# Patient Record
Sex: FEMALE | Race: WHITE | Employment: FULL TIME | URBAN - METROPOLITAN AREA
[De-identification: names, ages, dates, MRNs, and addresses within clinical notes are randomized per-mention and may not be internally consistent; named-entity substitution may affect disease eponyms.]

---

## 2017-01-10 RX ORDER — BENZONATATE 200 MG/1
CAPSULE ORAL
Qty: 30 CAPSULE | Refills: 0 | Status: SHIPPED | OUTPATIENT
Start: 2017-01-10 | End: 2017-01-22 | Stop reason: SDUPTHER

## 2017-01-23 RX ORDER — BENZONATATE 200 MG/1
CAPSULE ORAL
Qty: 30 CAPSULE | Refills: 0 | Status: SHIPPED | OUTPATIENT
Start: 2017-01-23 | End: 2017-05-15 | Stop reason: SDUPTHER

## 2017-01-27 ENCOUNTER — OFFICE VISIT (OUTPATIENT)
Dept: PRIMARY CARE CLINIC | Age: 52
End: 2017-01-27

## 2017-01-27 VITALS
SYSTOLIC BLOOD PRESSURE: 116 MMHG | DIASTOLIC BLOOD PRESSURE: 80 MMHG | RESPIRATION RATE: 12 BRPM | HEART RATE: 87 BPM | WEIGHT: 190 LBS | BODY MASS INDEX: 32.44 KG/M2 | TEMPERATURE: 96.2 F | OXYGEN SATURATION: 95 % | HEIGHT: 64 IN

## 2017-01-27 DIAGNOSIS — R53.83 OTHER MALAISE AND FATIGUE: ICD-10-CM

## 2017-01-27 DIAGNOSIS — M54.41 LOW BACK PAIN WITH RADIATION, RIGHT: ICD-10-CM

## 2017-01-27 DIAGNOSIS — R53.81 OTHER MALAISE AND FATIGUE: ICD-10-CM

## 2017-01-27 DIAGNOSIS — E78.2 MIXED HYPERLIPIDEMIA: ICD-10-CM

## 2017-01-27 DIAGNOSIS — Z23 NEEDS FLU SHOT: ICD-10-CM

## 2017-01-27 DIAGNOSIS — E78.5 DM TYPE 2 WITH DIABETIC DYSLIPIDEMIA (HCC): ICD-10-CM

## 2017-01-27 DIAGNOSIS — E11.69 DM TYPE 2 WITH DIABETIC DYSLIPIDEMIA (HCC): ICD-10-CM

## 2017-01-27 DIAGNOSIS — E78.2 MIXED HYPERLIPIDEMIA: Primary | ICD-10-CM

## 2017-01-27 LAB
ALBUMIN SERPL-MCNC: 4.8 G/DL (ref 3.9–4.9)
ALP BLD-CCNC: 85 U/L (ref 40–130)
ALT SERPL-CCNC: 21 U/L (ref 0–33)
ANION GAP SERPL CALCULATED.3IONS-SCNC: 17 MEQ/L (ref 7–13)
ANISOCYTOSIS: ABNORMAL
AST SERPL-CCNC: 16 U/L (ref 0–35)
ATYPICAL LYMPHOCYTE RELATIVE PERCENT: 7 %
BANDED NEUTROPHILS RELATIVE PERCENT: 4 % (ref 5–11)
BASOPHILS ABSOLUTE: 0.1 K/UL (ref 0–0.2)
BASOPHILS RELATIVE PERCENT: 1 %
BILIRUB SERPL-MCNC: 0.3 MG/DL (ref 0–1.2)
BUN BLDV-MCNC: 22 MG/DL (ref 6–20)
CALCIUM SERPL-MCNC: 9.9 MG/DL (ref 8.6–10.2)
CHLORIDE BLD-SCNC: 99 MEQ/L (ref 98–107)
CHOLESTEROL, TOTAL: 278 MG/DL (ref 0–199)
CO2: 21 MEQ/L (ref 22–29)
CREAT SERPL-MCNC: 0.51 MG/DL (ref 0.5–0.9)
EOSINOPHILS ABSOLUTE: 0.5 K/UL (ref 0–0.7)
EOSINOPHILS RELATIVE PERCENT: 5 %
GFR AFRICAN AMERICAN: >60
GFR NON-AFRICAN AMERICAN: >60
GLOBULIN: 2.9 G/DL (ref 2.3–3.5)
GLUCOSE BLD-MCNC: 137 MG/DL (ref 74–109)
HBA1C MFR BLD: 7.7 % (ref 4.8–5.9)
HCT VFR BLD CALC: 50.5 % (ref 37–47)
HDLC SERPL-MCNC: 35 MG/DL (ref 40–59)
HEMOGLOBIN: 16.1 G/DL (ref 12–16)
LDL CHOLESTEROL CALCULATED: 184 MG/DL (ref 0–129)
LYMPHOCYTES ABSOLUTE: 2.6 K/UL (ref 1–4.8)
LYMPHOCYTES RELATIVE PERCENT: 21 %
MCH RBC QN AUTO: 27.8 PG (ref 27–31.3)
MCHC RBC AUTO-ENTMCNC: 31.8 % (ref 33–37)
MCV RBC AUTO: 87.6 FL (ref 82–100)
MONOCYTES ABSOLUTE: 0.5 K/UL (ref 0.2–0.8)
MONOCYTES RELATIVE PERCENT: 5 %
NEUTROPHILS ABSOLUTE: 5.6 K/UL (ref 1.4–6.5)
NEUTROPHILS RELATIVE PERCENT: 57 %
PDW BLD-RTO: 14.5 % (ref 11.5–14.5)
PLATELET # BLD: 257 K/UL (ref 130–400)
PLATELET SLIDE REVIEW: ADEQUATE
POTASSIUM SERPL-SCNC: 4.6 MEQ/L (ref 3.5–5.1)
RBC # BLD: 5.77 M/UL (ref 4.2–5.4)
SMUDGE CELLS: 6.9
SODIUM BLD-SCNC: 137 MEQ/L (ref 132–144)
TOTAL PROTEIN: 7.7 G/DL (ref 6.4–8.1)
TRIGL SERPL-MCNC: 293 MG/DL (ref 0–200)
WBC # BLD: 9.2 K/UL (ref 4.8–10.8)

## 2017-01-27 PROCEDURE — 4004F PT TOBACCO SCREEN RCVD TLK: CPT | Performed by: FAMILY MEDICINE

## 2017-01-27 PROCEDURE — G8419 CALC BMI OUT NRM PARAM NOF/U: HCPCS | Performed by: FAMILY MEDICINE

## 2017-01-27 PROCEDURE — G8427 DOCREV CUR MEDS BY ELIG CLIN: HCPCS | Performed by: FAMILY MEDICINE

## 2017-01-27 PROCEDURE — 90658 IIV3 VACCINE SPLT 0.5 ML IM: CPT | Performed by: FAMILY MEDICINE

## 2017-01-27 PROCEDURE — 3017F COLORECTAL CA SCREEN DOC REV: CPT | Performed by: FAMILY MEDICINE

## 2017-01-27 PROCEDURE — 3014F SCREEN MAMMO DOC REV: CPT | Performed by: FAMILY MEDICINE

## 2017-01-27 PROCEDURE — 90471 IMMUNIZATION ADMIN: CPT | Performed by: FAMILY MEDICINE

## 2017-01-27 PROCEDURE — G8484 FLU IMMUNIZE NO ADMIN: HCPCS | Performed by: FAMILY MEDICINE

## 2017-01-27 PROCEDURE — 3045F PR MOST RECENT HEMOGLOBIN A1C LEVEL 7.0-9.0%: CPT | Performed by: FAMILY MEDICINE

## 2017-01-27 PROCEDURE — 99214 OFFICE O/P EST MOD 30 MIN: CPT | Performed by: FAMILY MEDICINE

## 2017-01-27 RX ORDER — OXYCODONE HYDROCHLORIDE AND ACETAMINOPHEN 5; 325 MG/1; MG/1
1 TABLET ORAL DAILY PRN
Qty: 30 TABLET | Refills: 0 | Status: SHIPPED | OUTPATIENT
Start: 2017-01-27 | End: 2017-02-14 | Stop reason: SDUPTHER

## 2017-01-27 ASSESSMENT — ENCOUNTER SYMPTOMS
BACK PAIN: 1
BLURRED VISION: 0

## 2017-02-12 ASSESSMENT — ENCOUNTER SYMPTOMS
APNEA: 0
SHORTNESS OF BREATH: 0
ABDOMINAL DISTENTION: 0
ABDOMINAL PAIN: 0

## 2017-02-14 ENCOUNTER — OFFICE VISIT (OUTPATIENT)
Dept: PRIMARY CARE CLINIC | Age: 52
End: 2017-02-14

## 2017-02-14 VITALS
SYSTOLIC BLOOD PRESSURE: 126 MMHG | WEIGHT: 190.6 LBS | TEMPERATURE: 97.7 F | DIASTOLIC BLOOD PRESSURE: 80 MMHG | BODY MASS INDEX: 32.54 KG/M2 | RESPIRATION RATE: 14 BRPM | HEIGHT: 64 IN | HEART RATE: 76 BPM

## 2017-02-14 DIAGNOSIS — Z23 NEED FOR 23-POLYVALENT PNEUMOCOCCAL POLYSACCHARIDE VACCINE: ICD-10-CM

## 2017-02-14 DIAGNOSIS — D72.9 ABNORMAL WHITE BLOOD CELL (WBC): ICD-10-CM

## 2017-02-14 DIAGNOSIS — Z11.4 SCREENING FOR HIV (HUMAN IMMUNODEFICIENCY VIRUS): ICD-10-CM

## 2017-02-14 DIAGNOSIS — M54.41 LOW BACK PAIN WITH RADIATION, RIGHT: Primary | ICD-10-CM

## 2017-02-14 DIAGNOSIS — Z11.59 NEED FOR HEPATITIS C SCREENING TEST: ICD-10-CM

## 2017-02-14 DIAGNOSIS — E78.2 MIXED HYPERLIPIDEMIA: ICD-10-CM

## 2017-02-14 PROCEDURE — G8484 FLU IMMUNIZE NO ADMIN: HCPCS | Performed by: INTERNAL MEDICINE

## 2017-02-14 PROCEDURE — 3017F COLORECTAL CA SCREEN DOC REV: CPT | Performed by: INTERNAL MEDICINE

## 2017-02-14 PROCEDURE — 99213 OFFICE O/P EST LOW 20 MIN: CPT | Performed by: INTERNAL MEDICINE

## 2017-02-14 PROCEDURE — G8428 CUR MEDS NOT DOCUMENT: HCPCS | Performed by: INTERNAL MEDICINE

## 2017-02-14 PROCEDURE — G8419 CALC BMI OUT NRM PARAM NOF/U: HCPCS | Performed by: INTERNAL MEDICINE

## 2017-02-14 PROCEDURE — 3014F SCREEN MAMMO DOC REV: CPT | Performed by: INTERNAL MEDICINE

## 2017-02-14 PROCEDURE — 4004F PT TOBACCO SCREEN RCVD TLK: CPT | Performed by: INTERNAL MEDICINE

## 2017-02-14 PROCEDURE — 90732 PPSV23 VACC 2 YRS+ SUBQ/IM: CPT | Performed by: INTERNAL MEDICINE

## 2017-02-14 PROCEDURE — 90471 IMMUNIZATION ADMIN: CPT | Performed by: INTERNAL MEDICINE

## 2017-02-14 RX ORDER — OXYCODONE HYDROCHLORIDE AND ACETAMINOPHEN 5; 325 MG/1; MG/1
1 TABLET ORAL 2 TIMES DAILY PRN
Qty: 60 TABLET | Refills: 0 | Status: SHIPPED | OUTPATIENT
Start: 2017-02-14 | End: 2017-05-13 | Stop reason: ALTCHOICE

## 2017-02-14 RX ORDER — ATORVASTATIN CALCIUM 10 MG/1
10 TABLET, FILM COATED ORAL DAILY
Qty: 30 TABLET | Refills: 5 | Status: SHIPPED | OUTPATIENT
Start: 2017-02-14 | End: 2017-06-26 | Stop reason: SDUPTHER

## 2017-02-14 ASSESSMENT — ENCOUNTER SYMPTOMS
CHOKING: 0
ABDOMINAL DISTENTION: 0
BACK PAIN: 1
APNEA: 0
BLOOD IN STOOL: 0
FACIAL SWELLING: 0
ABDOMINAL PAIN: 0
PHOTOPHOBIA: 0

## 2017-02-21 RX ORDER — SITAGLIPTIN 100 MG/1
TABLET, FILM COATED ORAL
Qty: 30 TABLET | Refills: 0 | Status: SHIPPED | OUTPATIENT
Start: 2017-02-21 | End: 2017-03-23 | Stop reason: SDUPTHER

## 2017-02-27 RX ORDER — GLIMEPIRIDE 2 MG/1
TABLET ORAL
Qty: 60 TABLET | Refills: 3 | Status: SHIPPED | OUTPATIENT
Start: 2017-02-27 | End: 2017-05-13 | Stop reason: SDUPTHER

## 2017-03-07 PROBLEM — M51.26 HNP (HERNIATED NUCLEUS PULPOSUS), LUMBAR: Status: ACTIVE | Noted: 2017-03-07

## 2017-03-21 ENCOUNTER — HOSPITAL ENCOUNTER (OUTPATIENT)
Dept: PREADMISSION TESTING | Age: 52
Discharge: HOME | End: 2017-03-21
Payer: COMMERCIAL

## 2017-03-21 VITALS
WEIGHT: 190.48 LBS | HEIGHT: 64 IN | TEMPERATURE: 97.7 F | BODY MASS INDEX: 32.52 KG/M2 | HEART RATE: 79 BPM | DIASTOLIC BLOOD PRESSURE: 83 MMHG | SYSTOLIC BLOOD PRESSURE: 153 MMHG | RESPIRATION RATE: 16 BRPM | OXYGEN SATURATION: 97 %

## 2017-03-21 DIAGNOSIS — E66.09 NON MORBID OBESITY DUE TO EXCESS CALORIES: ICD-10-CM

## 2017-03-21 DIAGNOSIS — M51.26 HNP (HERNIATED NUCLEUS PULPOSUS), LUMBAR: ICD-10-CM

## 2017-03-21 PROBLEM — E11.9 DIABETES (HCC): Chronic | Status: ACTIVE | Noted: 2017-03-21

## 2017-03-21 LAB
ABO/RH: NORMAL
ANION GAP SERPL CALCULATED.3IONS-SCNC: 14 MEQ/L (ref 7–13)
ANTIBODY SCREEN: NORMAL
BILIRUBIN URINE: NEGATIVE
BLOOD, URINE: NEGATIVE
BUN BLDV-MCNC: 19 MG/DL (ref 6–20)
CALCIUM SERPL-MCNC: 9.3 MG/DL (ref 8.6–10.2)
CHLORIDE BLD-SCNC: 101 MEQ/L (ref 98–107)
CLARITY: CLEAR
CO2: 22 MEQ/L (ref 22–29)
COLOR: YELLOW
CREAT SERPL-MCNC: 0.62 MG/DL (ref 0.5–0.9)
GFR AFRICAN AMERICAN: >60
GFR NON-AFRICAN AMERICAN: >60
GLUCOSE BLD-MCNC: 132 MG/DL (ref 74–109)
GLUCOSE URINE: 250 MG/DL
HCT VFR BLD CALC: 44.4 % (ref 37–47)
HEMOGLOBIN: 15 G/DL (ref 12–16)
INR BLD: 0.9
KETONES, URINE: NEGATIVE MG/DL
LEUKOCYTE ESTERASE, URINE: NEGATIVE
MCH RBC QN AUTO: 29.8 PG (ref 27–31.3)
MCHC RBC AUTO-ENTMCNC: 33.8 % (ref 33–37)
MCV RBC AUTO: 87.9 FL (ref 82–100)
NITRITE, URINE: NEGATIVE
PDW BLD-RTO: 14.4 % (ref 11.5–14.5)
PH UA: 6 (ref 5–9)
PLATELET # BLD: 230 K/UL (ref 130–400)
POTASSIUM SERPL-SCNC: 4.4 MEQ/L (ref 3.5–5.1)
PROTEIN UA: NEGATIVE MG/DL
PROTHROMBIN TIME: 9.9 SEC (ref 8.1–13.7)
RBC # BLD: 5.05 M/UL (ref 4.2–5.4)
SODIUM BLD-SCNC: 137 MEQ/L (ref 132–144)
SPECIFIC GRAVITY UA: 1.02 (ref 1–1.03)
UROBILINOGEN, URINE: 1 E.U./DL
WBC # BLD: 9.1 K/UL (ref 4.8–10.8)

## 2017-03-21 PROCEDURE — 80048 BASIC METABOLIC PNL TOTAL CA: CPT

## 2017-03-21 PROCEDURE — 85610 PROTHROMBIN TIME: CPT

## 2017-03-21 PROCEDURE — 93005 ELECTROCARDIOGRAM TRACING: CPT

## 2017-03-21 PROCEDURE — 85027 COMPLETE CBC AUTOMATED: CPT

## 2017-03-21 PROCEDURE — 81003 URINALYSIS AUTO W/O SCOPE: CPT

## 2017-03-21 PROCEDURE — 86850 RBC ANTIBODY SCREEN: CPT

## 2017-03-21 PROCEDURE — 86901 BLOOD TYPING SEROLOGIC RH(D): CPT

## 2017-03-21 PROCEDURE — 86900 BLOOD TYPING SEROLOGIC ABO: CPT

## 2017-03-21 RX ORDER — LIDOCAINE HYDROCHLORIDE 10 MG/ML
1 INJECTION, SOLUTION EPIDURAL; INFILTRATION; INTRACAUDAL; PERINEURAL
Status: CANCELLED | OUTPATIENT
Start: 2017-03-21 | End: 2017-03-21

## 2017-03-21 RX ORDER — SODIUM CHLORIDE, SODIUM LACTATE, POTASSIUM CHLORIDE, CALCIUM CHLORIDE 600; 310; 30; 20 MG/100ML; MG/100ML; MG/100ML; MG/100ML
INJECTION, SOLUTION INTRAVENOUS CONTINUOUS
Status: CANCELLED | OUTPATIENT
Start: 2017-03-21

## 2017-03-21 RX ORDER — SODIUM CHLORIDE 0.9 % (FLUSH) 0.9 %
10 SYRINGE (ML) INJECTION EVERY 12 HOURS SCHEDULED
Status: CANCELLED | OUTPATIENT
Start: 2017-03-21

## 2017-03-21 RX ORDER — SODIUM CHLORIDE, SODIUM LACTATE, POTASSIUM CHLORIDE, CALCIUM CHLORIDE 600; 310; 30; 20 MG/100ML; MG/100ML; MG/100ML; MG/100ML
INJECTION, SOLUTION INTRAVENOUS CONTINUOUS
Status: CANCELLED | OUTPATIENT
Start: 2017-03-27

## 2017-03-21 RX ORDER — SODIUM CHLORIDE 0.9 % (FLUSH) 0.9 %
10 SYRINGE (ML) INJECTION PRN
Status: CANCELLED | OUTPATIENT
Start: 2017-03-21

## 2017-03-21 ASSESSMENT — ENCOUNTER SYMPTOMS
COUGH: 0
VOMITING: 0
WHEEZING: 0
STRIDOR: 0
NAUSEA: 0
SHORTNESS OF BREATH: 0
CONSTIPATION: 0
ABDOMINAL PAIN: 0
BACK PAIN: 1
SORE THROAT: 0
EYES NEGATIVE: 1
DIARRHEA: 0
HEARTBURN: 0

## 2017-03-23 LAB
EKG ATRIAL RATE: 74 BPM
EKG P AXIS: 32 DEGREES
EKG P-R INTERVAL: 136 MS
EKG Q-T INTERVAL: 412 MS
EKG QRS DURATION: 82 MS
EKG QTC CALCULATION (BAZETT): 457 MS
EKG R AXIS: 107 DEGREES
EKG T AXIS: 67 DEGREES
EKG VENTRICULAR RATE: 74 BPM

## 2017-03-23 RX ORDER — SITAGLIPTIN 100 MG/1
TABLET, FILM COATED ORAL
Qty: 30 TABLET | Refills: 3 | Status: SHIPPED | OUTPATIENT
Start: 2017-03-23 | End: 2017-05-13 | Stop reason: SDUPTHER

## 2017-03-24 ENCOUNTER — ANESTHESIA EVENT (OUTPATIENT)
Dept: OPERATING ROOM | Age: 52
End: 2017-03-24
Payer: COMMERCIAL

## 2017-03-27 ENCOUNTER — HOSPITAL ENCOUNTER (OUTPATIENT)
Age: 52
Discharge: HOME | End: 2017-03-28
Attending: NEUROLOGICAL SURGERY | Admitting: NEUROLOGICAL SURGERY
Payer: COMMERCIAL

## 2017-03-27 ENCOUNTER — HOSPITAL ENCOUNTER (OUTPATIENT)
Dept: GENERAL RADIOLOGY | Age: 52
Setting detail: OUTPATIENT SURGERY
Discharge: HOME | End: 2017-03-27
Attending: NEUROLOGICAL SURGERY
Payer: COMMERCIAL

## 2017-03-27 ENCOUNTER — ANESTHESIA (OUTPATIENT)
Dept: OPERATING ROOM | Age: 52
End: 2017-03-27
Payer: COMMERCIAL

## 2017-03-27 VITALS
RESPIRATION RATE: 11 BRPM | SYSTOLIC BLOOD PRESSURE: 124 MMHG | OXYGEN SATURATION: 99 % | DIASTOLIC BLOOD PRESSURE: 61 MMHG | TEMPERATURE: 96.8 F

## 2017-03-27 DIAGNOSIS — Z98.890 S/P DISKECTOMY: ICD-10-CM

## 2017-03-27 LAB
GLUCOSE BLD-MCNC: 114 MG/DL (ref 60–115)
GLUCOSE BLD-MCNC: 172 MG/DL (ref 60–115)
PERFORMED ON: ABNORMAL
PERFORMED ON: NORMAL

## 2017-03-27 PROCEDURE — 7100000001 HC PACU RECOVERY - ADDTL 15 MIN: Performed by: NEUROLOGICAL SURGERY

## 2017-03-27 PROCEDURE — 6370000000 HC RX 637 (ALT 250 FOR IP): Performed by: NEUROLOGICAL SURGERY

## 2017-03-27 PROCEDURE — 3700000001 HC ADD 15 MINUTES (ANESTHESIA): Performed by: NEUROLOGICAL SURGERY

## 2017-03-27 PROCEDURE — 6360000002 HC RX W HCPCS: Performed by: NEUROLOGICAL SURGERY

## 2017-03-27 PROCEDURE — 2720000010 HC SURG SUPPLY STERILE: Performed by: NEUROLOGICAL SURGERY

## 2017-03-27 PROCEDURE — 3209999900 FLUORO FOR SURGICAL PROCEDURES

## 2017-03-27 PROCEDURE — 6370000000 HC RX 637 (ALT 250 FOR IP): Performed by: FAMILY MEDICINE

## 2017-03-27 PROCEDURE — 7100000000 HC PACU RECOVERY - FIRST 15 MIN: Performed by: NEUROLOGICAL SURGERY

## 2017-03-27 PROCEDURE — 6360000002 HC RX W HCPCS: Performed by: NURSE ANESTHETIST, CERTIFIED REGISTERED

## 2017-03-27 PROCEDURE — 2580000003 HC RX 258: Performed by: STUDENT IN AN ORGANIZED HEALTH CARE EDUCATION/TRAINING PROGRAM

## 2017-03-27 PROCEDURE — G0378 HOSPITAL OBSERVATION PER HR: HCPCS

## 2017-03-27 PROCEDURE — 2500000003 HC RX 250 WO HCPCS: Performed by: NEUROLOGICAL SURGERY

## 2017-03-27 PROCEDURE — 2580000003 HC RX 258: Performed by: NURSE ANESTHETIST, CERTIFIED REGISTERED

## 2017-03-27 PROCEDURE — 2500000003 HC RX 250 WO HCPCS: Performed by: NURSE ANESTHETIST, CERTIFIED REGISTERED

## 2017-03-27 PROCEDURE — 3600000004 HC SURGERY LEVEL 4 BASE: Performed by: NEUROLOGICAL SURGERY

## 2017-03-27 PROCEDURE — 2500000003 HC RX 250 WO HCPCS: Performed by: STUDENT IN AN ORGANIZED HEALTH CARE EDUCATION/TRAINING PROGRAM

## 2017-03-27 PROCEDURE — 2580000003 HC RX 258: Performed by: NEUROLOGICAL SURGERY

## 2017-03-27 PROCEDURE — 3600000014 HC SURGERY LEVEL 4 ADDTL 15MIN: Performed by: NEUROLOGICAL SURGERY

## 2017-03-27 PROCEDURE — 3700000000 HC ANESTHESIA ATTENDED CARE: Performed by: NEUROLOGICAL SURGERY

## 2017-03-27 PROCEDURE — 6360000002 HC RX W HCPCS

## 2017-03-27 RX ORDER — SUCCINYLCHOLINE CHLORIDE 20 MG/ML
INJECTION INTRAMUSCULAR; INTRAVENOUS PRN
Status: DISCONTINUED | OUTPATIENT
Start: 2017-03-27 | End: 2017-03-27 | Stop reason: SDUPTHER

## 2017-03-27 RX ORDER — METOCLOPRAMIDE HYDROCHLORIDE 5 MG/ML
10 INJECTION INTRAMUSCULAR; INTRAVENOUS
Status: DISCONTINUED | OUTPATIENT
Start: 2017-03-27 | End: 2017-03-27 | Stop reason: HOSPADM

## 2017-03-27 RX ORDER — SODIUM CHLORIDE, SODIUM LACTATE, POTASSIUM CHLORIDE, CALCIUM CHLORIDE 600; 310; 30; 20 MG/100ML; MG/100ML; MG/100ML; MG/100ML
INJECTION, SOLUTION INTRAVENOUS CONTINUOUS
Status: DISCONTINUED | OUTPATIENT
Start: 2017-03-27 | End: 2017-03-27

## 2017-03-27 RX ORDER — LIDOCAINE HYDROCHLORIDE 20 MG/ML
INJECTION, SOLUTION INFILTRATION; PERINEURAL PRN
Status: DISCONTINUED | OUTPATIENT
Start: 2017-03-27 | End: 2017-03-27 | Stop reason: SDUPTHER

## 2017-03-27 RX ORDER — ACETAMINOPHEN 325 MG/1
650 TABLET ORAL EVERY 4 HOURS PRN
Status: DISCONTINUED | OUTPATIENT
Start: 2017-03-27 | End: 2017-03-28 | Stop reason: HOSPADM

## 2017-03-27 RX ORDER — SODIUM CHLORIDE 0.9 % (FLUSH) 0.9 %
10 SYRINGE (ML) INJECTION EVERY 12 HOURS SCHEDULED
Status: DISCONTINUED | OUTPATIENT
Start: 2017-03-27 | End: 2017-03-28 | Stop reason: HOSPADM

## 2017-03-27 RX ORDER — TRAZODONE HYDROCHLORIDE 100 MG/1
100 TABLET ORAL DAILY
Status: DISCONTINUED | OUTPATIENT
Start: 2017-03-27 | End: 2017-03-28 | Stop reason: HOSPADM

## 2017-03-27 RX ORDER — MAGNESIUM HYDROXIDE 1200 MG/15ML
LIQUID ORAL CONTINUOUS PRN
Status: DISCONTINUED | OUTPATIENT
Start: 2017-03-27 | End: 2017-03-27 | Stop reason: HOSPADM

## 2017-03-27 RX ORDER — IPRATROPIUM BROMIDE 42 UG/1
2 SPRAY, METERED NASAL 3 TIMES DAILY PRN
Status: DISCONTINUED | OUTPATIENT
Start: 2017-03-27 | End: 2017-03-28 | Stop reason: HOSPADM

## 2017-03-27 RX ORDER — OXYCODONE HYDROCHLORIDE AND ACETAMINOPHEN 5; 325 MG/1; MG/1
1 TABLET ORAL EVERY 4 HOURS PRN
Status: DISCONTINUED | OUTPATIENT
Start: 2017-03-27 | End: 2017-03-28 | Stop reason: HOSPADM

## 2017-03-27 RX ORDER — HYDROCODONE BITARTRATE AND ACETAMINOPHEN 5; 325 MG/1; MG/1
2 TABLET ORAL PRN
Status: DISCONTINUED | OUTPATIENT
Start: 2017-03-27 | End: 2017-03-27 | Stop reason: HOSPADM

## 2017-03-27 RX ORDER — SODIUM CHLORIDE 9 MG/ML
INJECTION, SOLUTION INTRAVENOUS CONTINUOUS
Status: DISCONTINUED | OUTPATIENT
Start: 2017-03-27 | End: 2017-03-28 | Stop reason: HOSPADM

## 2017-03-27 RX ORDER — ATORVASTATIN CALCIUM 10 MG/1
10 TABLET, FILM COATED ORAL DAILY
Status: DISCONTINUED | OUTPATIENT
Start: 2017-03-27 | End: 2017-03-28 | Stop reason: HOSPADM

## 2017-03-27 RX ORDER — FENTANYL CITRATE 50 UG/ML
50 INJECTION, SOLUTION INTRAMUSCULAR; INTRAVENOUS EVERY 10 MIN PRN
Status: DISCONTINUED | OUTPATIENT
Start: 2017-03-27 | End: 2017-03-27 | Stop reason: HOSPADM

## 2017-03-27 RX ORDER — DIPHENHYDRAMINE HYDROCHLORIDE 50 MG/ML
12.5 INJECTION INTRAMUSCULAR; INTRAVENOUS
Status: DISCONTINUED | OUTPATIENT
Start: 2017-03-27 | End: 2017-03-27 | Stop reason: HOSPADM

## 2017-03-27 RX ORDER — CYCLOBENZAPRINE HCL 10 MG
10 TABLET ORAL 2 TIMES DAILY PRN
Status: DISCONTINUED | OUTPATIENT
Start: 2017-03-27 | End: 2017-03-28 | Stop reason: HOSPADM

## 2017-03-27 RX ORDER — MEPERIDINE HYDROCHLORIDE 25 MG/ML
12.5 INJECTION INTRAMUSCULAR; INTRAVENOUS; SUBCUTANEOUS EVERY 5 MIN PRN
Status: DISCONTINUED | OUTPATIENT
Start: 2017-03-27 | End: 2017-03-27 | Stop reason: HOSPADM

## 2017-03-27 RX ORDER — HYDROCODONE BITARTRATE AND ACETAMINOPHEN 5; 325 MG/1; MG/1
1 TABLET ORAL PRN
Status: DISCONTINUED | OUTPATIENT
Start: 2017-03-27 | End: 2017-03-27 | Stop reason: HOSPADM

## 2017-03-27 RX ORDER — ONDANSETRON 2 MG/ML
4 INJECTION INTRAMUSCULAR; INTRAVENOUS EVERY 6 HOURS PRN
Status: DISCONTINUED | OUTPATIENT
Start: 2017-03-27 | End: 2017-03-28 | Stop reason: HOSPADM

## 2017-03-27 RX ORDER — SODIUM CHLORIDE 0.9 % (FLUSH) 0.9 %
10 SYRINGE (ML) INJECTION PRN
Status: DISCONTINUED | OUTPATIENT
Start: 2017-03-27 | End: 2017-03-28 | Stop reason: HOSPADM

## 2017-03-27 RX ORDER — PROPOFOL 10 MG/ML
INJECTION, EMULSION INTRAVENOUS PRN
Status: DISCONTINUED | OUTPATIENT
Start: 2017-03-27 | End: 2017-03-27 | Stop reason: SDUPTHER

## 2017-03-27 RX ORDER — OXYCODONE HYDROCHLORIDE AND ACETAMINOPHEN 5; 325 MG/1; MG/1
2 TABLET ORAL EVERY 4 HOURS PRN
Status: DISCONTINUED | OUTPATIENT
Start: 2017-03-27 | End: 2017-03-28 | Stop reason: HOSPADM

## 2017-03-27 RX ORDER — ONDANSETRON 2 MG/ML
4 INJECTION INTRAMUSCULAR; INTRAVENOUS
Status: DISCONTINUED | OUTPATIENT
Start: 2017-03-27 | End: 2017-03-27 | Stop reason: HOSPADM

## 2017-03-27 RX ORDER — MIDAZOLAM HYDROCHLORIDE 1 MG/ML
INJECTION INTRAMUSCULAR; INTRAVENOUS PRN
Status: DISCONTINUED | OUTPATIENT
Start: 2017-03-27 | End: 2017-03-27 | Stop reason: SDUPTHER

## 2017-03-27 RX ORDER — FENTANYL CITRATE 50 UG/ML
INJECTION, SOLUTION INTRAMUSCULAR; INTRAVENOUS PRN
Status: DISCONTINUED | OUTPATIENT
Start: 2017-03-27 | End: 2017-03-27 | Stop reason: SDUPTHER

## 2017-03-27 RX ORDER — LIDOCAINE HYDROCHLORIDE 10 MG/ML
1 INJECTION, SOLUTION EPIDURAL; INFILTRATION; INTRACAUDAL; PERINEURAL
Status: COMPLETED | OUTPATIENT
Start: 2017-03-27 | End: 2017-03-27

## 2017-03-27 RX ORDER — ONDANSETRON 2 MG/ML
INJECTION INTRAMUSCULAR; INTRAVENOUS PRN
Status: DISCONTINUED | OUTPATIENT
Start: 2017-03-27 | End: 2017-03-27 | Stop reason: SDUPTHER

## 2017-03-27 RX ORDER — GLIMEPIRIDE 2 MG/1
2 TABLET ORAL 2 TIMES DAILY
Status: DISCONTINUED | OUTPATIENT
Start: 2017-03-27 | End: 2017-03-27

## 2017-03-27 RX ORDER — VANCOMYCIN HYDROCHLORIDE 1 G/200ML
INJECTION, SOLUTION INTRAVENOUS CONTINUOUS PRN
Status: DISCONTINUED | OUTPATIENT
Start: 2017-03-27 | End: 2017-03-27 | Stop reason: HOSPADM

## 2017-03-27 RX ORDER — BENZONATATE 100 MG/1
100 CAPSULE ORAL EVERY 4 HOURS PRN
Status: DISCONTINUED | OUTPATIENT
Start: 2017-03-27 | End: 2017-03-28 | Stop reason: HOSPADM

## 2017-03-27 RX ORDER — ROCURONIUM BROMIDE 10 MG/ML
INJECTION, SOLUTION INTRAVENOUS PRN
Status: DISCONTINUED | OUTPATIENT
Start: 2017-03-27 | End: 2017-03-27 | Stop reason: SDUPTHER

## 2017-03-27 RX ORDER — SODIUM CHLORIDE, SODIUM LACTATE, POTASSIUM CHLORIDE, CALCIUM CHLORIDE 600; 310; 30; 20 MG/100ML; MG/100ML; MG/100ML; MG/100ML
INJECTION, SOLUTION INTRAVENOUS CONTINUOUS
Status: DISCONTINUED | OUTPATIENT
Start: 2017-03-27 | End: 2017-03-28 | Stop reason: HOSPADM

## 2017-03-27 RX ORDER — DOCUSATE SODIUM 100 MG/1
100 CAPSULE, LIQUID FILLED ORAL 2 TIMES DAILY
Status: DISCONTINUED | OUTPATIENT
Start: 2017-03-27 | End: 2017-03-28 | Stop reason: HOSPADM

## 2017-03-27 RX ORDER — GLIMEPIRIDE 2 MG/1
2 TABLET ORAL 2 TIMES DAILY PRN
Status: DISCONTINUED | OUTPATIENT
Start: 2017-03-27 | End: 2017-03-28 | Stop reason: HOSPADM

## 2017-03-27 RX ADMIN — PHENYLEPHRINE HYDROCHLORIDE 100 MCG: 10 INJECTION INTRAVENOUS at 16:10

## 2017-03-27 RX ADMIN — PHENYLEPHRINE HYDROCHLORIDE 100 MCG: 10 INJECTION INTRAVENOUS at 16:13

## 2017-03-27 RX ADMIN — Medication 2 G: at 15:47

## 2017-03-27 RX ADMIN — SUCCINYLCHOLINE CHLORIDE 120 MG: 20 INJECTION, SOLUTION INTRAMUSCULAR; INTRAVENOUS at 15:54

## 2017-03-27 RX ADMIN — PHENYLEPHRINE HYDROCHLORIDE 100 MCG: 10 INJECTION INTRAVENOUS at 16:11

## 2017-03-27 RX ADMIN — DOCUSATE SODIUM 100 MG: 100 CAPSULE, LIQUID FILLED ORAL at 21:40

## 2017-03-27 RX ADMIN — SUGAMMADEX 200 MG: 100 INJECTION, SOLUTION INTRAVENOUS at 17:18

## 2017-03-27 RX ADMIN — LIDOCAINE HYDROCHLORIDE 80 MG: 20 INJECTION, SOLUTION INFILTRATION; PERINEURAL at 15:53

## 2017-03-27 RX ADMIN — SODIUM CHLORIDE, POTASSIUM CHLORIDE, SODIUM LACTATE AND CALCIUM CHLORIDE: 600; 310; 30; 20 INJECTION, SOLUTION INTRAVENOUS at 12:54

## 2017-03-27 RX ADMIN — OXYCODONE HYDROCHLORIDE AND ACETAMINOPHEN 1 TABLET: 5; 325 TABLET ORAL at 21:40

## 2017-03-27 RX ADMIN — FENTANYL CITRATE 50 MCG: 50 INJECTION, SOLUTION INTRAMUSCULAR; INTRAVENOUS at 17:18

## 2017-03-27 RX ADMIN — ROCURONIUM BROMIDE 40 MG: 10 SOLUTION INTRAVENOUS at 16:01

## 2017-03-27 RX ADMIN — OXYCODONE HYDROCHLORIDE AND ACETAMINOPHEN 1 TABLET: 5; 325 TABLET ORAL at 19:43

## 2017-03-27 RX ADMIN — TRAZODONE HYDROCHLORIDE 100 MG: 100 TABLET ORAL at 21:43

## 2017-03-27 RX ADMIN — LINAGLIPTIN 5 MG: 5 TABLET, FILM COATED ORAL at 21:40

## 2017-03-27 RX ADMIN — SODIUM CHLORIDE, POTASSIUM CHLORIDE, SODIUM LACTATE AND CALCIUM CHLORIDE: 600; 310; 30; 20 INJECTION, SOLUTION INTRAVENOUS at 16:35

## 2017-03-27 RX ADMIN — ROCURONIUM BROMIDE 10 MG: 10 SOLUTION INTRAVENOUS at 15:53

## 2017-03-27 RX ADMIN — PROPOFOL 150 MG: 10 INJECTION, EMULSION INTRAVENOUS at 15:53

## 2017-03-27 RX ADMIN — ONDANSETRON 4 MG: 2 INJECTION, SOLUTION INTRAMUSCULAR; INTRAVENOUS at 17:15

## 2017-03-27 RX ADMIN — LIDOCAINE HYDROCHLORIDE 0.1 ML: 10 INJECTION, SOLUTION EPIDURAL; INFILTRATION; INTRACAUDAL; PERINEURAL at 12:54

## 2017-03-27 RX ADMIN — PHENYLEPHRINE HYDROCHLORIDE 30 MCG/MIN: 10 INJECTION INTRAVENOUS at 16:13

## 2017-03-27 RX ADMIN — PHENYLEPHRINE HYDROCHLORIDE 100 MCG: 10 INJECTION INTRAVENOUS at 16:06

## 2017-03-27 RX ADMIN — SODIUM CHLORIDE: 9 INJECTION, SOLUTION INTRAVENOUS at 19:43

## 2017-03-27 RX ADMIN — ATORVASTATIN CALCIUM 10 MG: 10 TABLET, FILM COATED ORAL at 21:40

## 2017-03-27 RX ADMIN — FENTANYL CITRATE 50 MCG: 50 INJECTION, SOLUTION INTRAMUSCULAR; INTRAVENOUS at 17:22

## 2017-03-27 RX ADMIN — PHENYLEPHRINE HYDROCHLORIDE 100 MCG: 10 INJECTION INTRAVENOUS at 16:05

## 2017-03-27 RX ADMIN — FENTANYL CITRATE 100 MCG: 50 INJECTION, SOLUTION INTRAMUSCULAR; INTRAVENOUS at 15:52

## 2017-03-27 RX ADMIN — MIDAZOLAM HYDROCHLORIDE 2 MG: 1 INJECTION, SOLUTION INTRAMUSCULAR; INTRAVENOUS at 15:47

## 2017-03-27 ASSESSMENT — PAIN DESCRIPTION - DESCRIPTORS
DESCRIPTORS: ACHING
DESCRIPTORS: ACHING

## 2017-03-27 ASSESSMENT — PAIN SCALES - GENERAL
PAINLEVEL_OUTOF10: 8
PAINLEVEL_OUTOF10: 8
PAINLEVEL_OUTOF10: 0
PAINLEVEL_OUTOF10: 6
PAINLEVEL_OUTOF10: 0
PAINLEVEL_OUTOF10: 0

## 2017-03-27 ASSESSMENT — PAIN DESCRIPTION - PAIN TYPE: TYPE: SURGICAL PAIN

## 2017-03-27 ASSESSMENT — PAIN - FUNCTIONAL ASSESSMENT: PAIN_FUNCTIONAL_ASSESSMENT: 0-10

## 2017-03-27 ASSESSMENT — PAIN DESCRIPTION - FREQUENCY: FREQUENCY: INTERMITTENT

## 2017-03-27 ASSESSMENT — PAIN DESCRIPTION - LOCATION: LOCATION: BACK

## 2017-03-28 VITALS
SYSTOLIC BLOOD PRESSURE: 113 MMHG | OXYGEN SATURATION: 99 % | BODY MASS INDEX: 33.66 KG/M2 | HEART RATE: 75 BPM | HEIGHT: 63 IN | RESPIRATION RATE: 16 BRPM | DIASTOLIC BLOOD PRESSURE: 68 MMHG | WEIGHT: 190 LBS | TEMPERATURE: 98.5 F

## 2017-03-28 LAB
GLUCOSE BLD-MCNC: 135 MG/DL (ref 60–115)
PERFORMED ON: ABNORMAL

## 2017-03-28 PROCEDURE — G0378 HOSPITAL OBSERVATION PER HR: HCPCS

## 2017-03-28 PROCEDURE — 96365 THER/PROPH/DIAG IV INF INIT: CPT

## 2017-03-28 PROCEDURE — 6370000000 HC RX 637 (ALT 250 FOR IP): Performed by: NEUROLOGICAL SURGERY

## 2017-03-28 PROCEDURE — 2580000003 HC RX 258: Performed by: NEUROLOGICAL SURGERY

## 2017-03-28 PROCEDURE — 6360000002 HC RX W HCPCS: Performed by: NEUROLOGICAL SURGERY

## 2017-03-28 PROCEDURE — 96366 THER/PROPH/DIAG IV INF ADDON: CPT

## 2017-03-28 RX ADMIN — OXYCODONE HYDROCHLORIDE AND ACETAMINOPHEN 2 TABLET: 5; 325 TABLET ORAL at 02:39

## 2017-03-28 RX ADMIN — DOCUSATE SODIUM 100 MG: 100 CAPSULE, LIQUID FILLED ORAL at 09:06

## 2017-03-28 RX ADMIN — SODIUM CHLORIDE: 9 INJECTION, SOLUTION INTRAVENOUS at 05:53

## 2017-03-28 RX ADMIN — Medication 2 G: at 09:06

## 2017-03-28 RX ADMIN — OXYCODONE HYDROCHLORIDE AND ACETAMINOPHEN 2 TABLET: 5; 325 TABLET ORAL at 09:06

## 2017-03-28 RX ADMIN — Medication 2 G: at 00:17

## 2017-03-28 ASSESSMENT — PAIN SCALES - GENERAL
PAINLEVEL_OUTOF10: 0
PAINLEVEL_OUTOF10: 0
PAINLEVEL_OUTOF10: 6
PAINLEVEL_OUTOF10: 5
PAINLEVEL_OUTOF10: 3
PAINLEVEL_OUTOF10: 3
PAINLEVEL_OUTOF10: 6

## 2017-03-28 ASSESSMENT — PAIN DESCRIPTION - ONSET
ONSET: ON-GOING

## 2017-03-28 ASSESSMENT — PAIN DESCRIPTION - LOCATION
LOCATION: BACK

## 2017-03-28 ASSESSMENT — PAIN DESCRIPTION - PAIN TYPE
TYPE: SURGICAL PAIN

## 2017-03-28 ASSESSMENT — PAIN DESCRIPTION - FREQUENCY
FREQUENCY: CONTINUOUS

## 2017-03-28 ASSESSMENT — PAIN DESCRIPTION - ORIENTATION
ORIENTATION: LOWER
ORIENTATION: LOWER
ORIENTATION: POSTERIOR
ORIENTATION: LOWER

## 2017-03-28 ASSESSMENT — PAIN DESCRIPTION - DESCRIPTORS
DESCRIPTORS: THROBBING

## 2017-04-13 RX ORDER — TRAZODONE HYDROCHLORIDE 150 MG/1
TABLET ORAL
Qty: 30 TABLET | Refills: 0 | Status: SHIPPED | OUTPATIENT
Start: 2017-04-13 | End: 2017-05-15 | Stop reason: SDUPTHER

## 2017-05-13 ENCOUNTER — OFFICE VISIT (OUTPATIENT)
Dept: PRIMARY CARE CLINIC | Age: 52
End: 2017-05-13

## 2017-05-13 VITALS
HEIGHT: 64 IN | WEIGHT: 189 LBS | SYSTOLIC BLOOD PRESSURE: 92 MMHG | DIASTOLIC BLOOD PRESSURE: 62 MMHG | BODY MASS INDEX: 32.27 KG/M2 | HEART RATE: 80 BPM

## 2017-05-13 DIAGNOSIS — E78.00 HYPERCHOLESTEREMIA: ICD-10-CM

## 2017-05-13 DIAGNOSIS — J40 BRONCHITIS: ICD-10-CM

## 2017-05-13 LAB
ALBUMIN SERPL-MCNC: 4.6 G/DL (ref 3.9–4.9)
ALP BLD-CCNC: 112 U/L (ref 40–130)
ALT SERPL-CCNC: 38 U/L (ref 0–33)
ANION GAP SERPL CALCULATED.3IONS-SCNC: 15 MEQ/L (ref 7–13)
AST SERPL-CCNC: 22 U/L (ref 0–35)
BILIRUB SERPL-MCNC: 0.2 MG/DL (ref 0–1.2)
BILIRUBIN DIRECT: 0 MG/DL (ref 0–0.3)
BILIRUBIN, INDIRECT: 0.2 MG/DL (ref 0–0.6)
BUN BLDV-MCNC: 21 MG/DL (ref 6–20)
CALCIUM SERPL-MCNC: 9.8 MG/DL (ref 8.6–10.2)
CHLORIDE BLD-SCNC: 98 MEQ/L (ref 98–107)
CHOLESTEROL, TOTAL: 232 MG/DL (ref 0–199)
CO2: 24 MEQ/L (ref 22–29)
CREAT SERPL-MCNC: 0.57 MG/DL (ref 0.5–0.9)
CREATININE URINE: 47.9 MG/DL
GFR AFRICAN AMERICAN: >60
GFR NON-AFRICAN AMERICAN: >60
GLUCOSE BLD-MCNC: 171 MG/DL (ref 74–109)
GLUCOSE BLD-MCNC: 176 MG/DL
HBA1C MFR BLD: 7.6 %
HDLC SERPL-MCNC: 34 MG/DL (ref 40–59)
LDL CHOLESTEROL CALCULATED: 147 MG/DL (ref 0–129)
MICROALBUMIN UR-MCNC: <1.2 MG/DL
MICROALBUMIN/CREAT UR-RTO: NORMAL MG/G (ref 0–30)
POTASSIUM SERPL-SCNC: 4.3 MEQ/L (ref 3.5–5.1)
SODIUM BLD-SCNC: 137 MEQ/L (ref 132–144)
TOTAL PROTEIN: 7.6 G/DL (ref 6.4–8.1)
TRIGL SERPL-MCNC: 255 MG/DL (ref 0–200)

## 2017-05-13 PROCEDURE — G8427 DOCREV CUR MEDS BY ELIG CLIN: HCPCS | Performed by: INTERNAL MEDICINE

## 2017-05-13 PROCEDURE — 4004F PT TOBACCO SCREEN RCVD TLK: CPT | Performed by: INTERNAL MEDICINE

## 2017-05-13 PROCEDURE — G8417 CALC BMI ABV UP PARAM F/U: HCPCS | Performed by: INTERNAL MEDICINE

## 2017-05-13 PROCEDURE — 3014F SCREEN MAMMO DOC REV: CPT | Performed by: INTERNAL MEDICINE

## 2017-05-13 PROCEDURE — 99214 OFFICE O/P EST MOD 30 MIN: CPT | Performed by: INTERNAL MEDICINE

## 2017-05-13 PROCEDURE — 3045F PR MOST RECENT HEMOGLOBIN A1C LEVEL 7.0-9.0%: CPT | Performed by: INTERNAL MEDICINE

## 2017-05-13 PROCEDURE — 3017F COLORECTAL CA SCREEN DOC REV: CPT | Performed by: INTERNAL MEDICINE

## 2017-05-13 PROCEDURE — 82962 GLUCOSE BLOOD TEST: CPT | Performed by: INTERNAL MEDICINE

## 2017-05-13 PROCEDURE — 83036 HEMOGLOBIN GLYCOSYLATED A1C: CPT | Performed by: INTERNAL MEDICINE

## 2017-05-13 RX ORDER — GLIMEPIRIDE 2 MG/1
TABLET ORAL
Qty: 60 TABLET | Refills: 3 | Status: SHIPPED | OUTPATIENT
Start: 2017-05-13 | End: 2017-10-24 | Stop reason: SDUPTHER

## 2017-05-13 RX ORDER — AZITHROMYCIN 250 MG/1
TABLET, FILM COATED ORAL
Qty: 6 TABLET | Refills: 0 | Status: SHIPPED | OUTPATIENT
Start: 2017-05-13 | End: 2017-06-26

## 2017-05-13 ASSESSMENT — ENCOUNTER SYMPTOMS
COUGH: 1
WHEEZING: 1
BACK PAIN: 1

## 2017-05-15 RX ORDER — TRAZODONE HYDROCHLORIDE 150 MG/1
150 TABLET ORAL NIGHTLY
Qty: 30 TABLET | Refills: 5 | Status: SHIPPED | OUTPATIENT
Start: 2017-05-15 | End: 2017-06-28 | Stop reason: SDUPTHER

## 2017-05-15 RX ORDER — BENZONATATE 200 MG/1
200 CAPSULE ORAL 3 TIMES DAILY PRN
Qty: 30 CAPSULE | Refills: 0 | Status: SHIPPED | OUTPATIENT
Start: 2017-05-15 | End: 2017-10-28 | Stop reason: ALTCHOICE

## 2017-06-13 DIAGNOSIS — M54.41 LOW BACK PAIN WITH RADIATION, RIGHT: ICD-10-CM

## 2017-06-13 RX ORDER — CYCLOBENZAPRINE HCL 10 MG
10 TABLET ORAL 2 TIMES DAILY PRN
Qty: 60 TABLET | Refills: 4 | Status: SHIPPED | OUTPATIENT
Start: 2017-06-13 | End: 2019-04-16

## 2017-06-26 ENCOUNTER — OFFICE VISIT (OUTPATIENT)
Dept: PRIMARY CARE CLINIC | Age: 52
End: 2017-06-26

## 2017-06-26 VITALS
BODY MASS INDEX: 32.68 KG/M2 | SYSTOLIC BLOOD PRESSURE: 120 MMHG | OXYGEN SATURATION: 96 % | WEIGHT: 191.4 LBS | HEART RATE: 101 BPM | RESPIRATION RATE: 16 BRPM | DIASTOLIC BLOOD PRESSURE: 70 MMHG | HEIGHT: 64 IN | TEMPERATURE: 98.6 F

## 2017-06-26 DIAGNOSIS — M54.9 CHRONIC LEFT-SIDED BACK PAIN, UNSPECIFIED BACK LOCATION: ICD-10-CM

## 2017-06-26 DIAGNOSIS — G47.09 OTHER INSOMNIA: ICD-10-CM

## 2017-06-26 DIAGNOSIS — M25.562 CHRONIC PAIN OF BOTH KNEES: ICD-10-CM

## 2017-06-26 DIAGNOSIS — G89.29 CHRONIC PAIN OF BOTH KNEES: ICD-10-CM

## 2017-06-26 DIAGNOSIS — Z11.4 SCREENING FOR HIV (HUMAN IMMUNODEFICIENCY VIRUS): ICD-10-CM

## 2017-06-26 DIAGNOSIS — Z11.59 NEED FOR HEPATITIS C SCREENING TEST: ICD-10-CM

## 2017-06-26 DIAGNOSIS — E78.2 MIXED HYPERLIPIDEMIA: Primary | ICD-10-CM

## 2017-06-26 DIAGNOSIS — G89.29 CHRONIC LEFT-SIDED BACK PAIN, UNSPECIFIED BACK LOCATION: ICD-10-CM

## 2017-06-26 DIAGNOSIS — M25.561 CHRONIC PAIN OF BOTH KNEES: ICD-10-CM

## 2017-06-26 PROCEDURE — 3014F SCREEN MAMMO DOC REV: CPT | Performed by: INTERNAL MEDICINE

## 2017-06-26 PROCEDURE — G8427 DOCREV CUR MEDS BY ELIG CLIN: HCPCS | Performed by: INTERNAL MEDICINE

## 2017-06-26 PROCEDURE — G8417 CALC BMI ABV UP PARAM F/U: HCPCS | Performed by: INTERNAL MEDICINE

## 2017-06-26 PROCEDURE — 4004F PT TOBACCO SCREEN RCVD TLK: CPT | Performed by: INTERNAL MEDICINE

## 2017-06-26 PROCEDURE — 3017F COLORECTAL CA SCREEN DOC REV: CPT | Performed by: INTERNAL MEDICINE

## 2017-06-26 PROCEDURE — 99214 OFFICE O/P EST MOD 30 MIN: CPT | Performed by: INTERNAL MEDICINE

## 2017-06-26 RX ORDER — TRAZODONE HYDROCHLORIDE 100 MG/1
TABLET ORAL
Qty: 30 TABLET | Refills: 5 | Status: SHIPPED | OUTPATIENT
Start: 2017-06-26 | End: 2017-06-28

## 2017-06-26 RX ORDER — ATORVASTATIN CALCIUM 20 MG/1
20 TABLET, FILM COATED ORAL DAILY
Qty: 30 TABLET | Refills: 5 | Status: SHIPPED | OUTPATIENT
Start: 2017-06-26 | End: 2017-10-28 | Stop reason: SDUPTHER

## 2017-06-26 RX ORDER — TRAMADOL HYDROCHLORIDE 50 MG/1
50 TABLET ORAL 2 TIMES DAILY PRN
Qty: 60 TABLET | Refills: 2 | Status: SHIPPED | OUTPATIENT
Start: 2017-06-26 | End: 2019-04-16

## 2017-06-26 RX ORDER — IBUPROFEN 800 MG/1
TABLET ORAL
Refills: 5 | COMMUNITY
Start: 2017-06-22 | End: 2020-04-06 | Stop reason: SDUPTHER

## 2017-06-26 ASSESSMENT — PATIENT HEALTH QUESTIONNAIRE - PHQ9
SUM OF ALL RESPONSES TO PHQ QUESTIONS 1-9: 0
2. FEELING DOWN, DEPRESSED OR HOPELESS: 0
SUM OF ALL RESPONSES TO PHQ9 QUESTIONS 1 & 2: 0
1. LITTLE INTEREST OR PLEASURE IN DOING THINGS: 0

## 2017-06-28 RX ORDER — TRAZODONE HYDROCHLORIDE 150 MG/1
150 TABLET ORAL NIGHTLY
Qty: 30 TABLET | Refills: 5 | Status: SHIPPED | OUTPATIENT
Start: 2017-06-28 | End: 2020-02-22 | Stop reason: SDUPTHER

## 2017-06-29 ASSESSMENT — ENCOUNTER SYMPTOMS
APNEA: 0
PHOTOPHOBIA: 0
BACK PAIN: 1
CHOKING: 0
FACIAL SWELLING: 0
ABDOMINAL PAIN: 0
BLOOD IN STOOL: 0
ABDOMINAL DISTENTION: 0

## 2017-07-21 ENCOUNTER — TELEPHONE (OUTPATIENT)
Dept: PRIMARY CARE CLINIC | Age: 52
End: 2017-07-21

## 2017-07-21 DIAGNOSIS — M79.671 RIGHT FOOT PAIN: Primary | ICD-10-CM

## 2017-07-21 NOTE — TELEPHONE ENCOUNTER
----- Message from Vamshi Garcia sent at 7/21/2017  2:39 PM EDT -----  Regarding: Visit Follow-Up Question  Contact: [phone]  Hello there,  I am in need of a referral to [facility]. I have scheduled an appointment but didn't realize I didn't have a referral.   Would you be able to send me one?  My appointment is on 7/28/17 at 8:50am.    Thanks calista Santo

## 2017-07-28 ENCOUNTER — OFFICE VISIT (OUTPATIENT)
Dept: PODIATRY | Age: 52
End: 2017-07-28

## 2017-07-28 VITALS
BODY MASS INDEX: 32.44 KG/M2 | HEIGHT: 64 IN | TEMPERATURE: 97.7 F | HEART RATE: 72 BPM | SYSTOLIC BLOOD PRESSURE: 126 MMHG | WEIGHT: 190 LBS | RESPIRATION RATE: 16 BRPM | DIASTOLIC BLOOD PRESSURE: 80 MMHG

## 2017-07-28 DIAGNOSIS — D72.9 ABNORMAL WHITE BLOOD CELL (WBC): ICD-10-CM

## 2017-07-28 DIAGNOSIS — Z11.4 SCREENING FOR HIV (HUMAN IMMUNODEFICIENCY VIRUS): ICD-10-CM

## 2017-07-28 DIAGNOSIS — L97.501: ICD-10-CM

## 2017-07-28 DIAGNOSIS — Z11.59 NEED FOR HEPATITIS C SCREENING TEST: ICD-10-CM

## 2017-07-28 DIAGNOSIS — E08.42 DIABETIC POLYNEUROPATHY ASSOCIATED WITH DIABETES MELLITUS DUE TO UNDERLYING CONDITION (HCC): ICD-10-CM

## 2017-07-28 DIAGNOSIS — E78.2 MIXED HYPERLIPIDEMIA: ICD-10-CM

## 2017-07-28 LAB
ALBUMIN SERPL-MCNC: 4.3 G/DL (ref 3.9–4.9)
ALP BLD-CCNC: 94 U/L (ref 40–130)
ALT SERPL-CCNC: 20 U/L (ref 0–33)
ANION GAP SERPL CALCULATED.3IONS-SCNC: 13 MEQ/L (ref 7–13)
ANISOCYTOSIS: ABNORMAL
AST SERPL-CCNC: 13 U/L (ref 0–35)
ATYPICAL LYMPHOCYTE RELATIVE PERCENT: 8 %
BANDED NEUTROPHILS RELATIVE PERCENT: 5 % (ref 5–11)
BASOPHILS ABSOLUTE: 0 K/UL (ref 0–0.2)
BASOPHILS RELATIVE PERCENT: 0.6 %
BILIRUB SERPL-MCNC: 0.1 MG/DL (ref 0–1.2)
BUN BLDV-MCNC: 23 MG/DL (ref 6–20)
BURR CELLS: ABNORMAL
CALCIUM SERPL-MCNC: 9.2 MG/DL (ref 8.6–10.2)
CHLORIDE BLD-SCNC: 101 MEQ/L (ref 98–107)
CHOLESTEROL, TOTAL: 167 MG/DL (ref 0–199)
CO2: 22 MEQ/L (ref 22–29)
CREAT SERPL-MCNC: 0.59 MG/DL (ref 0.5–0.9)
EOSINOPHILS ABSOLUTE: 0.3 K/UL (ref 0–0.7)
EOSINOPHILS RELATIVE PERCENT: 3 %
GFR AFRICAN AMERICAN: >60
GFR NON-AFRICAN AMERICAN: >60
GLOBULIN: 2.7 G/DL (ref 2.3–3.5)
GLUCOSE BLD-MCNC: 237 MG/DL (ref 74–109)
HCT VFR BLD CALC: 43.7 % (ref 37–47)
HDLC SERPL-MCNC: 35 MG/DL (ref 40–59)
HEMOGLOBIN: 14.5 G/DL (ref 12–16)
HEPATITIS C ANTIBODY INTERPRETATION: NORMAL
HYPOCHROMIA: 0
LDL CHOLESTEROL CALCULATED: 76 MG/DL (ref 0–129)
LYMPHOCYTES ABSOLUTE: 2.8 K/UL (ref 1–4.8)
LYMPHOCYTES RELATIVE PERCENT: 23 %
MACROCYTES: 0
MCH RBC QN AUTO: 29.5 PG (ref 27–31.3)
MCHC RBC AUTO-ENTMCNC: 33.2 % (ref 33–37)
MCV RBC AUTO: 88.7 FL (ref 82–100)
MICROCYTES: ABNORMAL
MONOCYTES ABSOLUTE: 0.3 K/UL (ref 0.2–0.8)
MONOCYTES RELATIVE PERCENT: 2.8 %
MYELOCYTE PERCENT: 1 %
NEUTROPHILS ABSOLUTE: 5.8 K/UL (ref 1.4–6.5)
NEUTROPHILS RELATIVE PERCENT: 59 %
PDW BLD-RTO: 14.7 % (ref 11.5–14.5)
PLATELET # BLD: 238 K/UL (ref 130–400)
PLATELET SLIDE REVIEW: ADEQUATE
POIKILOCYTES: ABNORMAL
POLYCHROMASIA: 0
POTASSIUM SERPL-SCNC: 4.7 MEQ/L (ref 3.5–5.1)
RBC # BLD: 4.93 M/UL (ref 4.2–5.4)
SMUDGE CELLS: 2.8
SODIUM BLD-SCNC: 136 MEQ/L (ref 132–144)
TOTAL PROTEIN: 7 G/DL (ref 6.4–8.1)
TRIGL SERPL-MCNC: 281 MG/DL (ref 0–200)
WBC # BLD: 8.9 K/UL (ref 4.8–10.8)

## 2017-07-28 PROCEDURE — 3017F COLORECTAL CA SCREEN DOC REV: CPT | Performed by: PODIATRIST

## 2017-07-28 PROCEDURE — 3014F SCREEN MAMMO DOC REV: CPT | Performed by: PODIATRIST

## 2017-07-28 PROCEDURE — 99203 OFFICE O/P NEW LOW 30 MIN: CPT | Performed by: PODIATRIST

## 2017-07-28 PROCEDURE — G8417 CALC BMI ABV UP PARAM F/U: HCPCS | Performed by: PODIATRIST

## 2017-07-28 PROCEDURE — 4004F PT TOBACCO SCREEN RCVD TLK: CPT | Performed by: PODIATRIST

## 2017-07-28 PROCEDURE — G8427 DOCREV CUR MEDS BY ELIG CLIN: HCPCS | Performed by: PODIATRIST

## 2017-07-28 PROCEDURE — 3046F HEMOGLOBIN A1C LEVEL >9.0%: CPT | Performed by: PODIATRIST

## 2017-07-29 LAB — HIV-1 AND HIV-2 ANTIBODIES: NEGATIVE

## 2017-08-01 DIAGNOSIS — R71.8 ABNORMAL RBC: ICD-10-CM

## 2017-10-24 RX ORDER — GLIMEPIRIDE 2 MG/1
TABLET ORAL
Qty: 180 TABLET | Refills: 1 | Status: SHIPPED | OUTPATIENT
Start: 2017-10-24 | End: 2018-04-01 | Stop reason: SDUPTHER

## 2017-10-26 RX ORDER — CANAGLIFLOZIN 300 MG/1
300 TABLET, FILM COATED ORAL DAILY
Qty: 30 TABLET | Refills: 3 | Status: SHIPPED | OUTPATIENT
Start: 2017-10-26 | End: 2019-07-16 | Stop reason: SDUPTHER

## 2017-10-28 ENCOUNTER — OFFICE VISIT (OUTPATIENT)
Dept: PRIMARY CARE CLINIC | Age: 52
End: 2017-10-28

## 2017-10-28 VITALS
SYSTOLIC BLOOD PRESSURE: 120 MMHG | BODY MASS INDEX: 33.12 KG/M2 | DIASTOLIC BLOOD PRESSURE: 72 MMHG | HEIGHT: 64 IN | HEART RATE: 76 BPM | WEIGHT: 194 LBS

## 2017-10-28 DIAGNOSIS — E78.2 MIXED HYPERLIPIDEMIA: ICD-10-CM

## 2017-10-28 LAB
GLUCOSE BLD-MCNC: 135 MG/DL
HBA1C MFR BLD: 7.2 %

## 2017-10-28 PROCEDURE — 3045F PR MOST RECENT HEMOGLOBIN A1C LEVEL 7.0-9.0%: CPT | Performed by: INTERNAL MEDICINE

## 2017-10-28 PROCEDURE — 3014F SCREEN MAMMO DOC REV: CPT | Performed by: INTERNAL MEDICINE

## 2017-10-28 PROCEDURE — G8427 DOCREV CUR MEDS BY ELIG CLIN: HCPCS | Performed by: INTERNAL MEDICINE

## 2017-10-28 PROCEDURE — 4004F PT TOBACCO SCREEN RCVD TLK: CPT | Performed by: INTERNAL MEDICINE

## 2017-10-28 PROCEDURE — 82962 GLUCOSE BLOOD TEST: CPT | Performed by: INTERNAL MEDICINE

## 2017-10-28 PROCEDURE — 99213 OFFICE O/P EST LOW 20 MIN: CPT | Performed by: INTERNAL MEDICINE

## 2017-10-28 PROCEDURE — 3017F COLORECTAL CA SCREEN DOC REV: CPT | Performed by: INTERNAL MEDICINE

## 2017-10-28 PROCEDURE — G8484 FLU IMMUNIZE NO ADMIN: HCPCS | Performed by: INTERNAL MEDICINE

## 2017-10-28 PROCEDURE — 83036 HEMOGLOBIN GLYCOSYLATED A1C: CPT | Performed by: INTERNAL MEDICINE

## 2017-10-28 PROCEDURE — G8417 CALC BMI ABV UP PARAM F/U: HCPCS | Performed by: INTERNAL MEDICINE

## 2017-10-28 RX ORDER — ATORVASTATIN CALCIUM 20 MG/1
20 TABLET, FILM COATED ORAL DAILY
Qty: 90 TABLET | Refills: 3 | Status: SHIPPED | OUTPATIENT
Start: 2017-10-28 | End: 2018-11-13 | Stop reason: SDUPTHER

## 2017-10-28 NOTE — PROGRESS NOTES
Subjective:      Patient ID: Zaria Romero is a 47 y.o. female. Diabetes   She presents for her follow-up diabetic visit. She has type 2 diabetes mellitus. Her disease course has been stable. Hypoglycemia symptoms include dizziness, nervousness/anxiousness and sweats. Associated symptoms include fatigue and foot paresthesias. There are no hypoglycemic complications. Symptoms are stable. Diabetic complications include peripheral neuropathy. Risk factors for coronary artery disease include diabetes mellitus, obesity, sedentary lifestyle, post-menopausal and tobacco exposure. Current diabetic treatment includes oral agent (triple therapy) (amaryl invokana plus [place] and [place] ). She is compliant with treatment all of the time. Her weight is fluctuating minimally. She is following a generally healthy diet. She participates in exercise intermittently. Her overall blood glucose range is 180-200 mg/dl. (Lab Results       Component                Value               Date                       LABA1C                   7.2                 10/28/2017              ) An ACE inhibitor/angiotensin II receptor blocker is being taken. Hyperlipidemia   This is a chronic problem. The current episode started more than 1 year ago. Exacerbating diseases include diabetes and obesity. Current antihyperlipidemic treatment includes statins. Compliance problems include adherence to diet and adherence to exercise. Risk factors for coronary artery disease include diabetes mellitus, obesity, a sedentary lifestyle, post-menopausal and hypertension. Obesity Body mass index is 33.3 kg/m².       Patient Active Problem List   Diagnosis    Mixed hyperlipidemia    Generalized anxiety disorder    Lumbago    Osteoarthrosis involving lower leg    Non morbid obesity due to excess calories    HNP (herniated nucleus pulposus), lumbar    Uncontrolled diabetes mellitus type 2 without complications [facility])           Current Outpatient Prescriptions: Normal range of motion. Neurological: She is alert and oriented to person, place, and time. Skin: Skin is warm and dry. Psychiatric: She has a normal mood and affect. Assessment:      1. Uncontrolled diabetes mellitus type 2 without complications, unspecified long term insulin use status (HCC)  POCT Glucose    POCT glycosylated hemoglobin (Hb A1C)    Basic Metabolic Panel    Hemoglobin A1C    Microalbumin / Creatinine Urine Ratio   2. Mixed hyperlipidemia  atorvastatin (LIPITOR) 20 MG tablet    Lipid Panel    Hepatic Function Panel           Plan:      Orders Placed This Encounter   Procedures    Basic Metabolic Panel     Standing Status:   Future     Standing Expiration Date:   10/28/2018    Hemoglobin A1C     Standing Status:   Future     Standing Expiration Date:   10/28/2018    Lipid Panel     Standing Status:   Future     Standing Expiration Date:   10/28/2018     Order Specific Question:   Is Patient Fasting?/# of Hours     Answer:   y    Hepatic Function Panel     Standing Status:   Future     Standing Expiration Date:   10/28/2018    Microalbumin / Creatinine Urine Ratio     Standing Status:   Future     Standing Expiration Date:   10/28/2018    POCT Glucose    POCT glycosylated hemoglobin (Hb A1C)     Orders Placed This Encounter   Medications    SITagliptin (JANUVIA) 100 MG tablet     Sig: TAKE 1 TABLET BY MOUTH EVERY DAY     Dispense:  30 tablet     Refill:  3    atorvastatin (LIPITOR) 20 MG tablet     Sig: Take 1 tablet by mouth daily     Dispense:  90 tablet     Refill:  03     The current medical regimen is effective;  continue present plan and medications.

## 2017-11-09 DIAGNOSIS — M25.561 CHRONIC PAIN OF BOTH KNEES: ICD-10-CM

## 2017-11-09 DIAGNOSIS — G89.29 CHRONIC PAIN OF BOTH KNEES: ICD-10-CM

## 2017-11-09 DIAGNOSIS — M25.562 CHRONIC PAIN OF BOTH KNEES: ICD-10-CM

## 2017-11-09 DIAGNOSIS — G89.29 CHRONIC LEFT-SIDED BACK PAIN, UNSPECIFIED BACK LOCATION: ICD-10-CM

## 2017-11-09 DIAGNOSIS — M54.9 CHRONIC LEFT-SIDED BACK PAIN, UNSPECIFIED BACK LOCATION: ICD-10-CM

## 2017-11-09 RX ORDER — TRAZODONE HYDROCHLORIDE 150 MG/1
150 TABLET ORAL NIGHTLY
Qty: 90 TABLET | Refills: 1 | OUTPATIENT
Start: 2017-11-09

## 2017-11-09 RX ORDER — TRAMADOL HYDROCHLORIDE 50 MG/1
50 TABLET ORAL 2 TIMES DAILY PRN
Qty: 180 TABLET | Refills: 0 | OUTPATIENT
Start: 2017-11-09

## 2017-11-09 NOTE — TELEPHONE ENCOUNTER
From: Eliel Castelan  Sent: 11/8/2017 8:09 PM EST  Subject: Medication Renewal Request    Yu Hartley. Zoiertdonnie would like a refill of the following medications:  traMADol (ULTRAM) 50 MG tablet Rosalia Cannon MD]  traZODone (DESYREL) 150 MG tablet Rosalia Cannon MD]    Preferred pharmacy: [address] [place] [address] [phone] - F [phone]    Comment:  Hi there, [facility] are waiting on the refills for these prescriptions. Can you please send my refills to them so i can save some money? ? thank you Jannet Oden Before we can ship your medicine, your doctor needs to approve changing this prescription to home delivery. We've asked your doctor for that approval. We'll let you know if we don't get it. Physician: Rosalia Cannon Physician phone: [phone] this is what i see.

## 2018-02-13 RX ORDER — CANAGLIFLOZIN 300 MG/1
300 TABLET, FILM COATED ORAL DAILY
Qty: 30 TABLET | Refills: 3 | OUTPATIENT
Start: 2018-02-13

## 2018-02-18 RX ORDER — CANAGLIFLOZIN 300 MG/1
300 TABLET, FILM COATED ORAL DAILY
Qty: 30 TABLET | Refills: 3 | Status: SHIPPED | OUTPATIENT
Start: 2018-02-18 | End: 2018-02-21 | Stop reason: SDUPTHER

## 2018-02-21 NOTE — TELEPHONE ENCOUNTER
From: Lilliana Vega  Sent: 2/20/2018 7:47 PM EST  Subject: Medication Renewal Request    Maura Dixon. Unartel would like a refill of the following medications:  INVOKANA 300 MG TABS tablet Alirio Copeland MD]    [id number] [address] [place] [address]    Comment:  PLEASE SEND THIS TO [facility]. Landry Christiansen. I NEED THIS REFILLED. I WENT TO SEE HIM IN OCTOBER AND DON'T KNOW WHY YOUR OFFICE IS GIVING ME SUCH A HARD TIME. .. I AM OUT OF MEDICATION.  Terell Resendiz

## 2018-04-02 RX ORDER — GLIMEPIRIDE 2 MG/1
TABLET ORAL
Qty: 180 TABLET | Refills: 1 | Status: SHIPPED | OUTPATIENT
Start: 2018-04-02 | End: 2018-12-22 | Stop reason: SDUPTHER

## 2018-04-16 ENCOUNTER — TELEPHONE (OUTPATIENT)
Dept: PRIMARY CARE CLINIC | Age: 53
End: 2018-04-16

## 2018-04-20 DIAGNOSIS — E78.2 MIXED HYPERLIPIDEMIA: ICD-10-CM

## 2018-04-20 LAB
ALBUMIN SERPL-MCNC: 4.8 G/DL (ref 3.9–4.9)
ALP BLD-CCNC: 99 U/L (ref 40–130)
ALT SERPL-CCNC: 23 U/L (ref 0–33)
ANION GAP SERPL CALCULATED.3IONS-SCNC: 15 MEQ/L (ref 7–13)
AST SERPL-CCNC: 16 U/L (ref 0–35)
BILIRUB SERPL-MCNC: 0.5 MG/DL (ref 0–1.2)
BILIRUBIN DIRECT: 0.2 MG/DL (ref 0–0.3)
BILIRUBIN, INDIRECT: 0.3 MG/DL (ref 0–0.6)
BUN BLDV-MCNC: 15 MG/DL (ref 6–20)
CALCIUM SERPL-MCNC: 9 MG/DL (ref 8.6–10.2)
CHLORIDE BLD-SCNC: 97 MEQ/L (ref 98–107)
CHOLESTEROL, TOTAL: 189 MG/DL (ref 0–199)
CO2: 25 MEQ/L (ref 22–29)
CREAT SERPL-MCNC: 0.48 MG/DL (ref 0.5–0.9)
CREATININE URINE: 66.8 MG/DL
GFR AFRICAN AMERICAN: >60
GFR NON-AFRICAN AMERICAN: >60
GLUCOSE BLD-MCNC: 74 MG/DL (ref 74–109)
HBA1C MFR BLD: 7.6 % (ref 4.8–5.9)
HDLC SERPL-MCNC: 34 MG/DL (ref 40–59)
LDL CHOLESTEROL CALCULATED: 120 MG/DL (ref 0–129)
MICROALBUMIN UR-MCNC: 6 MG/DL
MICROALBUMIN/CREAT UR-RTO: 89.8 MG/G (ref 0–30)
POTASSIUM SERPL-SCNC: 4.8 MEQ/L (ref 3.5–5.1)
SODIUM BLD-SCNC: 137 MEQ/L (ref 132–144)
TOTAL PROTEIN: 7.2 G/DL (ref 6.4–8.1)
TRIGL SERPL-MCNC: 175 MG/DL (ref 0–200)

## 2018-04-27 ENCOUNTER — TELEPHONE (OUTPATIENT)
Dept: PRIMARY CARE CLINIC | Age: 53
End: 2018-04-27

## 2018-07-19 RX ORDER — CANAGLIFLOZIN 300 MG/1
300 TABLET, FILM COATED ORAL DAILY
Qty: 30 TABLET | Refills: 3 | OUTPATIENT
Start: 2018-07-19

## 2018-07-19 NOTE — TELEPHONE ENCOUNTER
lvm for pt to call back and schedule an appointment/ hit option 2 if she needs short term of medication

## 2018-07-28 RX ORDER — CANAGLIFLOZIN 300 MG/1
300 TABLET, FILM COATED ORAL DAILY
Qty: 30 TABLET | Refills: 3 | OUTPATIENT
Start: 2018-07-28

## 2018-10-10 DIAGNOSIS — E78.2 MIXED HYPERLIPIDEMIA: ICD-10-CM

## 2018-10-10 RX ORDER — ATORVASTATIN CALCIUM 20 MG/1
TABLET, FILM COATED ORAL
Qty: 90 TABLET | Refills: 3 | OUTPATIENT
Start: 2018-10-10

## 2018-11-13 DIAGNOSIS — E78.2 MIXED HYPERLIPIDEMIA: ICD-10-CM

## 2018-11-13 RX ORDER — ATORVASTATIN CALCIUM 20 MG/1
20 TABLET, FILM COATED ORAL DAILY
Qty: 90 TABLET | Refills: 0 | Status: SHIPPED | OUTPATIENT
Start: 2018-11-13 | End: 2018-12-22 | Stop reason: SDUPTHER

## 2018-12-06 RX ORDER — CANAGLIFLOZIN 300 MG/1
300 TABLET, FILM COATED ORAL DAILY
Qty: 30 TABLET | Refills: 3 | OUTPATIENT
Start: 2018-12-06

## 2018-12-22 ENCOUNTER — OFFICE VISIT (OUTPATIENT)
Dept: PRIMARY CARE CLINIC | Age: 53
End: 2018-12-22
Payer: COMMERCIAL

## 2018-12-22 VITALS
SYSTOLIC BLOOD PRESSURE: 120 MMHG | WEIGHT: 187 LBS | BODY MASS INDEX: 31.92 KG/M2 | HEIGHT: 64 IN | HEART RATE: 79 BPM | DIASTOLIC BLOOD PRESSURE: 74 MMHG

## 2018-12-22 DIAGNOSIS — E78.2 MIXED HYPERLIPIDEMIA: ICD-10-CM

## 2018-12-22 LAB
GLUCOSE BLD-MCNC: 124 MG/DL
HBA1C MFR BLD: 8.4 %

## 2018-12-22 PROCEDURE — 83036 HEMOGLOBIN GLYCOSYLATED A1C: CPT | Performed by: INTERNAL MEDICINE

## 2018-12-22 PROCEDURE — G8427 DOCREV CUR MEDS BY ELIG CLIN: HCPCS | Performed by: INTERNAL MEDICINE

## 2018-12-22 PROCEDURE — G8484 FLU IMMUNIZE NO ADMIN: HCPCS | Performed by: INTERNAL MEDICINE

## 2018-12-22 PROCEDURE — 3017F COLORECTAL CA SCREEN DOC REV: CPT | Performed by: INTERNAL MEDICINE

## 2018-12-22 PROCEDURE — 2022F DILAT RTA XM EVC RTNOPTHY: CPT | Performed by: INTERNAL MEDICINE

## 2018-12-22 PROCEDURE — 99213 OFFICE O/P EST LOW 20 MIN: CPT | Performed by: INTERNAL MEDICINE

## 2018-12-22 PROCEDURE — 3045F PR MOST RECENT HEMOGLOBIN A1C LEVEL 7.0-9.0%: CPT | Performed by: INTERNAL MEDICINE

## 2018-12-22 PROCEDURE — 4004F PT TOBACCO SCREEN RCVD TLK: CPT | Performed by: INTERNAL MEDICINE

## 2018-12-22 PROCEDURE — G8417 CALC BMI ABV UP PARAM F/U: HCPCS | Performed by: INTERNAL MEDICINE

## 2018-12-22 PROCEDURE — 82962 GLUCOSE BLOOD TEST: CPT | Performed by: INTERNAL MEDICINE

## 2018-12-22 RX ORDER — GLIMEPIRIDE 2 MG/1
TABLET ORAL
Qty: 180 TABLET | Refills: 1 | Status: SHIPPED | OUTPATIENT
Start: 2018-12-22 | End: 2019-08-10 | Stop reason: SDUPTHER

## 2018-12-22 RX ORDER — ATORVASTATIN CALCIUM 20 MG/1
20 TABLET, FILM COATED ORAL DAILY
Qty: 90 TABLET | Refills: 1 | Status: SHIPPED | OUTPATIENT
Start: 2018-12-22 | End: 2019-06-20 | Stop reason: SDUPTHER

## 2018-12-23 NOTE — PROGRESS NOTES
Subjective:      Patient ID: Jatin Vanessa is a 46 y.o. female. 13 month f/u on diabetes   Diabetes   She presents for her follow-up diabetic visit. She has type 2 diabetes mellitus. Diabetic complications include peripheral neuropathy. Risk factors for coronary artery disease include diabetes mellitus, obesity, sedentary lifestyle and post-menopausal. Current diabetic treatment includes oral agent (triple therapy) (amaryl invokana plus [place] and [place] ). Her overall blood glucose range is 180-200 mg/dl. (Lab Results       Component                Value               Date                       LABA1C                   8.4                 12/22/2018              ) An ACE inhibitor/angiotensin II receptor blocker is being taken. Hyperlipidemia   This is a chronic problem. The current episode started more than 1 year ago. Exacerbating diseases include diabetes and obesity. Current antihyperlipidemic treatment includes statins. Risk factors for coronary artery disease include diabetes mellitus, obesity and hypertension.          Patient Active Problem List   Diagnosis    Mixed hyperlipidemia    Generalized anxiety disorder    Lumbago    Osteoarthrosis involving lower leg    Non morbid obesity due to excess calories    HNP (herniated nucleus pulposus), lumbar    [facility]           Current Outpatient Prescriptions:     canagliflozin (INVOKANA) 300 MG TABS tablet, Take 1 tablet by mouth daily, Disp: 90 tablet, Rfl: 1    SITagliptin (JANUVIA) 100 MG tablet, TAKE 1 TABLET BY MOUTH EVERY DAY, Disp: 90 tablet, Rfl: 1    atorvastatin (LIPITOR) 20 MG tablet, Take 1 tablet by mouth daily, Disp: 90 tablet, Rfl: 1    glimepiride (AMARYL) 2 MG tablet, TAKE 1 TABLET TWICE A DAY, Disp: 180 tablet, Rfl: 1    Liraglutide (VICTOZA) 18 MG/3ML SOPN SC injection, Once a day, Disp: 3 pen, Rfl: 3    Insulin Pen Needle (NOVOFINE) 32G X 6 MM MISC, Once a day, Disp: 100 each, Basic Metabolic Panel    Lipid Panel    Microalbumin / Creatinine Urine Ratio   2.  Mixed hyperlipidemia  atorvastatin (LIPITOR) 20 MG tablet           Plan:      Orders Placed This Encounter   Procedures    Basic Metabolic Panel     Standing Status:   Future     Standing Expiration Date:   12/22/2019    Lipid Panel     Standing Status:   Future     Standing Expiration Date:   12/22/2019     Order Specific Question:   Is Patient Fasting?/# of Hours     Answer:   y    Microalbumin / Creatinine Urine Ratio     Standing Status:   Future     Standing Expiration Date:   12/22/2019    POCT Glucose    POCT glycosylated hemoglobin (Hb A1C)     Orders Placed This Encounter   Medications    canagliflozin (INVOKANA) 300 MG TABS tablet     Sig: Take 1 tablet by mouth daily     Dispense:  90 tablet     Refill:  1    SITagliptin (JANUVIA) 100 MG tablet     Sig: TAKE 1 TABLET BY MOUTH EVERY DAY     Dispense:  90 tablet     Refill:  1    atorvastatin (LIPITOR) 20 MG tablet     Sig: Take 1 tablet by mouth daily     Dispense:  90 tablet     Refill:  1    glimepiride (AMARYL) 2 MG tablet     Sig: TAKE 1 TABLET TWICE A DAY     Dispense:  180 tablet     Refill:  1    Liraglutide (VICTOZA) 18 MG/3ML SOPN SC injection     Sig: Once a day     Dispense:  3 pen     Refill:  3    Insulin Pen Needle (NOVOFINE) 32G X 6 MM MISC     Sig: Once a day     Dispense:  100 each     Refill:  3     Add victoza to [place] and amaryl   hbaic goal of 7

## 2018-12-31 ENCOUNTER — TELEPHONE (OUTPATIENT)
Dept: ENDOCRINOLOGY | Age: 53
End: 2018-12-31

## 2019-01-02 ENCOUNTER — TELEPHONE (OUTPATIENT)
Dept: PRIMARY CARE CLINIC | Age: 54
End: 2019-01-02

## 2019-01-14 ENCOUNTER — PATIENT MESSAGE (OUTPATIENT)
Dept: OTHER | Facility: CLINIC | Age: 54
End: 2019-01-14

## 2019-01-15 LAB
ANION GAP SERPL CALCULATED.3IONS-SCNC: 13 MEQ/L (ref 7–13)
BUN BLDV-MCNC: 25 MG/DL (ref 6–20)
CALCIUM SERPL-MCNC: 10 MG/DL (ref 8.6–10.2)
CHLORIDE BLD-SCNC: 101 MEQ/L (ref 98–107)
CHOLESTEROL, TOTAL: 212 MG/DL (ref 0–199)
CO2: 26 MEQ/L (ref 22–29)
CREAT SERPL-MCNC: 0.46 MG/DL (ref 0.5–0.9)
CREATININE URINE: 77.3 MG/DL
GFR AFRICAN AMERICAN: >60
GFR NON-AFRICAN AMERICAN: >60
GLUCOSE BLD-MCNC: 120 MG/DL (ref 74–109)
HDLC SERPL-MCNC: 35 MG/DL (ref 40–59)
LDL CHOLESTEROL CALCULATED: 110 MG/DL (ref 0–129)
MICROALBUMIN UR-MCNC: <1.2 MG/DL
MICROALBUMIN/CREAT UR-RTO: NORMAL MG/G (ref 0–30)
POTASSIUM SERPL-SCNC: 5.3 MEQ/L (ref 3.5–5.1)
SODIUM BLD-SCNC: 140 MEQ/L (ref 132–144)
TRIGL SERPL-MCNC: 334 MG/DL (ref 0–200)

## 2019-01-30 ENCOUNTER — TELEPHONE (OUTPATIENT)
Dept: ENDOCRINOLOGY | Age: 54
End: 2019-01-30

## 2019-02-06 ENCOUNTER — PATIENT MESSAGE (OUTPATIENT)
Dept: PRIMARY CARE CLINIC | Age: 54
End: 2019-02-06

## 2019-03-23 ENCOUNTER — OFFICE VISIT (OUTPATIENT)
Dept: PRIMARY CARE CLINIC | Age: 54
End: 2019-03-23
Payer: COMMERCIAL

## 2019-03-23 VITALS
BODY MASS INDEX: 31.41 KG/M2 | WEIGHT: 184 LBS | HEIGHT: 64 IN | SYSTOLIC BLOOD PRESSURE: 138 MMHG | HEART RATE: 85 BPM | DIASTOLIC BLOOD PRESSURE: 68 MMHG

## 2019-03-23 DIAGNOSIS — E78.2 MIXED HYPERLIPIDEMIA: ICD-10-CM

## 2019-03-23 LAB
GLUCOSE BLD-MCNC: 127 MG/DL
HBA1C MFR BLD: 8.4 %

## 2019-03-23 PROCEDURE — 3045F PR MOST RECENT HEMOGLOBIN A1C LEVEL 7.0-9.0%: CPT | Performed by: INTERNAL MEDICINE

## 2019-03-23 PROCEDURE — 83036 HEMOGLOBIN GLYCOSYLATED A1C: CPT | Performed by: INTERNAL MEDICINE

## 2019-03-23 PROCEDURE — 3017F COLORECTAL CA SCREEN DOC REV: CPT | Performed by: INTERNAL MEDICINE

## 2019-03-23 PROCEDURE — 2022F DILAT RTA XM EVC RTNOPTHY: CPT | Performed by: INTERNAL MEDICINE

## 2019-03-23 PROCEDURE — G8417 CALC BMI ABV UP PARAM F/U: HCPCS | Performed by: INTERNAL MEDICINE

## 2019-03-23 PROCEDURE — 99203 OFFICE O/P NEW LOW 30 MIN: CPT | Performed by: INTERNAL MEDICINE

## 2019-03-23 PROCEDURE — 82962 GLUCOSE BLOOD TEST: CPT | Performed by: INTERNAL MEDICINE

## 2019-03-23 PROCEDURE — 4004F PT TOBACCO SCREEN RCVD TLK: CPT | Performed by: INTERNAL MEDICINE

## 2019-03-23 PROCEDURE — G8427 DOCREV CUR MEDS BY ELIG CLIN: HCPCS | Performed by: INTERNAL MEDICINE

## 2019-03-23 PROCEDURE — G8484 FLU IMMUNIZE NO ADMIN: HCPCS | Performed by: INTERNAL MEDICINE

## 2019-04-15 ENCOUNTER — TELEPHONE (OUTPATIENT)
Dept: ENDOCRINOLOGY | Age: 54
End: 2019-04-15

## 2019-04-16 ENCOUNTER — APPOINTMENT (OUTPATIENT)
Dept: ULTRASOUND IMAGING | Age: 54
End: 2019-04-16
Payer: COMMERCIAL

## 2019-04-16 ENCOUNTER — APPOINTMENT (OUTPATIENT)
Dept: CT IMAGING | Age: 54
End: 2019-04-16
Payer: COMMERCIAL

## 2019-04-16 ENCOUNTER — HOSPITAL ENCOUNTER (EMERGENCY)
Age: 54
Discharge: HOME | End: 2019-04-17
Payer: COMMERCIAL

## 2019-04-16 DIAGNOSIS — R10.11 ABDOMINAL PAIN, RIGHT UPPER QUADRANT: Primary | ICD-10-CM

## 2019-04-16 LAB
ALBUMIN SERPL-MCNC: 4.6 G/DL (ref 3.5–4.6)
ALP BLD-CCNC: 97 U/L (ref 40–130)
ALT SERPL-CCNC: 16 U/L (ref 0–33)
AMYLASE: 82 U/L (ref 22–93)
ANION GAP SERPL CALCULATED.3IONS-SCNC: 15 MEQ/L (ref 9–15)
AST SERPL-CCNC: 16 U/L (ref 0–35)
BASOPHILS ABSOLUTE: 0.1 K/UL (ref 0–0.2)
BASOPHILS RELATIVE PERCENT: 0.6 %
BILIRUB SERPL-MCNC: <0.2 MG/DL (ref 0.2–0.7)
BILIRUBIN URINE: NEGATIVE
BLOOD, URINE: NEGATIVE
BUN BLDV-MCNC: 14 MG/DL (ref 6–20)
CALCIUM SERPL-MCNC: 9.4 MG/DL (ref 8.5–9.9)
CHLORIDE BLD-SCNC: 100 MEQ/L (ref 95–107)
CLARITY: CLEAR
CO2: 25 MEQ/L (ref 20–31)
COLOR: YELLOW
CREAT SERPL-MCNC: 0.53 MG/DL (ref 0.5–0.9)
EOSINOPHILS ABSOLUTE: 0.1 K/UL (ref 0–0.7)
EOSINOPHILS RELATIVE PERCENT: 1.3 %
GFR AFRICAN AMERICAN: >60
GFR NON-AFRICAN AMERICAN: >60
GLOBULIN: 3 G/DL (ref 2.3–3.5)
GLUCOSE BLD-MCNC: 118 MG/DL (ref 70–99)
GLUCOSE URINE: >=1000 MG/DL
HCT VFR BLD CALC: 45.9 % (ref 37–47)
HEMOGLOBIN: 15.6 G/DL (ref 12–16)
KETONES, URINE: NEGATIVE MG/DL
LACTIC ACID: 2 MMOL/L (ref 0.5–2.2)
LEUKOCYTE ESTERASE, URINE: NEGATIVE
LIPASE: 101 U/L (ref 12–95)
LYMPHOCYTES ABSOLUTE: 2.9 K/UL (ref 1–4.8)
LYMPHOCYTES RELATIVE PERCENT: 29 %
MCH RBC QN AUTO: 30.5 PG (ref 27–31.3)
MCHC RBC AUTO-ENTMCNC: 33.9 % (ref 33–37)
MCV RBC AUTO: 90 FL (ref 82–100)
MONOCYTES ABSOLUTE: 0.3 K/UL (ref 0.2–0.8)
MONOCYTES RELATIVE PERCENT: 3.1 %
NEUTROPHILS ABSOLUTE: 6.6 K/UL (ref 1.4–6.5)
NEUTROPHILS RELATIVE PERCENT: 66 %
NITRITE, URINE: NEGATIVE
PDW BLD-RTO: 14 % (ref 11.5–14.5)
PH UA: 5 (ref 5–9)
PLATELET # BLD: 257 K/UL (ref 130–400)
POTASSIUM SERPL-SCNC: 4 MEQ/L (ref 3.4–4.9)
PROTEIN UA: NEGATIVE MG/DL
RBC # BLD: 5.1 M/UL (ref 4.2–5.4)
SODIUM BLD-SCNC: 140 MEQ/L (ref 135–144)
SPECIFIC GRAVITY UA: 1.05 (ref 1–1.03)
TOTAL PROTEIN: 7.6 G/DL (ref 6.3–8)
URINE REFLEX TO CULTURE: ABNORMAL
UROBILINOGEN, URINE: 0.2 E.U./DL
WBC # BLD: 10.1 K/UL (ref 4.8–10.8)

## 2019-04-16 PROCEDURE — 74177 CT ABD & PELVIS W/CONTRAST: CPT

## 2019-04-16 PROCEDURE — 36415 COLL VENOUS BLD VENIPUNCTURE: CPT

## 2019-04-16 PROCEDURE — 81003 URINALYSIS AUTO W/O SCOPE: CPT

## 2019-04-16 PROCEDURE — 96375 TX/PRO/DX INJ NEW DRUG ADDON: CPT

## 2019-04-16 PROCEDURE — 6360000004 HC RX CONTRAST MEDICATION: Performed by: RADIOLOGY

## 2019-04-16 PROCEDURE — 76705 ECHO EXAM OF ABDOMEN: CPT

## 2019-04-16 PROCEDURE — 82150 ASSAY OF AMYLASE: CPT

## 2019-04-16 PROCEDURE — 99284 EMERGENCY DEPT VISIT MOD MDM: CPT

## 2019-04-16 PROCEDURE — 2580000003 HC RX 258: Performed by: PHYSICIAN ASSISTANT

## 2019-04-16 PROCEDURE — 85025 COMPLETE CBC W/AUTO DIFF WBC: CPT

## 2019-04-16 PROCEDURE — 6360000002 HC RX W HCPCS: Performed by: PHYSICIAN ASSISTANT

## 2019-04-16 PROCEDURE — 80053 COMPREHEN METABOLIC PANEL: CPT

## 2019-04-16 PROCEDURE — 96374 THER/PROPH/DIAG INJ IV PUSH: CPT

## 2019-04-16 PROCEDURE — 83605 ASSAY OF LACTIC ACID: CPT

## 2019-04-16 PROCEDURE — 83690 ASSAY OF LIPASE: CPT

## 2019-04-16 RX ORDER — 0.9 % SODIUM CHLORIDE 0.9 %
1000 INTRAVENOUS SOLUTION INTRAVENOUS ONCE
Status: COMPLETED | OUTPATIENT
Start: 2019-04-16 | End: 2019-04-16

## 2019-04-16 RX ORDER — SODIUM CHLORIDE 0.9 % (FLUSH) 0.9 %
10 SYRINGE (ML) INJECTION ONCE
Status: DISCONTINUED | OUTPATIENT
Start: 2019-04-16 | End: 2019-04-17 | Stop reason: HOSPADM

## 2019-04-16 RX ORDER — ONDANSETRON 2 MG/ML
4 INJECTION INTRAMUSCULAR; INTRAVENOUS ONCE
Status: COMPLETED | OUTPATIENT
Start: 2019-04-16 | End: 2019-04-16

## 2019-04-16 RX ORDER — KETOROLAC TROMETHAMINE 30 MG/ML
30 INJECTION, SOLUTION INTRAMUSCULAR; INTRAVENOUS ONCE
Status: COMPLETED | OUTPATIENT
Start: 2019-04-16 | End: 2019-04-16

## 2019-04-16 RX ADMIN — IOPAMIDOL 100 ML: 612 INJECTION, SOLUTION INTRAVENOUS at 23:49

## 2019-04-16 RX ADMIN — SODIUM CHLORIDE 1000 ML: 9 INJECTION, SOLUTION INTRAVENOUS at 21:54

## 2019-04-16 RX ADMIN — KETOROLAC TROMETHAMINE 30 MG: 30 INJECTION, SOLUTION INTRAMUSCULAR at 21:54

## 2019-04-16 RX ADMIN — ONDANSETRON 4 MG: 2 INJECTION INTRAMUSCULAR; INTRAVENOUS at 21:54

## 2019-04-16 ASSESSMENT — ENCOUNTER SYMPTOMS
SHORTNESS OF BREATH: 0
DIARRHEA: 0
RHINORRHEA: 0
BACK PAIN: 0
COUGH: 0
ABDOMINAL PAIN: 1
VOMITING: 0
PHOTOPHOBIA: 0
EYE PAIN: 0
NAUSEA: 1
SORE THROAT: 0

## 2019-04-16 ASSESSMENT — PAIN DESCRIPTION - FREQUENCY: FREQUENCY: CONTINUOUS

## 2019-04-16 ASSESSMENT — PAIN DESCRIPTION - PAIN TYPE: TYPE: ACUTE PAIN;CHRONIC PAIN

## 2019-04-16 ASSESSMENT — PAIN SCALES - GENERAL: PAINLEVEL_OUTOF10: 7

## 2019-04-16 ASSESSMENT — PAIN DESCRIPTION - LOCATION: LOCATION: ABDOMEN

## 2019-04-16 ASSESSMENT — PAIN DESCRIPTION - DESCRIPTORS: DESCRIPTORS: SHOOTING

## 2019-04-16 ASSESSMENT — PAIN DESCRIPTION - ORIENTATION: ORIENTATION: RIGHT;UPPER

## 2019-04-17 ENCOUNTER — OFFICE VISIT (OUTPATIENT)
Dept: SURGERY | Age: 54
End: 2019-04-17
Payer: COMMERCIAL

## 2019-04-17 ENCOUNTER — TELEPHONE (OUTPATIENT)
Dept: ENDOCRINOLOGY | Age: 54
End: 2019-04-17

## 2019-04-17 VITALS
BODY MASS INDEX: 31.39 KG/M2 | SYSTOLIC BLOOD PRESSURE: 128 MMHG | WEIGHT: 188.4 LBS | DIASTOLIC BLOOD PRESSURE: 86 MMHG | TEMPERATURE: 96.8 F | HEIGHT: 65 IN

## 2019-04-17 VITALS
OXYGEN SATURATION: 100 % | BODY MASS INDEX: 31.07 KG/M2 | HEART RATE: 85 BPM | RESPIRATION RATE: 17 BRPM | TEMPERATURE: 97.9 F | WEIGHT: 181 LBS | DIASTOLIC BLOOD PRESSURE: 89 MMHG | SYSTOLIC BLOOD PRESSURE: 133 MMHG

## 2019-04-17 DIAGNOSIS — R10.11 RIGHT UPPER QUADRANT ABDOMINAL PAIN: Primary | ICD-10-CM

## 2019-04-17 PROCEDURE — G8417 CALC BMI ABV UP PARAM F/U: HCPCS | Performed by: SURGERY

## 2019-04-17 PROCEDURE — G8427 DOCREV CUR MEDS BY ELIG CLIN: HCPCS | Performed by: SURGERY

## 2019-04-17 PROCEDURE — 4004F PT TOBACCO SCREEN RCVD TLK: CPT | Performed by: SURGERY

## 2019-04-17 PROCEDURE — 99202 OFFICE O/P NEW SF 15 MIN: CPT | Performed by: SURGERY

## 2019-04-17 PROCEDURE — 3017F COLORECTAL CA SCREEN DOC REV: CPT | Performed by: SURGERY

## 2019-04-17 RX ORDER — HYDROCODONE BITARTRATE AND ACETAMINOPHEN 5; 325 MG/1; MG/1
1 TABLET ORAL EVERY 6 HOURS PRN
Qty: 10 TABLET | Refills: 0 | Status: SHIPPED | OUTPATIENT
Start: 2019-04-17 | End: 2019-04-20

## 2019-04-17 RX ORDER — ONDANSETRON 4 MG/1
4-8 TABLET, ORALLY DISINTEGRATING ORAL EVERY 12 HOURS PRN
Qty: 12 TABLET | Refills: 0 | Status: SHIPPED | OUTPATIENT
Start: 2019-04-17

## 2019-04-17 ASSESSMENT — ENCOUNTER SYMPTOMS
NAUSEA: 1
CHEST TIGHTNESS: 0
DIARRHEA: 1
COLOR CHANGE: 0
RECTAL PAIN: 0
ABDOMINAL DISTENTION: 0
ABDOMINAL PAIN: 1
SHORTNESS OF BREATH: 0
BLOOD IN STOOL: 0
RHINORRHEA: 0
ALLERGIC/IMMUNOLOGIC NEGATIVE: 1

## 2019-04-17 ASSESSMENT — PAIN DESCRIPTION - PAIN TYPE
TYPE: ACUTE PAIN
TYPE: ACUTE PAIN

## 2019-04-17 ASSESSMENT — PAIN SCALES - GENERAL: PAINLEVEL_OUTOF10: 3

## 2019-04-17 ASSESSMENT — PAIN DESCRIPTION - LOCATION
LOCATION: ABDOMEN
LOCATION: ABDOMEN

## 2019-04-17 ASSESSMENT — PAIN DESCRIPTION - DESCRIPTORS: DESCRIPTORS: ACHING

## 2019-04-17 NOTE — ED NOTES
Pt given DC info. Pt verbalized understanding of info. No s/sym of distress.      Damaso Saldaña RN  04/17/19 1568

## 2019-04-17 NOTE — ED TRIAGE NOTES
Pt presents from home with c/o abd pain. Onset x2 days. Pt reports possible gall bladder attack. Pt a&o x4. resp even. Skin p/w/d. Pt reports pain to RUQ abd. Reports anorexia, nausea, no active emesis. deneis dysuria. Reports hx of gall bladder attacks.  Pt has not followed up with specialist.

## 2019-04-17 NOTE — PROGRESS NOTES
Subjective:      Patient ID: Anthony Crews is a 46 y.o. female. HPI Anthony Crews is a 46 y.o. female referred by [facility] ER for intermittent RUQ abdominal pain. The pain is sharp and is 5/10 in intensity. The pain started about 1 year ago but worse over the last 3 days  Aggravating factors: none  Alleviating factors: recumbency  Associated symptoms: diarrhea and nausea  Abdominal mass is not noted by the patient. Weight loss is noted(15 pounds the last year). Fever is not noted. CT and GB ultrasound are normal. Colonoscopy and EGD have not been done. She has had previous abdominal surgery(appendectomy). Review of Systems   Constitutional: Negative for activity change, appetite change and unexpected weight change. HENT: Negative for congestion, nosebleeds, rhinorrhea and sneezing. Eyes: Negative for visual disturbance. Respiratory: Negative for chest tightness and shortness of breath. Cardiovascular: Negative for chest pain and leg swelling. Gastrointestinal: Positive for abdominal pain, diarrhea and nausea. Negative for abdominal distention, blood in stool and rectal pain. Endocrine: Negative. Negative for polyuria. Genitourinary: Negative for difficulty urinating. Musculoskeletal: Negative. Skin: Negative for color change. Allergic/Immunologic: Negative. Neurological: Negative for seizures, light-headedness, numbness and headaches. Hematological: Does not bruise/bleed easily. Psychiatric/Behavioral: Negative for sleep disturbance. Objective:   Physical Exam   Constitutional: She is oriented to person, place, and time. She appears well-developed and well-nourished. No distress. HENT:   Mouth/Throat: Oropharynx is clear and moist.   Eyes: Pupils are equal, round, and reactive to light. No scleral icterus. Abdominal: There is no hepatosplenomegaly. There is tenderness in the right upper quadrant. No hernia.    Musculoskeletal:   Normal gait   Neurological: She is alert and oriented to person, place, and time. Skin: No bruising, no lesion and no rash noted. Psychiatric: She has a normal mood and affect. Judgment normal.     /86   Temp 96.8 °F (36 °C) (Temporal)   Ht 5' 5\" (1.651 m)   Wt 188 lb 6.4 oz (85.5 kg)   BMI 31.35 kg/m²   Assessment:      Right upper quadrant abdominal pain unclear etiology      Plan:      HIDA scan  The patient is instructed to return to see me in 1 week.          Meenakshi Devine MD

## 2019-04-17 NOTE — ED PROVIDER NOTES
[facility] ED  eMERGENCY dEPARTMENT eNCOUnter      Pt Name: Anthony Crews  MRN: [id number]  [place] 1965  Date of evaluation: 4/16/2019  Provider: Tatiana Bermudez PA-C      HISTORY OF PRESENT ILLNESS    Anthony Crews is a 46 y.o. female who presents to the Emergency Department with abdominal pain, ruq. This began 3 days ago. Pain is constant and sharp. Associated nausea and decreased appetite. This pain has happened several times before but usually goes a way on its own. Eating makes the pain worse. Pt thinks it might be her gallbladder. Last bm today and normal. No otc meds tried. Denies fevers chills. REVIEW OF SYSTEMS       Review of Systems   Constitutional: Positive for appetite change. Negative for chills, diaphoresis, fatigue and fever. HENT: Negative for congestion, rhinorrhea and sore throat. Eyes: Negative for photophobia and pain. Respiratory: Negative for cough and shortness of breath. Cardiovascular: Negative for chest pain and palpitations. Gastrointestinal: Positive for abdominal pain and nausea. Negative for diarrhea and vomiting. Genitourinary: Negative for dysuria and flank pain. Musculoskeletal: Negative for back pain. Skin: Negative for rash. Neurological: Negative for dizziness, light-headedness and headaches. Psychiatric/Behavioral: Negative. All other systems reviewed and are negative.         PAST MEDICAL HISTORY     Past Medical History:   Diagnosis Date    Anxiety state, unspecified     Headache(784.0)     Lumbago     surgery dr Angie Guardado Mixed hyperlipidemia     meds 2 months    Non morbid obesity due to excess calories 2/13/2016    Primary localized osteoarthrosis, lower leg     Type II diabetes mellitus, uncontrolled ([facility]     dx 1 yr         SURGICAL HISTORY       Past Surgical History:   Procedure Laterality Date    APPENDECTOMY  1977    DILATION AND CURETTAGE OF UTERUS      ENDOMETRIAL ABLATION      LAMINECTOMY Right 3/27/2017    RIGHT L5-S1 MICRODISKECTOMY, 1 HR  performed by Onofre Ramos MD at [address] [facility] ARTHROSCOPY Right     RCR    TUBAL LIGATION  1999         CURRENT MEDICATIONS       Previous Medications    ALCOHOL SWABS 70 % PADS    USE TWICE DAILY    ATORVASTATIN (LIPITOR) 20 MG TABLET    Take 1 tablet by mouth daily    BLOOD GLUCOSE MONITORING SUPPL (FREESTYLE LITE) ALVARO    1 Device by Does not apply route daily as needed    CANAGLIFLOZIN (INVOKANA) 300 MG TABS TABLET    Take 1 tablet by mouth daily    CANAGLIFLOZIN (INVOKANA) 300 MG TABS TABLET    Take 1 tablet by mouth daily    DULAGLUTIDE (TRULICITY) 4.60 UA/3.1AE SOPN    Once a week    FREESTYLE LANCETS MISC    USE AS DIRECTED DAILY    GLIMEPIRIDE (AMARYL) 2 MG TABLET    TAKE 1 TABLET TWICE A DAY    GLUCOSE BLOOD VI TEST STRIPS (FREESTYLE LITE) STRIP    Pt test 3x daily dx E11.65    IBUPROFEN (ADVIL;MOTRIN) 800 MG TABLET    TK 1 T PO  BID PRN P    INSULIN PEN NEEDLE (NOVOFINE) 32G X 6 MM MISC    Once a day    INSULIN PEN NEEDLE 31G X 6 MM MISC    Use 1 daily    INVOKANA 300 MG TABS TABLET    TAKE 1 TABLET BY MOUTH DAILY    SITAGLIPTIN (JANUVIA) 100 MG TABLET    TAKE 1 TABLET BY MOUTH EVERY DAY    SITAGLIPTIN (JANUVIA) 100 MG TABLET    Use as directed    TRAZODONE (DESYREL) 150 MG TABLET    Take 1 tablet by mouth nightly       ALLERGIES     Metformin and related    FAMILY HISTORY       Family History   Problem Relation Age of Onset    Breast Cancer Mother     Uterine Cancer Mother     Diabetes Paternal Grandmother     Uterine Cancer Other     High Cholesterol Other           SOCIAL HISTORY       Social History     Socioeconomic History    Marital status:      Spouse name: None    Number of children: None    Years of education: None    Highest education level: None   Occupational History    None   Social Needs    Financial resource strain: None    Food insecurity:     Worry: None     Inability: None    Transportation needs:     Medical: None     Non-medical: None   Tobacco Use    Smoking status: Current Every Day Smoker     Packs/day: 1.00     Years: 38.00     Pack years: 38.00     Types: Cigarettes    Smokeless tobacco: Never Used    Tobacco comment: pt trying to cut back   Substance and Sexual Activity    Alcohol use: Yes     Comment: rare holidays    Drug use: No    Sexual activity: None   Lifestyle    Physical activity:     Days per week: None     Minutes per session: None    Stress: None   Relationships    Social connections:     Talks on phone: None     Gets together: None     Attends Restorationist service: None     Active member of club or organization: None     Attends meetings of clubs or organizations: None     Relationship status: None    Intimate partner violence:     Fear of current or ex partner: None     Emotionally abused: None     Physically abused: None     Forced sexual activity: None   Other Topics Concern    None   Social History Narrative    None       SCREENINGS      @FLOW([id number])@      PHYSICAL EXAM    (up to 7 for level 4, 8 or more for level 5)     ED Triage Vitals   BP Temp Temp Source Pulse Resp SpO2 Height Weight   04/16/19 2050 04/16/19 2050 04/16/19 2050 04/16/19 2050 04/16/19 2050 04/16/19 2050 -- 04/16/19 2047   (!) 138/96 97.9 °F (36.6 °C) Oral 79 18 98 %  181 lb (82.1 kg)       Physical Exam   Constitutional: She is oriented to person, place, and time. She appears well-developed and well-nourished. She is active. No distress. HENT:   Head: Normocephalic and atraumatic. Mouth/Throat: Mucous membranes are normal.   Eyes: Conjunctivae and lids are normal.   Neck: Normal range of motion. Neck supple. Cardiovascular: Normal rate, regular rhythm, normal heart sounds, intact distal pulses and normal pulses. Pulmonary/Chest: Effort normal and breath sounds normal.   Abdominal: Soft. Normal appearance and bowel sounds are normal. There is tenderness in the right upper quadrant and epigastric area.  There is positive Bruce's sign. There is no rigidity, no rebound, no guarding, no CVA tenderness and no tenderness at McBurney's point. Lymphadenopathy:     She has no cervical adenopathy. Neurological: She is alert and oriented to person, place, and time. She has normal strength. Skin: Skin is warm, dry and intact. Capillary refill takes less than 2 seconds. No rash noted. She is not diaphoretic. Psychiatric: Judgment and thought content normal.   Nursing note and vitals reviewed. All other labs were within normal range or not returned as of this dictation. EMERGENCY DEPARTMENT COURSE and DIFFERENTIALDIAGNOSIS/MDM:   Vitals:    Vitals:    04/16/19 2047 04/16/19 2050 04/17/19 0018   BP:  (!) 138/96 133/89   Pulse:  79 85   Resp:  18 17   Temp:  97.9 °F (36.6 °C)    TempSrc:  Oral    SpO2:  98% 100%   Weight: 181 lb (82.1 kg)              Pt presents as described. Concerned for gallbladder disease. US negative. CT unremarkable pt medicated with zofran, morphine and 1L ns. No white count and no fever. Pt has been resting comfortably. I suspect biliary colic. Given f/u with gen surg in 1 week and f/u with pcp in 2 days. Return to ed for new or worsening symptoms. Stable and ready for d/c,       PROCEDURES:  Unless otherwise noted below, none     Procedures      FINAL IMPRESSION      1.  Abdominal pain, right upper quadrant          DISPOSITION/PLAN   DISPOSITION Decision To Discharge 04/17/2019 12:24:53 AM          Yari Sherman (electronically signed)  Attending Emergency Physician       Jahaira Slater PA-C  04/17/19 0040

## 2019-04-19 ENCOUNTER — HOSPITAL ENCOUNTER (OUTPATIENT)
Dept: NUCLEAR MEDICINE | Age: 54
Discharge: HOME | End: 2019-04-21
Payer: COMMERCIAL

## 2019-04-19 DIAGNOSIS — R10.11 RIGHT UPPER QUADRANT ABDOMINAL PAIN: ICD-10-CM

## 2019-04-19 PROCEDURE — 3430000000 HC RX DIAGNOSTIC RADIOPHARMACEUTICAL: Performed by: SURGERY

## 2019-04-19 PROCEDURE — A9537 TC99M MEBROFENIN: HCPCS | Performed by: SURGERY

## 2019-04-19 PROCEDURE — 78227 HEPATOBIL SYST IMAGE W/DRUG: CPT

## 2019-04-19 RX ADMIN — Medication 7.1 MILLICURIE: at 10:14

## 2019-04-24 ENCOUNTER — OFFICE VISIT (OUTPATIENT)
Dept: SURGERY | Age: 54
End: 2019-04-24
Payer: COMMERCIAL

## 2019-04-24 VITALS
WEIGHT: 182 LBS | SYSTOLIC BLOOD PRESSURE: 118 MMHG | BODY MASS INDEX: 30.32 KG/M2 | DIASTOLIC BLOOD PRESSURE: 80 MMHG | HEIGHT: 65 IN | TEMPERATURE: 98.9 F

## 2019-04-24 DIAGNOSIS — R10.11 RIGHT UPPER QUADRANT ABDOMINAL PAIN: Primary | ICD-10-CM

## 2019-04-24 DIAGNOSIS — R07.81 RIB PAIN ON RIGHT SIDE: ICD-10-CM

## 2019-04-24 PROCEDURE — 99213 OFFICE O/P EST LOW 20 MIN: CPT | Performed by: SURGERY

## 2019-04-24 PROCEDURE — G8427 DOCREV CUR MEDS BY ELIG CLIN: HCPCS | Performed by: SURGERY

## 2019-04-24 PROCEDURE — 3017F COLORECTAL CA SCREEN DOC REV: CPT | Performed by: SURGERY

## 2019-04-24 PROCEDURE — 4004F PT TOBACCO SCREEN RCVD TLK: CPT | Performed by: SURGERY

## 2019-04-24 PROCEDURE — G8417 CALC BMI ABV UP PARAM F/U: HCPCS | Performed by: SURGERY

## 2019-04-24 ASSESSMENT — ENCOUNTER SYMPTOMS
NAUSEA: 1
BLOOD IN STOOL: 0
ALLERGIC/IMMUNOLOGIC NEGATIVE: 1
ABDOMINAL PAIN: 1
CHEST TIGHTNESS: 0
SHORTNESS OF BREATH: 0
DIARRHEA: 1
COLOR CHANGE: 0
RHINORRHEA: 0
RECTAL PAIN: 0
ABDOMINAL DISTENTION: 0

## 2019-04-24 NOTE — PROGRESS NOTES
Chaperone for Intimate Exam    1. Was chaperone offered as part of the rooming process? offered, declined   2. If Chaperone is declined by patient, NA: chaperone was available and exam completed  3.  Chaperone is n/a
reactive to light. No scleral icterus. Pulmonary/Chest:       Abdominal: There is no hepatosplenomegaly. There is tenderness in the right upper quadrant. No hernia. Musculoskeletal:   Normal gait   Neurological: She is alert and oriented to person, place, and time. Skin: No bruising, no lesion and no rash noted. Psychiatric: She has a normal mood and affect. Judgment normal.     /80   Temp 98.9 °F (37.2 °C) (Temporal)   Ht 5' 5\" (1.651 m)   Wt 182 lb (82.6 kg)   BMI 30.29 kg/m²   Assessment:      Right upper quadrant abdominal and right anterior rib cage pain unclear etiology  No evidence of GB disease      Plan:      Motrin 400 mg bid  I have offered GI and pain management consult but she declined. The patient is instructed to return to see me in 1 month.          Rosibel Worthington MD

## 2019-06-20 DIAGNOSIS — E78.2 MIXED HYPERLIPIDEMIA: ICD-10-CM

## 2019-06-20 RX ORDER — ATORVASTATIN CALCIUM 20 MG/1
TABLET, FILM COATED ORAL
Qty: 90 TABLET | Refills: 1 | Status: SHIPPED | OUTPATIENT
Start: 2019-06-20 | End: 2019-08-10 | Stop reason: SDUPTHER

## 2019-06-20 NOTE — TELEPHONE ENCOUNTER
Pharmacy is requesting medication refill. Please approve or deny this request.    Rx requested:  Requested Prescriptions     Pending Prescriptions Disp Refills    atorvastatin (LIPITOR) 20 MG tablet [Pharmacy Med Name: ATORVASTATIN TABS 20MG] 90 tablet 1     Sig: TAKE 1 TABLET DAILY         Last Office Visit:   3/23/2019      Next Visit Date:  No future appointments.

## 2019-07-09 ENCOUNTER — PATIENT MESSAGE (OUTPATIENT)
Dept: OTHER | Facility: CLINIC | Age: 54
End: 2019-07-09

## 2019-07-16 RX ORDER — CANAGLIFLOZIN 300 MG/1
TABLET, FILM COATED ORAL
Qty: 90 TABLET | Refills: 1 | Status: SHIPPED | OUTPATIENT
Start: 2019-07-16 | End: 2019-08-10 | Stop reason: SDUPTHER

## 2019-08-05 ENCOUNTER — PATIENT MESSAGE (OUTPATIENT)
Dept: OTHER | Facility: CLINIC | Age: 54
End: 2019-08-05

## 2019-08-10 ENCOUNTER — OFFICE VISIT (OUTPATIENT)
Age: 54
End: 2019-08-10
Payer: COMMERCIAL

## 2019-08-10 VITALS
BODY MASS INDEX: 30.16 KG/M2 | HEIGHT: 65 IN | WEIGHT: 181 LBS | DIASTOLIC BLOOD PRESSURE: 70 MMHG | HEART RATE: 90 BPM | SYSTOLIC BLOOD PRESSURE: 106 MMHG

## 2019-08-10 DIAGNOSIS — E78.2 MIXED HYPERLIPIDEMIA: ICD-10-CM

## 2019-08-10 LAB
CHP ED QC CHECK: NORMAL
GLUCOSE BLD-MCNC: 122 MG/DL

## 2019-08-10 PROCEDURE — G8427 DOCREV CUR MEDS BY ELIG CLIN: HCPCS | Performed by: INTERNAL MEDICINE

## 2019-08-10 PROCEDURE — 3045F PR MOST RECENT HEMOGLOBIN A1C LEVEL 7.0-9.0%: CPT | Performed by: INTERNAL MEDICINE

## 2019-08-10 PROCEDURE — 2022F DILAT RTA XM EVC RTNOPTHY: CPT | Performed by: INTERNAL MEDICINE

## 2019-08-10 PROCEDURE — 4004F PT TOBACCO SCREEN RCVD TLK: CPT | Performed by: INTERNAL MEDICINE

## 2019-08-10 PROCEDURE — 82962 GLUCOSE BLOOD TEST: CPT | Performed by: INTERNAL MEDICINE

## 2019-08-10 PROCEDURE — G8417 CALC BMI ABV UP PARAM F/U: HCPCS | Performed by: INTERNAL MEDICINE

## 2019-08-10 PROCEDURE — 3017F COLORECTAL CA SCREEN DOC REV: CPT | Performed by: INTERNAL MEDICINE

## 2019-08-10 PROCEDURE — 99213 OFFICE O/P EST LOW 20 MIN: CPT | Performed by: INTERNAL MEDICINE

## 2019-08-10 RX ORDER — GLIMEPIRIDE 2 MG/1
TABLET ORAL
Qty: 180 TABLET | Refills: 1 | Status: SHIPPED | OUTPATIENT
Start: 2019-08-10 | End: 2020-02-22 | Stop reason: SDUPTHER

## 2019-08-10 RX ORDER — ATORVASTATIN CALCIUM 20 MG/1
TABLET, FILM COATED ORAL
Qty: 90 TABLET | Refills: 1 | Status: SHIPPED | OUTPATIENT
Start: 2019-08-10 | End: 2019-12-11 | Stop reason: SDUPTHER

## 2019-08-10 NOTE — PROGRESS NOTES
time. She appears well-developed and well-nourished. HENT:   Head: Normocephalic and atraumatic. Eyes: EOM are normal.   Neck: Neck supple. Cardiovascular: Normal rate and regular rhythm. Pulmonary/Chest: Breath sounds normal. She has no wheezes. She has no rales. Musculoskeletal: Normal range of motion. She exhibits no edema. Feet:    Neurological: She is alert and oriented to person, place, and time. Skin: Skin is warm. Psychiatric: She has a normal mood and affect.        Assessment:      Orders Placed This Encounter   Procedures    Basic Metabolic Panel     Standing Status:   Future     Standing Expiration Date:   8/9/2020    Hemoglobin A1C     Standing Status:   Future     Standing Expiration Date:   8/9/2020    Lipid Panel     Standing Status:   Future     Standing Expiration Date:   8/10/2020     Order Specific Question:   Is Patient Fasting?/# of Hours     Answer:   y    POCT Glucose           Plan:      Orders Placed This Encounter   Procedures    Basic Metabolic Panel     Standing Status:   Future     Standing Expiration Date:   8/9/2020    Hemoglobin A1C     Standing Status:   Future     Standing Expiration Date:   8/9/2020    Lipid Panel     Standing Status:   Future     Standing Expiration Date:   8/10/2020     Order Specific Question:   Is Patient Fasting?/# of Hours     Answer:   y    POCT Glucose     Orders Placed This Encounter   Medications    canagliflozin (INVOKANA) 300 MG TABS tablet     Sig: TAKE 1 TABLET BY MOUTH EVERY DAY     Dispense:  90 tablet     Refill:  1    Dulaglutide (TRULICITY) 6.02 YM/4.9IY SOPN     Sig: Once a week     Dispense:  4 pen     Refill:  3    atorvastatin (LIPITOR) 20 MG tablet     Sig: TAKE 1 TABLET DAILY     Dispense:  90 tablet     Refill:  1    SITagliptin (JANUVIA) 100 MG tablet     Sig: TAKE 1 TABLET BY MOUTH EVERY DAY     Dispense:  90 tablet     Refill:  1    glimepiride (AMARYL) 2 MG tablet     Sig: TAKE 1 TABLET TWICE A DAY Dispense:  180 tablet     Refill:  1     F/u in 6 months         Fab Reina MD

## 2019-08-12 ENCOUNTER — PATIENT MESSAGE (OUTPATIENT)
Dept: OTHER | Facility: CLINIC | Age: 54
End: 2019-08-12

## 2019-11-20 DIAGNOSIS — Z12.11 COLON CANCER SCREENING: Primary | ICD-10-CM

## 2019-12-11 DIAGNOSIS — E78.2 MIXED HYPERLIPIDEMIA: ICD-10-CM

## 2019-12-26 RX ORDER — ATORVASTATIN CALCIUM 20 MG/1
TABLET, FILM COATED ORAL
Qty: 90 TABLET | Refills: 1 | Status: SHIPPED | OUTPATIENT
Start: 2019-12-26 | End: 2020-05-23 | Stop reason: SDUPTHER

## 2020-02-22 ENCOUNTER — OFFICE VISIT (OUTPATIENT)
Dept: ENDOCRINOLOGY | Age: 55
End: 2020-02-22
Payer: COMMERCIAL

## 2020-02-22 VITALS
SYSTOLIC BLOOD PRESSURE: 116 MMHG | WEIGHT: 187 LBS | BODY MASS INDEX: 31.16 KG/M2 | HEIGHT: 65 IN | DIASTOLIC BLOOD PRESSURE: 64 MMHG | HEART RATE: 83 BPM

## 2020-02-22 LAB
ANION GAP SERPL CALCULATED.3IONS-SCNC: 14 MEQ/L (ref 9–15)
BUN BLDV-MCNC: 18 MG/DL (ref 6–20)
CALCIUM SERPL-MCNC: 9.5 MG/DL (ref 8.5–9.9)
CHLORIDE BLD-SCNC: 103 MEQ/L (ref 95–107)
CHP ED QC CHECK: NORMAL
CO2: 24 MEQ/L (ref 20–31)
CREAT SERPL-MCNC: 0.55 MG/DL (ref 0.5–0.9)
CREATININE URINE: 79.3 MG/DL
GFR AFRICAN AMERICAN: >60
GFR NON-AFRICAN AMERICAN: >60
GLUCOSE BLD-MCNC: 122 MG/DL (ref 70–99)
GLUCOSE BLD-MCNC: 128 MG/DL
HBA1C MFR BLD: 8.2 % (ref 4.8–5.9)
MICROALBUMIN UR-MCNC: <1.2 MG/DL
MICROALBUMIN/CREAT UR-RTO: NORMAL MG/G (ref 0–30)
POTASSIUM SERPL-SCNC: 4.5 MEQ/L (ref 3.4–4.9)
SODIUM BLD-SCNC: 141 MEQ/L (ref 135–144)

## 2020-02-22 PROCEDURE — G8484 FLU IMMUNIZE NO ADMIN: HCPCS | Performed by: INTERNAL MEDICINE

## 2020-02-22 PROCEDURE — 99213 OFFICE O/P EST LOW 20 MIN: CPT | Performed by: INTERNAL MEDICINE

## 2020-02-22 PROCEDURE — G8417 CALC BMI ABV UP PARAM F/U: HCPCS | Performed by: INTERNAL MEDICINE

## 2020-02-22 PROCEDURE — 3017F COLORECTAL CA SCREEN DOC REV: CPT | Performed by: INTERNAL MEDICINE

## 2020-02-22 PROCEDURE — 2022F DILAT RTA XM EVC RTNOPTHY: CPT | Performed by: INTERNAL MEDICINE

## 2020-02-22 PROCEDURE — 82962 GLUCOSE BLOOD TEST: CPT | Performed by: INTERNAL MEDICINE

## 2020-02-22 PROCEDURE — G8427 DOCREV CUR MEDS BY ELIG CLIN: HCPCS | Performed by: INTERNAL MEDICINE

## 2020-02-22 PROCEDURE — 4004F PT TOBACCO SCREEN RCVD TLK: CPT | Performed by: INTERNAL MEDICINE

## 2020-02-22 PROCEDURE — 3046F HEMOGLOBIN A1C LEVEL >9.0%: CPT | Performed by: INTERNAL MEDICINE

## 2020-02-22 RX ORDER — GLIMEPIRIDE 2 MG/1
TABLET ORAL
Qty: 180 TABLET | Refills: 1 | Status: SHIPPED | OUTPATIENT
Start: 2020-02-22

## 2020-02-22 RX ORDER — TRAZODONE HYDROCHLORIDE 150 MG/1
150 TABLET ORAL NIGHTLY
Qty: 30 TABLET | Refills: 5 | Status: SHIPPED | OUTPATIENT
Start: 2020-02-22 | End: 2020-12-02 | Stop reason: SDUPTHER

## 2020-02-22 NOTE — PROGRESS NOTES
Subjective:      Patient ID: Sonido Boyd is a 53 y.o. female. 6  month f/u on diabetes has not seen pcp   Requests refills no labs to review glucose mostly below 493   Stopped trulicity due to cost   Diabetes   She presents for her follow-up diabetic visit. She has type 2 diabetes mellitus. There are no hypoglycemic complications. Current diabetic treatments: januvia invokana  amaryl. Her overall blood glucose range is 140-180 mg/dl. Obesity Body mass index is 31.12 kg/m².         Patient Active Problem List   Diagnosis    Mixed hyperlipidemia    Generalized anxiety disorder    Lumbago    Osteoarthrosis involving lower leg    Non morbid obesity due to excess calories    HNP (herniated nucleus pulposus), lumbar    Uncontrolled diabetes mellitus type 2 without complications ([facility] [address]    Right upper quadrant abdominal pain    Rib pain on right side     Allergies   Allergen Reactions    Metformin And Related      Diarrhea        Current Outpatient Medications:     traZODone (DESYREL) 150 MG tablet, Take 1 tablet by mouth nightly, Disp: 30 tablet, Rfl: 5    canagliflozin (INVOKANA) 300 MG TABS tablet, TAKE 1 TABLET BY MOUTH EVERY DAY, Disp: 90 tablet, Rfl: 1    canagliflozin (INVOKANA) 300 MG TABS tablet, Take 1 tablet by mouth daily, Disp: 90 tablet, Rfl: 1    glimepiride (AMARYL) 2 MG tablet, TAKE 1 TABLET TWICE A DAY, Disp: 180 tablet, Rfl: 1    atorvastatin (LIPITOR) 20 MG tablet, TAKE 1 TABLET DAILY, Disp: 90 tablet, Rfl: 1    atorvastatin (LIPITOR) 20 MG tablet, TAKE 1 TABLET DAILY, Disp: 90 tablet, Rfl: 3    Dulaglutide (TRULICITY) 6.57 VC/5.7UH SOPN, Lot [id number] Exp 11/2019 given by [facility] on 04/08/2019, Disp: 4 pen, Rfl: 0    Dulaglutide (TRULICITY) 5.80 OZ/8.4FN SOPN, Lot [id number] Exp 11/2019 given by Valentina beach [facility] on 04/17/2019, Disp: 2 pen, Rfl: 0    canagliflozin (INVOKANA) 300 MG TABS tablet, Lot [id number] Exp 07/2021 and Lot [id number] Exp 12/2019 given by Valentina beach [facility] on

## 2020-02-27 ENCOUNTER — OFFICE VISIT (OUTPATIENT)
Dept: PRIMARY CARE CLINIC | Age: 55
End: 2020-02-27
Payer: COMMERCIAL

## 2020-02-27 VITALS
HEART RATE: 123 BPM | OXYGEN SATURATION: 98 % | WEIGHT: 187 LBS | RESPIRATION RATE: 14 BRPM | DIASTOLIC BLOOD PRESSURE: 80 MMHG | HEIGHT: 65 IN | BODY MASS INDEX: 31.16 KG/M2 | SYSTOLIC BLOOD PRESSURE: 126 MMHG | TEMPERATURE: 97.9 F

## 2020-02-27 PROCEDURE — 3017F COLORECTAL CA SCREEN DOC REV: CPT | Performed by: INTERNAL MEDICINE

## 2020-02-27 PROCEDURE — G8484 FLU IMMUNIZE NO ADMIN: HCPCS | Performed by: INTERNAL MEDICINE

## 2020-02-27 PROCEDURE — 4004F PT TOBACCO SCREEN RCVD TLK: CPT | Performed by: INTERNAL MEDICINE

## 2020-02-27 PROCEDURE — G8417 CALC BMI ABV UP PARAM F/U: HCPCS | Performed by: INTERNAL MEDICINE

## 2020-02-27 PROCEDURE — 99214 OFFICE O/P EST MOD 30 MIN: CPT | Performed by: INTERNAL MEDICINE

## 2020-02-27 PROCEDURE — G8427 DOCREV CUR MEDS BY ELIG CLIN: HCPCS | Performed by: INTERNAL MEDICINE

## 2020-02-27 RX ORDER — CYCLOBENZAPRINE HCL 10 MG
10 TABLET ORAL 2 TIMES DAILY PRN
Qty: 60 TABLET | Refills: 1 | Status: SHIPPED | OUTPATIENT
Start: 2020-02-27 | End: 2020-04-27

## 2020-02-27 RX ORDER — CYCLOBENZAPRINE HCL 10 MG
10 TABLET ORAL 2 TIMES DAILY PRN
Qty: 60 TABLET | Refills: 1 | Status: SHIPPED | OUTPATIENT
Start: 2020-02-27 | End: 2020-02-27

## 2020-02-27 RX ORDER — PROMETHAZINE HYDROCHLORIDE AND CODEINE PHOSPHATE 6.25; 1 MG/5ML; MG/5ML
5 SYRUP ORAL 4 TIMES DAILY PRN
Qty: 118 ML | Refills: 0 | Status: SHIPPED | OUTPATIENT
Start: 2020-02-27 | End: 2020-03-05

## 2020-02-27 RX ORDER — CEFUROXIME AXETIL 500 MG/1
500 TABLET ORAL 2 TIMES DAILY
Qty: 20 TABLET | Refills: 0 | Status: SHIPPED | OUTPATIENT
Start: 2020-02-27 | End: 2020-03-08

## 2020-02-27 ASSESSMENT — PATIENT HEALTH QUESTIONNAIRE - PHQ9
1. LITTLE INTEREST OR PLEASURE IN DOING THINGS: 0
SUM OF ALL RESPONSES TO PHQ QUESTIONS 1-9: 0
2. FEELING DOWN, DEPRESSED OR HOPELESS: 0
SUM OF ALL RESPONSES TO PHQ9 QUESTIONS 1 & 2: 0
SUM OF ALL RESPONSES TO PHQ QUESTIONS 1-9: 0

## 2020-02-29 ENCOUNTER — HOSPITAL ENCOUNTER (OUTPATIENT)
Dept: WOMENS IMAGING | Age: 55
Discharge: HOME | End: 2020-03-02
Payer: COMMERCIAL

## 2020-02-29 PROCEDURE — 77067 SCR MAMMO BI INCL CAD: CPT

## 2020-03-03 ASSESSMENT — ENCOUNTER SYMPTOMS
CHOKING: 0
RHINORRHEA: 1
HEMOPTYSIS: 0
BACK PAIN: 1
ABDOMINAL DISTENTION: 0
BLOOD IN STOOL: 0
HEARTBURN: 0
APNEA: 0
PHOTOPHOBIA: 0
COUGH: 1
FACIAL SWELLING: 0

## 2020-04-06 RX ORDER — IBUPROFEN 800 MG/1
TABLET ORAL
Qty: 60 TABLET | Refills: 5 | Status: SHIPPED | OUTPATIENT
Start: 2020-04-06 | End: 2020-12-02 | Stop reason: SDUPTHER

## 2020-04-28 RX ORDER — SULFAMETHOXAZOLE AND TRIMETHOPRIM 800; 160 MG/1; MG/1
1 TABLET ORAL 2 TIMES DAILY
Qty: 20 TABLET | Refills: 0 | Status: SHIPPED | OUTPATIENT
Start: 2020-04-28 | End: 2020-05-08

## 2020-04-28 RX ORDER — SULFACETAMIDE SODIUM 100 MG/ML
2 SOLUTION/ DROPS OPHTHALMIC 4 TIMES DAILY
Qty: 10 ML | Refills: 0 | Status: SHIPPED | OUTPATIENT
Start: 2020-04-28 | End: 2020-05-08

## 2020-05-08 RX ORDER — LANCETS 28 GAUGE
EACH MISCELLANEOUS
Qty: 300 EACH | Refills: 1 | Status: SHIPPED | OUTPATIENT
Start: 2020-05-08

## 2020-05-08 RX ORDER — BLOOD-GLUCOSE METER
KIT MISCELLANEOUS
Qty: 300 EACH | Refills: 1 | Status: SHIPPED | OUTPATIENT
Start: 2020-05-08

## 2020-05-22 ENCOUNTER — TELEPHONE (OUTPATIENT)
Dept: ENDOCRINOLOGY | Age: 55
End: 2020-05-22

## 2020-05-23 ENCOUNTER — VIRTUAL VISIT (OUTPATIENT)
Dept: ENDOCRINOLOGY | Age: 55
End: 2020-05-23
Payer: COMMERCIAL

## 2020-05-23 PROCEDURE — 99213 OFFICE O/P EST LOW 20 MIN: CPT | Performed by: INTERNAL MEDICINE

## 2020-05-23 RX ORDER — ATORVASTATIN CALCIUM 20 MG/1
TABLET, FILM COATED ORAL
Qty: 90 TABLET | Refills: 3 | Status: SHIPPED | OUTPATIENT
Start: 2020-05-23 | End: 2021-05-19 | Stop reason: ALTCHOICE

## 2020-05-23 NOTE — PROGRESS NOTES
E11.65  Pattie Becker MD   ibuprofen (ADVIL;MOTRIN) 800 MG tablet TK 1 T PO  BID PRN P  Ale Kamara MD   traZODone (DESYREL) 150 MG tablet Take 1 tablet by mouth nightly  Pattie Becker MD   canagliflozin (INVOKANA) 300 MG TABS tablet TAKE 1 TABLET BY MOUTH EVERY DAY  Huan Corona MD   canagliflozin (INVOKANA) 300 MG TABS tablet Take 1 tablet by mouth daily  Pattie Becker MD   glimepiride (AMARYL) 2 MG tablet TAKE 1 TABLET TWICE A DAY  Huan Corona MD   atorvastatin (LIPITOR) 20 MG tablet TAKE 1 TABLET DAILY  Huan Corona MD   atorvastatin (LIPITOR) 20 MG tablet TAKE 1 TABLET DAILY  Ale Kamara MD   Dulaglutide (TRULICITY) 3.74 WM/8.4KD SOPN Lot [id number] Exp 11/2019 given by [facility] on 04/08/2019  Pattie Becker MD   Dulaglutide (TRULICITY) 1.25 MN/5.5IX SOPN Lot [id number] Exp 11/2019 given by [facility] on 04/17/2019  Pattie Becker MD   canagliflozin (INVOKANA) 300 MG TABS tablet Lot [id number] Exp 07/2021 and Lot [id number] Exp 12/2019 given by [facility] on 04/17/2019  Pattie Becker MD   SITagliptin (JANUVIA) 100 MG tablet TAKE 1 TABLET BY MOUTH EVERY DAY  Huan Corona MD   Dulaglutide (TRULICITY) 4.10 ET/5.7NJ SOPN Once a week  Huan Corona MD   ondansetron (ZOFRAN ODT) 4 MG disintegrating tablet Take 1-2 tablets by mouth every 12 hours as needed for Nausea May Sub regular tablet (non-ODT) if insurance does not cover ODT. [address] PA-C   Insulin Pen Needle 31G X 6 MM MISC Use 1 daily  Pattie Becker MD   Alcohol Swabs 70 % PADS USE TWICE DAILY  Huan Corona MD   Blood Glucose Monitoring Suppl (FREESTYLE LITE) ALVARO 1 Device by Does not apply route daily as needed  Pattie Becker MD       Social History     Tobacco Use    Smoking status: Current Every Day Smoker     Packs/day: 1.00     Years: 38.00     Pack years: 38.00     Types: Cigarettes    Smokeless tobacco: Never Used    Tobacco comment: pt trying to cut back   Substance Use Topics    Alcohol use: Yes     Comment: rare holidays    Drug use:  No PHYSICAL EXAMINATION:  [ INSTRUCTIONS:  \"[x]\" Indicates a positive item  \"[]\" Indicates a negative item  -- DELETE ALL ITEMS NOT EXAMINED]  [] Alert  [] Oriented to person/place/time    [] No apparent distress  [] Toxic appearing    [] Face flushed appearing [] Sclera clear  [] Lips are cyanotic      [] Breathing appears normal  [] Appears tachypneic      [] Rash on visible skin    [] Cranial Nerves II-XII grossly intact    [] Motor grossly intact in visible upper extremities    [] Motor grossly intact in visible lower extremities    [] Normal Mood  [] Anxious appearing    [] Depressed appearing  [] Confused appearing      [] Poor short term memory  [] Poor long term memory    [] OTHER:      Due to this being a TeleHealth encounter, evaluation of the following organ systems is limited: Vitals/Constitutional/EENT/Resp/CV/GI//MS/Neuro/Skin/Heme-Lymph-Imm. ASSESSMENT/PLAN:     Diagnosis Orders   1. Uncontrolled diabetes mellitus type 2 without complications (HCC)  Basic Metabolic Panel    Hemoglobin A1C    Lipid Panel   2. Mixed hyperlipidemia  atorvastatin (LIPITOR) 20 MG tablet     Orders Placed This Encounter   Medications    atorvastatin (LIPITOR) 20 MG tablet     Sig: TAKE 1 TABLET DAILY     Dispense:  90 tablet     Refill:  3     Continue Januvia 100 mg daily Invokana 300 mg daily and glimepiride 2 mg twice daily repeat labs in 4 to 6 weeks time    A1c goal of less than 7    Total time spent with patient was 15 minutes      An  electronic signature was used to authenticate this note.     --Brittayn Turpin MD on 5/23/2020 at 9:44 AM        Pursuant to the emergency declaration under the [facility], [facility] waiver authority and the [employer] Act, this Virtual  Visit was conducted, with patient's consent, to reduce the patient's risk of exposure to COVID-19 and provide continuity of care for an established patient. Services were provided through a video synchronous discussion virtually to substitute for in-person clinic visit.

## 2020-09-19 ENCOUNTER — TELEPHONE (OUTPATIENT)
Dept: FAMILY MEDICINE CLINIC | Age: 55
End: 2020-09-19

## 2020-12-07 PROBLEM — E11.9 TYPE 2 DIABETES MELLITUS WITHOUT COMPLICATION (HCC): Status: ACTIVE | Noted: 2017-03-21

## 2020-12-17 RX ORDER — TRAZODONE HYDROCHLORIDE 150 MG/1
150 TABLET ORAL NIGHTLY
Qty: 30 TABLET | Refills: 0 | Status: SHIPPED | OUTPATIENT
Start: 2020-12-17 | End: 2021-01-26 | Stop reason: SDUPTHER

## 2021-01-15 DIAGNOSIS — B37.31 VAGINA, CANDIDIASIS: Primary | ICD-10-CM

## 2021-01-15 RX ORDER — FLUCONAZOLE 150 MG/1
150 TABLET ORAL DAILY
Qty: 2 TABLET | Refills: 1 | Status: SHIPPED | OUTPATIENT
Start: 2021-01-15 | End: 2021-05-19 | Stop reason: ALTCHOICE

## 2021-01-27 RX ORDER — TRAZODONE HYDROCHLORIDE 150 MG/1
150 TABLET ORAL NIGHTLY
Qty: 30 TABLET | Refills: 0 | Status: SHIPPED | OUTPATIENT
Start: 2021-01-27 | End: 2021-05-28 | Stop reason: SDUPTHER

## 2021-03-23 RX ORDER — CANAGLIFLOZIN 300 MG/1
TABLET, FILM COATED ORAL
Qty: 30 TABLET | Refills: 3 | Status: SHIPPED | OUTPATIENT
Start: 2021-03-23 | End: 2021-05-19 | Stop reason: ALTCHOICE

## 2021-04-01 DIAGNOSIS — M54.50 ACUTE LEFT-SIDED LOW BACK PAIN WITHOUT SCIATICA: ICD-10-CM

## 2021-04-01 RX ORDER — IBUPROFEN 800 MG/1
TABLET ORAL
Qty: 60 TABLET | Refills: 0 | Status: SHIPPED | OUTPATIENT
Start: 2021-04-01

## 2021-04-01 NOTE — TELEPHONE ENCOUNTER
Pharmacy requesting medication refill. Please approve or deny this request.    Rx requested:  Requested Prescriptions     Pending Prescriptions Disp Refills    ibuprofen (ADVIL;MOTRIN) 800 MG tablet [Pharmacy Med Name: Ibuprofen Oral Tablet 800 MG] 60 tablet 0     Sig: TAKE 1 TABLET BY MOUTH TWO TIMES A DAY AS NEEDED         Last Office Visit:   2/27/2020      Next Visit Date:  No future appointments. Pt seen post op and doing well  Reports mild cramping no pain or bleeding    Starting h/h was 13 1 / 39  Post op was 9 pm  12 9/42 2  Midnight - pending     Cont close follow up plan is stable for dc home tomorrow

## 2021-04-23 DIAGNOSIS — J06.9 UPPER RESPIRATORY TRACT INFECTION, UNSPECIFIED TYPE: Primary | ICD-10-CM

## 2021-04-23 RX ORDER — CEFUROXIME AXETIL 500 MG/1
500 TABLET ORAL 2 TIMES DAILY
Qty: 20 TABLET | Refills: 0 | Status: SHIPPED | OUTPATIENT
Start: 2021-04-23 | End: 2021-05-03

## 2021-05-04 ENCOUNTER — TELEPHONE (OUTPATIENT)
Dept: PRIMARY CARE CLINIC | Age: 56
End: 2021-05-04

## 2021-05-10 DIAGNOSIS — R73.9 HYPERGLYCEMIA: Primary | ICD-10-CM

## 2021-05-19 ENCOUNTER — OFFICE VISIT (OUTPATIENT)
Dept: PRIMARY CARE CLINIC | Age: 56
End: 2021-05-19
Payer: COMMERCIAL

## 2021-05-19 VITALS
RESPIRATION RATE: 16 BRPM | OXYGEN SATURATION: 96 % | HEIGHT: 65 IN | BODY MASS INDEX: 31.02 KG/M2 | HEART RATE: 84 BPM | WEIGHT: 186.2 LBS | DIASTOLIC BLOOD PRESSURE: 80 MMHG | SYSTOLIC BLOOD PRESSURE: 126 MMHG

## 2021-05-19 DIAGNOSIS — B02.9 HERPES ZOSTER WITHOUT COMPLICATION: Primary | ICD-10-CM

## 2021-05-19 DIAGNOSIS — Z13.220 SCREENING FOR HYPERLIPIDEMIA: ICD-10-CM

## 2021-05-19 DIAGNOSIS — R73.9 HYPERGLYCEMIA: ICD-10-CM

## 2021-05-19 DIAGNOSIS — L30.9 DERMATITIS: ICD-10-CM

## 2021-05-19 DIAGNOSIS — Z12.31 ENCOUNTER FOR SCREENING MAMMOGRAM FOR BREAST CANCER: ICD-10-CM

## 2021-05-19 DIAGNOSIS — Z12.11 SCREENING FOR COLON CANCER: ICD-10-CM

## 2021-05-19 LAB
ANION GAP SERPL CALCULATED.3IONS-SCNC: 13 MEQ/L (ref 9–15)
BUN BLDV-MCNC: 16 MG/DL (ref 6–20)
CALCIUM SERPL-MCNC: 10 MG/DL (ref 8.5–9.9)
CHLORIDE BLD-SCNC: 102 MEQ/L (ref 95–107)
CHOLESTEROL, TOTAL: 193 MG/DL (ref 0–199)
CO2: 23 MEQ/L (ref 20–31)
CREAT SERPL-MCNC: 0.56 MG/DL (ref 0.5–0.9)
GFR AFRICAN AMERICAN: >60
GFR NON-AFRICAN AMERICAN: >60
GLUCOSE BLD-MCNC: 126 MG/DL (ref 70–99)
HBA1C MFR BLD: 8.6 % (ref 4.8–5.9)
HDLC SERPL-MCNC: 36 MG/DL (ref 40–59)
LDL CHOLESTEROL CALCULATED: 118 MG/DL (ref 0–129)
POTASSIUM SERPL-SCNC: 4.3 MEQ/L (ref 3.4–4.9)
SODIUM BLD-SCNC: 138 MEQ/L (ref 135–144)
TRIGL SERPL-MCNC: 195 MG/DL (ref 0–150)

## 2021-05-19 PROCEDURE — 99214 OFFICE O/P EST MOD 30 MIN: CPT | Performed by: NURSE PRACTITIONER

## 2021-05-19 PROCEDURE — G8417 CALC BMI ABV UP PARAM F/U: HCPCS | Performed by: NURSE PRACTITIONER

## 2021-05-19 PROCEDURE — 4004F PT TOBACCO SCREEN RCVD TLK: CPT | Performed by: NURSE PRACTITIONER

## 2021-05-19 PROCEDURE — 3017F COLORECTAL CA SCREEN DOC REV: CPT | Performed by: NURSE PRACTITIONER

## 2021-05-19 PROCEDURE — G8427 DOCREV CUR MEDS BY ELIG CLIN: HCPCS | Performed by: NURSE PRACTITIONER

## 2021-05-19 RX ORDER — VALACYCLOVIR HYDROCHLORIDE 1 G/1
1000 TABLET, FILM COATED ORAL 3 TIMES DAILY
Qty: 21 TABLET | Refills: 0 | Status: SHIPPED | OUTPATIENT
Start: 2021-05-19 | End: 2021-05-26

## 2021-05-19 SDOH — ECONOMIC STABILITY: TRANSPORTATION INSECURITY
IN THE PAST 12 MONTHS, HAS LACK OF TRANSPORTATION KEPT YOU FROM MEETINGS, WORK, OR FROM GETTING THINGS NEEDED FOR DAILY LIVING?: NO

## 2021-05-19 SDOH — ECONOMIC STABILITY: FOOD INSECURITY: WITHIN THE PAST 12 MONTHS, THE FOOD YOU BOUGHT JUST DIDN'T LAST AND YOU DIDN'T HAVE MONEY TO GET MORE.: NEVER TRUE

## 2021-05-19 ASSESSMENT — ENCOUNTER SYMPTOMS
SINUS PAIN: 0
WHEEZING: 0
ABDOMINAL DISTENTION: 0
COUGH: 0
SHORTNESS OF BREATH: 0
DIARRHEA: 0
CONSTIPATION: 0
VOMITING: 0
EYE ITCHING: 0
CHEST TIGHTNESS: 0
APNEA: 0
SORE THROAT: 0
RHINORRHEA: 0
EYE REDNESS: 0
NAUSEA: 0
TROUBLE SWALLOWING: 0

## 2021-05-19 ASSESSMENT — PATIENT HEALTH QUESTIONNAIRE - PHQ9
SUM OF ALL RESPONSES TO PHQ QUESTIONS 1-9: 0
1. LITTLE INTEREST OR PLEASURE IN DOING THINGS: 0

## 2021-05-19 ASSESSMENT — SOCIAL DETERMINANTS OF HEALTH (SDOH): HOW HARD IS IT FOR YOU TO PAY FOR THE VERY BASICS LIKE FOOD, HOUSING, MEDICAL CARE, AND HEATING?: NOT HARD AT ALL

## 2021-05-19 NOTE — PATIENT INSTRUCTIONS
Patient Education        Atopic Dermatitis: Care Instructions  Your Care Instructions  Atopic dermatitis (also called eczema) is a skin problem that causes intense itching and a red, raised rash. In severe cases, the rash develops clear fluidfilled blisters. The rash is not contagious. People with this condition seem to have very sensitive immune systems that are likely to react to things that cause allergies. The immune system is the body's way of fighting infection. There is no cure for atopic dermatitis, but you may be able to control it with care at home. Follow-up care is a key part of your treatment and safety. Be sure to make and go to all appointments, and call your doctor if you are having problems. It's also a good idea to know your test results and keep a list of the medicines you take. How can you care for yourself at home? · Use moisturizer at least twice a day. · If your doctor prescribes a cream, use it as directed. If your doctor prescribes other medicine, take it exactly as directed. · Wash the affected area with water only. Soap can make dryness and itching worse. Pat dry. · Apply a moisturizer after bathing. Use a cream such as Lubriderm, Moisturel, or Cetaphil that does not irritate the skin or cause a rash. Apply the cream while your skin is still damp after lightly drying with a towel. · Use cold, wet cloths to reduce itching. · Keep cool, and stay out of the sun. · If itching affects your normal activities, an over-the-counter antihistamine, such as diphenhydramine (Benadryl) or loratadine (Claritin) may help. Read and follow all instructions on the label. When should you call for help? Call your doctor now or seek immediate medical care if:    · Your rash gets worse and you have a fever.     · You have new blisters or bruises, or the rash spreads and looks like a sunburn.     · You have signs of infection, such as:  ? Increased pain, swelling, warmth, or redness. ?  Red streaks leading from the rash. ? Pus draining from the rash. ? A fever.     · You have crusting or oozing sores.     · You have joint aches or body aches along with your rash. Watch closely for changes in your health, and be sure to contact your doctor if:    · Your rash does not clear up after 2 to 3 weeks of home treatment.     · Itching interferes with your sleep or daily activities. Where can you learn more? Go to [email or web address]://[email or web address] SolutionspeTakeacoder.[email or web address] org and sign in to your DrAvailable account. Enter F155 in the [employer] box to learn more about \"Atopic Dermatitis: Care Instructions. \"     If you do not have an account, please click on the \"Sign Up Now\" link. Current as of: July 2, 2020               Content Version: 12.8  © 2006-2021 [employer]. Care instructions adapted under license by [facility] ([facility]). If you have questions about a medical condition or this instruction, always ask your healthcare professional. [place] [address] any warranty or liability for your use of this information. Patient Education        Dermatitis: Care Instructions  Your Care Instructions  Dermatitis is the general name used for any rash or inflammation of the skin. Different kinds of dermatitis cause different kinds of rashes. Common causes of a rash include new medicines, plants (such as poison oak or poison ivy), heat, and stress. Certain illnesses can also cause a rash. An allergic reaction to something that touches your skin, such as latex, nickel, or poison ivy, is called contact dermatitis. Contact dermatitis may also be caused by something that irritates the skin, such as bleach, a chemical, or soap. These types of rashes cannot be spread from person to person. How long your rash will last depends on what caused it. Rashes may last a few days or months. Follow-up care is a key part of your treatment and safety.  Be sure to make and go to all appointments, and call your professional. [address] any warranty or liability for your use of this information. Patient Education        Colon Cancer Screening: Care Instructions  Your Care Instructions     Colorectal cancer occurs in the colon or rectum. That's the lower part of your digestive system. It is the second-leading cause of cancer deaths in the [place]. It often starts with small growths called polyps in the colon or rectum. Polyps are usually found with screening tests. Depending on the type of test, any polyps found may be removed during the tests. Colorectal cancer usually does not cause symptoms at first. But regular tests can help find it early, before it spreads and becomes harder to treat. Your risk for colorectal cancer gets higher as you get older. Some experts say that adults should start regular screening at age 48 and stop at age 76. Others say to start before age 48 or continue after age 76. Talk with your doctor about your risk and when to start and stop screening. You may have one of several tests. Follow-up care is a key part of your treatment and safety. Be sure to make and go to all appointments, and call your doctor if you are having problems. It's also a good idea to know your test results and keep a list of the medicines you take. What are the main screening tests for colon cancer? The screening tests are:  Stool tests. These include the guaiac fecal occult blood test (gFOBT), the fecal immunochemical test (FIT), and the combined fecal immunochemical test and stool DNA test (FIT-DNA). These tests check stool samples for signs of cancer. If your test is positive, you will need to have a colonoscopy. Sigmoidoscopy. This test lets your doctor look at the lining of your rectum and the lowest part of your colon. Your doctor uses a lighted tube called a sigmoidoscope. This test can't find cancers or polyps in the upper part of your colon.  In some cases, polyps that are found can be removed. But if your doctor finds polyps, you will need to have a colonoscopy to check the upper part of your colon. Colonoscopy. This test lets your doctor look at the lining of your rectum and your entire colon. The doctor uses a thin, flexible tool called a colonoscope. It can also be used to remove polyps or get a tissue sample (biopsy). A less common test is CT colonography (CTC). It's also called virtual colonoscopy. Who should be screened for colorectal cancer? Your risk for colorectal cancer gets higher as you get older. Some experts say that adults should start regular screening at age 48 and stop at age 76. Others say to start before age 48 or continue after age 76. Talk with your doctor about your risk and when to start and stop screening. How often you need screening depends on the type of test you get:  Stool tests. Every 1 or 2 years for FIT or gFOBT. Every 3 years for sDNA, also called FIT-DNA. Tests that look inside the colon. Every 5 or 10 years for sigmoidoscopy. Every 5 years for CT colonography (virtual colonoscopy). Every 10 years for colonoscopy. Experts agree that people at higher risk may need to be tested sooner. This includes people who have a strong family history of colon cancer. Talk to your doctor about which test is best for you and when to be tested. When should you call for help? Watch closely for changes in your health, and be sure to contact your doctor if:    · You have any changes in your bowel habits.     · You have any problems. Where can you learn more? Go to https://[email or web address] [email or web address]..Ditech [email or web address] org and sign in to your EpicTopic account. Enter [phone] in the [facility] box to learn more about \"Colon Cancer Screening: Care Instructions. \"     If you do not have an account, please click on the \"Sign Up Now\" link. Current as of: December 17, 2020               Content Version: 12.8  © 0845-1426 Healthwise, [facility].    Care

## 2021-05-19 NOTE — PROGRESS NOTES
Subjective:      Patient ID: Lesa Osborn is a 63 y.o. female who presents today for:  Chief Complaint   Patient presents with    Rash     Patient c/o rash under right armpit x almost a week that burns and has some pain and tingly. Pt also notesa small patchy rash noted to her rt thigh       Rash  This is a new problem. The current episode started in the past 7 days. The problem has been gradually worsening since onset. Location: under pt's rt armpit and on her rt part of back. The rash is characterized by burning, pain and redness (pt reports a 3/10 pain ). She was exposed to nothing. Pertinent negatives include no congestion, cough, diarrhea, fatigue, fever, rhinorrhea, shortness of breath, sore throat or vomiting. Treatments tried: diaper rash cream. The treatment provided no relief. There is no history of allergies, eczema or varicella.        Past Medical History:   Diagnosis Date    Anxiety state, unspecified     Headache(784.0)     Lumbago     surgery dr Jacob Tuttle Mixed hyperlipidemia     meds 2 months    Non morbid obesity due to excess calories 2/13/2016    Primary localized osteoarthrosis, lower leg     Type II diabetes mellitus, uncontrolled ([facility] [address]     dx 1 yr     Past Surgical History:   Procedure Laterality Date    APPENDECTOMY  1977    DILATION AND CURETTAGE OF UTERUS      ENDOMETRIAL ABLATION      LAMINECTOMY Right 3/27/2017    RIGHT L5-S1 MICRODISKECTOMY, 1 HR  [facility] at [facility] ARTHROSCOPY Right     [facility]    TUBAL LIGATION  1999     Social History     Socioeconomic History    Marital status:      Spouse name: Not on file    Number of children: Not on file    Years of education: Not on file    Highest education level: Not on file   Occupational History    Not on file   Tobacco Use    Smoking status: Current Every Day Smoker     Packs/day: 1.00     Years: 38.00     Pack years: 38.00     Types: Cigarettes    Smokeless tobacco: Never Used   Murrel Neither Tobacco comment: pt trying to cut back   Substance and Sexual Activity    Alcohol use: Yes     Comment: rare holidays    Drug use: No    Sexual activity: Not on file   Other Topics Concern    Not on file   Social History Narrative    Not on file     Social Determinants of Health     Financial Resource Strain: Low Risk     Difficulty of Paying Living Expenses: Not hard at all   Food Insecurity: No Food Insecurity    Worried About [address] in the Last Year: Never true    [address] in the Last Year: Never true   Transportation Needs: No Transportation Needs    Lack of Transportation (Medical): No    Lack of Transportation (Non-Medical): No   Physical Activity:     Days of Exercise per Week:     Minutes of Exercise per Session:    Stress:     Feeling of Stress :    Social Connections:     Frequency of Communication with Friends and Family:     Frequency of Social Gatherings with Friends and Family:     Attends Orthodoxy Services:     Active Member of Clubs or Organizations:     Attends Club or Organization Meetings:     Marital Status:    Intimate Partner Violence:     Fear of Current or Ex-Partner:     Emotionally Abused:     Physically Abused:     Sexually Abused:      Family History   Problem Relation Age of Onset    Breast Cancer Mother     Uterine Cancer Mother     Diabetes Paternal Grandmother     Uterine Cancer Other     High Cholesterol Other      Allergies   Allergen Reactions    Metformin And Related      Diarrhea          Review of Systems   Constitutional: Negative for activity change, appetite change, fatigue and fever. HENT: Negative for congestion, drooling, ear pain, hearing loss, postnasal drip, rhinorrhea, sinus pain, sore throat and trouble swallowing. Eyes: Negative for redness, itching and visual disturbance. Respiratory: Negative for apnea, cough, chest tightness, shortness of breath and wheezing.     Cardiovascular: Negative for chest pain and palpitations. Gastrointestinal: Negative for abdominal distention, constipation, diarrhea, nausea and vomiting. Endocrine: Negative for heat intolerance. Genitourinary: Negative for difficulty urinating and urgency. Musculoskeletal: Negative for gait problem, myalgias and neck stiffness. Skin: Positive for rash (pt also has a small patchy red rash noted to her rt thigh and the inner area that \"is itchy\"). Pt has an erythematous vesicular lesion rash noted under her rt armpit that is has some lesions that appeared to be scabbing but pt reports the area under her arm is tingly, painful and \"feels weird\". Pt also has several vesicular lesion noted to her rt upper back . Neurological: Negative for tremors, seizures, facial asymmetry and headaches. Hematological: Negative for adenopathy. Psychiatric/Behavioral: Negative for confusion. All other systems reviewed and are negative. Objective:   /80 (Site: Left Upper Arm, Position: Sitting, Cuff Size: Medium Adult)   Pulse 84   Resp 16   Ht 5' 5\" (1.651 m)   Wt 186 lb 3.2 oz (84.5 kg)   SpO2 96%   BMI 30.99 kg/m²     Physical Exam  Vitals and nursing note reviewed. Constitutional:       General: She is awake. She is not in acute distress. Appearance: Normal appearance. She is well-developed and well-groomed. She is obese. She is not ill-appearing or toxic-appearing. HENT:      Head: Normocephalic and atraumatic. Nose: Nose normal.   Eyes:      Pupils: Pupils are equal, round, and reactive to light. Cardiovascular:      Rate and Rhythm: Normal rate and regular rhythm. Pulses: Normal pulses. Heart sounds: Normal heart sounds. No murmur heard. Pulmonary:      Effort: Pulmonary effort is normal. No tachypnea, bradypnea or respiratory distress. Breath sounds: Normal breath sounds. No stridor. No decreased breath sounds or wheezing. Chest:      Chest wall: No tenderness.    Abdominal:      General: Bowel 5/19/2022    JESSE Digital Screen Bilateral [[id number]]     Standing Status:   Future     Standing Expiration Date:   5/19/2022     Order Specific Question:   Reason for exam:     Answer:   yearly exam, r/o breast ca     Orders Placed This Encounter   Medications    valACYclovir (VALTREX) 1 g tablet     Sig: Take 1 tablet by mouth 3 times daily for 7 days For Shingles     Dispense:  21 tablet     Refill:  0    triamcinolone (KENALOG) 0.1 % ointment     Sig: Apply topically 2 times daily for 7 days     Dispense:  1 Tube     Refill:  0     Pt here today with c/o a rash under her rt armpit that has been there almost a week and hurts and is tingly. Pt also has a rash to her inner rt thigh that is itchy but no pain noted. On exam pt is being treated for shingles today and also prescribed kenalog cream for her rash on her rt leg. PT verbalized understanding of the [place]. Pt left the [facility] today in stable condition. Discussed signs and symptoms which require immediate follow-up in ED/call to 911. Patient verbalized understanding. Return if symptoms worsen or fail to improve. Reviewed with the patient: current clinical status, medications, activities and diet. Side effects, adverse effects of the medication prescribed today, as well as treatment plan and result expectations have been discussed with the patient who expresses understanding and desires to proceed. Close follow up to evaluate treatment results and for coordination of care. I have reviewed the patient's medical history in detail and updated the computerized patient record.       Pasha Person, TANIKA - CNP

## 2021-05-28 ENCOUNTER — OFFICE VISIT (OUTPATIENT)
Dept: ENDOCRINOLOGY | Age: 56
End: 2021-05-28
Payer: COMMERCIAL

## 2021-05-28 VITALS
BODY MASS INDEX: 31.92 KG/M2 | WEIGHT: 187 LBS | HEIGHT: 64 IN | OXYGEN SATURATION: 94 % | DIASTOLIC BLOOD PRESSURE: 78 MMHG | SYSTOLIC BLOOD PRESSURE: 117 MMHG | HEART RATE: 83 BPM

## 2021-05-28 DIAGNOSIS — E78.2 MIXED HYPERLIPIDEMIA: ICD-10-CM

## 2021-05-28 DIAGNOSIS — E11.9 TYPE 2 DIABETES MELLITUS WITHOUT COMPLICATION, UNSPECIFIED WHETHER LONG TERM INSULIN USE (HCC): Primary | ICD-10-CM

## 2021-05-28 LAB
CHP ED QC CHECK: NORMAL
GLUCOSE BLD-MCNC: 181 MG/DL

## 2021-05-28 PROCEDURE — 3052F HG A1C>EQUAL 8.0%<EQUAL 9.0%: CPT | Performed by: INTERNAL MEDICINE

## 2021-05-28 PROCEDURE — 2022F DILAT RTA XM EVC RTNOPTHY: CPT | Performed by: INTERNAL MEDICINE

## 2021-05-28 PROCEDURE — 4004F PT TOBACCO SCREEN RCVD TLK: CPT | Performed by: INTERNAL MEDICINE

## 2021-05-28 PROCEDURE — G8427 DOCREV CUR MEDS BY ELIG CLIN: HCPCS | Performed by: INTERNAL MEDICINE

## 2021-05-28 PROCEDURE — 3017F COLORECTAL CA SCREEN DOC REV: CPT | Performed by: INTERNAL MEDICINE

## 2021-05-28 PROCEDURE — 82962 GLUCOSE BLOOD TEST: CPT | Performed by: INTERNAL MEDICINE

## 2021-05-28 PROCEDURE — 99213 OFFICE O/P EST LOW 20 MIN: CPT | Performed by: INTERNAL MEDICINE

## 2021-05-28 PROCEDURE — G8417 CALC BMI ABV UP PARAM F/U: HCPCS | Performed by: INTERNAL MEDICINE

## 2021-05-28 RX ORDER — IBUPROFEN 800 MG/1
TABLET ORAL
Qty: 120 TABLET | Refills: 5 | Status: SHIPPED | OUTPATIENT
Start: 2021-05-28 | End: 2021-06-15 | Stop reason: SDUPTHER

## 2021-05-28 RX ORDER — CANAGLIFLOZIN 300 MG/1
300 TABLET, FILM COATED ORAL
Qty: 90 TABLET | Refills: 3 | Status: SHIPPED | OUTPATIENT
Start: 2021-05-28 | End: 2021-05-28 | Stop reason: SDUPTHER

## 2021-05-28 RX ORDER — TRAZODONE HYDROCHLORIDE 150 MG/1
150 TABLET ORAL NIGHTLY
Qty: 30 TABLET | Refills: 0 | Status: SHIPPED | OUTPATIENT
Start: 2021-05-28 | End: 2021-06-17 | Stop reason: SDUPTHER

## 2021-05-28 RX ORDER — ATORVASTATIN CALCIUM 20 MG/1
TABLET, FILM COATED ORAL
Qty: 90 TABLET | Refills: 3 | Status: SHIPPED | OUTPATIENT
Start: 2021-05-28 | End: 2021-07-27 | Stop reason: SDUPTHER

## 2021-05-28 RX ORDER — CANAGLIFLOZIN 300 MG/1
300 TABLET, FILM COATED ORAL
Qty: 90 TABLET | Refills: 3 | Status: SHIPPED | OUTPATIENT
Start: 2021-05-28 | End: 2021-12-03 | Stop reason: ALTCHOICE

## 2021-05-28 RX ORDER — CANAGLIFLOZIN 300 MG/1
TABLET, FILM COATED ORAL
Qty: 30 TABLET | Refills: 0 | Status: CANCELLED | OUTPATIENT
Start: 2021-05-28

## 2021-05-28 NOTE — PROGRESS NOTES
5/28/2021    Assessment:       Diagnosis Orders   1.  Type 2 diabetes mellitus without complication, unspecified whether long term insulin use (HCC)  POCT Glucose    HM DIABETES FOOT EXAM    Hemoglobin N5R    Basic Metabolic Panel, Fasting    Microalbumin / Creatinine Urine Ratio         PLAN:     Orders Placed This Encounter   Procedures    Hemoglobin A1C     Standing Status:   Future     Standing Expiration Date:   5/28/2022    Basic Metabolic Panel, Fasting     Standing Status:   Future     Standing Expiration Date:   5/28/2022    Microalbumin / Creatinine Urine Ratio     Standing Status:   Future     Standing Expiration Date:   5/28/2022    POCT Glucose    HM DIABETES FOOT EXAM     Orders Placed This Encounter   Medications    traZODone (DESYREL) 150 MG tablet     Sig: Take 1 tablet by mouth nightly     Dispense:  30 tablet     Refill:  0    DISCONTD: SITagliptin (JANUVIA) 100 MG tablet     Sig: One daily     Dispense:  90 tablet     Refill:  1    DISCONTD: canagliflozin (INVOKANA) 300 MG TABS tablet     Sig: Take 1 tablet by mouth every morning (before breakfast)     Dispense:  90 tablet     Refill:  3    canagliflozin (INVOKANA) 300 MG TABS tablet     Sig: Take 1 tablet by mouth every morning (before breakfast)     Dispense:  90 tablet     Refill:  3    SITagliptin (JANUVIA) 100 MG tablet     Sig: One daily     Dispense:  90 tablet     Refill:  1    atorvastatin (LIPITOR) 20 MG tablet     Sig: TAKE 1 TABLET DAILY     Dispense:  90 tablet     Refill:  3    ibuprofen (ADVIL;MOTRIN) 800 MG tablet     Sig: TK 1 T PO  BID PRN P     Dispense:  120 tablet     Refill:  5     Continue current medication regimen repeat labs Methimazole follow-up    Orders Placed This Encounter   Procedures    POCT Glucose       Subjective:     Chief Complaint   Patient presents with    Diabetes     Vitals:    05/28/21 1603   BP: 117/78   Pulse: 83   SpO2: 94%   Weight: 187 lb (84.8 kg)   Height: 5' 4\" (1.626 m)     Wt Readings from Last 3 Encounters:   05/28/21 187 lb (84.8 kg)   05/19/21 186 lb 3.2 oz (84.5 kg)   02/27/20 187 lb (84.8 kg)     BP Readings from Last 3 Encounters:   05/28/21 117/78   05/19/21 126/80   02/27/20 126/80     Follow-up type 2 diabetes hypercholesterolemia arthritis patient requesting refills had labs done A1c was 8.6 currently on Januvia plus Invokana also requesting well Lipitor trazodone for sleep  Has not had Covid vaccine recommended to get the vaccine    Diabetes  She presents for her follow-up diabetic visit. Her disease course has been stable. Associated symptoms include fatigue. Risk factors for coronary artery disease include obesity. Current diabetic treatment includes oral agent (dual therapy). Her overall blood glucose range is >200 mg/dl. (Lab Results       Component                Value               Date                       LABA1C                   8.6 (H)             05/19/2021            )        Results for Mickie Lopez (MRN [id number]) as of 5/28/2021 16:09   Ref.  Range 5/19/2021 19:27 5/19/2021 19:34 5/28/2021 16:03   Sodium Latest Ref Range: 135 - 144 mEq/L  138    Potassium Latest Ref Range: 3.4 - 4.9 mEq/L  4.3    Chloride Latest Ref Range: 95 - 107 mEq/L  102    CO2 Latest Ref Range: 20 - 31 mEq/L  23    BUN Latest Ref Range: 6 - 20 mg/dL  16    Creatinine Latest Ref Range: 0.50 - 0.90 mg/dL  0.56    Anion Gap Latest Ref Range: 9 - 15 mEq/L  13    GFR Non- Latest Ref Range: >60   >60.0    GFR African American Latest Ref Range: >60   >60.0    Glucose Latest Units: mg/dL  126 (H) 181   Calcium Latest Ref Range: 8.5 - 9.9 mg/dL  10.0 (H)    Cholesterol, Total Latest Ref Range: 0 - 199 mg/dL  193    HDL Cholesterol Latest Ref Range: 40 - 59 mg/dL  36 (L)    LDL Calculated Latest Ref Range: 0 - 129 mg/dL  118    Triglycerides Latest Ref Range: 0 - 150 mg/dL  195 (H)    Hemoglobin A1C Latest Ref Range: 4.8 - 5.9 % 8.6 (H)           Past Medical History:   Diagnosis Date    Anxiety state, unspecified     Headache(784.0)     Lumbago     surgery dr Brandon Kamara Mixed hyperlipidemia     meds 2 months    Non morbid obesity due to excess calories 2/13/2016    Primary localized osteoarthrosis, lower leg     Type II diabetes mellitus, uncontrolled ([facility] [address]     dx 1 yr     Past Surgical History:   Procedure Laterality Date    APPENDECTOMY  1977    DILATION AND CURETTAGE OF UTERUS      ENDOMETRIAL ABLATION      LAMINECTOMY Right 3/27/2017    RIGHT L5-S1 MICRODISKECTOMY, 1 HR  performed by Meghana Sharp MD at [address] [facility] ARTHROSCOPY Right     RCR    TUBAL LIGATION  1999     Social History     Socioeconomic History    Marital status:      Spouse name: Not on file    Number of children: Not on file    Years of education: Not on file    Highest education level: Not on file   Occupational History    Not on file   Tobacco Use    Smoking status: Current Every Day Smoker     Packs/day: 1.00     Years: 38.00     Pack years: 38.00     Types: Cigarettes    Smokeless tobacco: Never Used    Tobacco comment: pt trying to cut back   Substance and Sexual Activity    Alcohol use: Yes     Comment: rare holidays    Drug use: No    Sexual activity: Not on file   Other Topics Concern    Not on file   Social History Narrative    Not on file     Social Determinants of Health     Financial Resource Strain: Low Risk     Difficulty of Paying Living Expenses: Not hard at all   Food Insecurity: No Food Insecurity    Worried About Running Out of Food in the Last Year: Never true    Zeb of Food in the Last Year: Never true   Transportation Needs: No Transportation Needs    Lack of Transportation (Medical): No    Lack of Transportation (Non-Medical):  No   Physical Activity:     Days of Exercise per Week:     Minutes of Exercise per Session:    Stress:     Feeling of Stress :    Social Connections:     Frequency of Communication with Friends and Family:     Frequency of Component Value Date     05/19/2021    K 4.3 05/19/2021     05/19/2021    CO2 23 05/19/2021    BUN 16 05/19/2021    CREATININE 0.56 05/19/2021    GLUCOSE 181 05/28/2021    CALCIUM 10.0 (H) 05/19/2021    PROT 7.6 04/16/2019    LABALBU 4.6 04/16/2019    BILITOT <0.2 04/16/2019    ALKPHOS 97 04/16/2019    AST 16 04/16/2019    ALT 16 04/16/2019    LABGLOM >60.0 05/19/2021    GFRAA >60.0 05/19/2021    GLOB 3.0 04/16/2019     Lab Results   Component Value Date    WBC 10.1 04/16/2019    HGB 15.6 04/16/2019    HCT 45.9 04/16/2019    MCV 90.0 04/16/2019     04/16/2019     Lab Results   Component Value Date    LABA1C 8.6 (H) 05/19/2021    LABA1C 8.2 (H) 02/22/2020    LABA1C 8.4 03/23/2019     Lab Results   Component Value Date    HDL 36 (L) 05/19/2021    HDL 35 (L) 01/15/2019    HDL 34 (L) 04/20/2018    LDLCALC 118 05/19/2021    LDLCALC 110 01/15/2019    LDLCALC 120 04/20/2018    CHOL 193 05/19/2021    CHOL 212 (H) 01/15/2019    CHOL 189 04/20/2018    TRIG 195 (H) 05/19/2021    TRIG 334 (H) 01/15/2019    TRIG 175 04/20/2018       Lab Results   Component Value Date    TSH 1.810 06/24/2015    TSH 1.220 12/08/2013       Review of Systems   Constitutional: Positive for fatigue. Cardiovascular: Negative. Endocrine: Negative. Musculoskeletal: Positive for arthralgias. Psychiatric/Behavioral: Positive for sleep disturbance. All other systems reviewed and are negative. Objective:   Physical Exam  Vitals reviewed. Constitutional:       Appearance: Normal appearance. She is obese. HENT:      Head: Normocephalic and atraumatic. Hair is normal.      Right Ear: External ear normal.      Left Ear: External ear normal.      Nose: Nose normal.   Eyes:      General: No scleral icterus. Right eye: No discharge. Left eye: No discharge. Extraocular Movements: Extraocular movements intact.       Conjunctiva/sclera: Conjunctivae normal.   Neck:      Trachea: Trachea normal. Cardiovascular:      Rate and Rhythm: Normal rate. Pulmonary:      Effort: Pulmonary effort is normal.   Musculoskeletal:         General: Normal range of motion. Cervical back: Normal range of motion and neck supple. Legs:    Neurological:      General: No focal deficit present. Mental Status: She is alert and oriented to person, place, and time.    Psychiatric:         Mood and Affect: Mood normal.         Behavior: Behavior normal.

## 2021-06-15 RX ORDER — IBUPROFEN 800 MG/1
TABLET ORAL
Qty: 120 TABLET | Refills: 5 | Status: SHIPPED | OUTPATIENT
Start: 2021-06-15

## 2021-06-16 ENCOUNTER — TELEPHONE (OUTPATIENT)
Dept: ENDOCRINOLOGY | Age: 56
End: 2021-06-16

## 2021-06-16 NOTE — TELEPHONE ENCOUNTER
Patient is calling to check on her prior authorization for the [facility]. Please check on this for the patient and call and let her know the status. Thanks!

## 2021-06-17 RX ORDER — CANAGLIFLOZIN 300 MG/1
300 TABLET, FILM COATED ORAL
Qty: 90 TABLET | Refills: 3 | OUTPATIENT
Start: 2021-06-17

## 2021-06-17 RX ORDER — TRAZODONE HYDROCHLORIDE 150 MG/1
150 TABLET ORAL NIGHTLY
Qty: 30 TABLET | Refills: 0 | Status: SHIPPED | OUTPATIENT
Start: 2021-06-17 | End: 2021-07-23

## 2021-06-17 NOTE — TELEPHONE ENCOUNTER
Called [employer] and medication was approved. Spoke to patient about her medication being approved.

## 2021-06-17 NOTE — TELEPHONE ENCOUNTER
Patient requesting medication refill.  Please approve or deny this request.    Rx requested:  Requested Prescriptions     Pending Prescriptions Disp Refills    traZODone (DESYREL) 150 MG tablet 30 tablet 0     Sig: Take 1 tablet by mouth nightly         Last Office Visit:   5/28/2021      Next Visit Date:  Future Appointments   Date Time Provider Ramy Muñoz   12/3/2021  3:30 PM Quinton Smallwood  [address] [facility] [address]

## 2021-07-23 RX ORDER — TRAZODONE HYDROCHLORIDE 150 MG/1
TABLET ORAL
Qty: 30 TABLET | Refills: 11 | Status: SHIPPED | OUTPATIENT
Start: 2021-07-23 | End: 2021-07-27 | Stop reason: SDUPTHER

## 2021-07-27 DIAGNOSIS — E78.2 MIXED HYPERLIPIDEMIA: ICD-10-CM

## 2021-07-28 RX ORDER — ATORVASTATIN CALCIUM 20 MG/1
TABLET, FILM COATED ORAL
Qty: 90 TABLET | Refills: 3 | Status: SHIPPED | OUTPATIENT
Start: 2021-07-28 | End: 2022-07-22

## 2021-07-28 RX ORDER — TRAZODONE HYDROCHLORIDE 150 MG/1
150 TABLET ORAL NIGHTLY
Qty: 30 TABLET | Refills: 11 | Status: SHIPPED | OUTPATIENT
Start: 2021-07-28

## 2021-08-13 ENCOUNTER — TELEPHONE (OUTPATIENT)
Dept: ENDOCRINOLOGY | Age: 56
End: 2021-08-13

## 2021-08-25 ENCOUNTER — VIRTUAL VISIT (OUTPATIENT)
Dept: PRIMARY CARE CLINIC | Age: 56
End: 2021-08-25

## 2021-08-25 DIAGNOSIS — R52 BODY ACHES: ICD-10-CM

## 2021-08-25 DIAGNOSIS — J06.9 URI WITH COUGH AND CONGESTION: Primary | ICD-10-CM

## 2021-08-25 PROCEDURE — 99441 PR PHYS/QHP TELEPHONE EVALUATION 5-10 MIN: CPT | Performed by: NURSE PRACTITIONER

## 2021-08-25 RX ORDER — AZITHROMYCIN 250 MG/1
TABLET, FILM COATED ORAL
Qty: 6 TABLET | Refills: 0 | Status: SHIPPED | OUTPATIENT
Start: 2021-08-25 | End: 2021-08-30

## 2021-08-25 ASSESSMENT — ENCOUNTER SYMPTOMS
ABDOMINAL PAIN: 0
CHEST TIGHTNESS: 1
NAUSEA: 0
VOMITING: 0
COUGH: 1
DIARRHEA: 1

## 2021-08-25 NOTE — PATIENT INSTRUCTIONS
We'll call with your COVID-19 results  Results will also be available on [facility] account, if activated    Things to Know Regarding COVID-19:   - Do not leave home except to receive medical care while waiting for your results  - Testing does not change treatment- there is no medication that treats COVID-19  - Stay well-hydrated and get plenty of rest   - You may take over the counter medicine such as acetaminophen (Tylenol) for pain or fever if you're not allergic or intolerant to it- follow dose directions on the label  - Monitor your symptoms + contact a medical provider if symptoms are worsening- such as difficulty breathing  - Follow social distancing guidelines and wear a face mask when leaving home is necessary  - Symptoms may develop 2 days to 2 weeks following an exposure to the virus-> if you test negative early after a possible exposure, you might still test positive later during the 14-day window. Seek medical care immediately (ER/ call 911) if you have trouble breathing, persistent pain or pressure in the chest, new confusion, inability to wake or stay awake, bluish lips or face, or any other signs/ symptoms that you think could be an emergency. Patient Education        10 Things to Do When You Have COVID-19    Stay home. Don't go to school, work, or public areas. And don't use public transportation, ride-shares, or taxis unless you have no choice. Leave your home only if you need to get medical care. But call the doctor's office first so they know you're coming. And wear a cloth face cover. Ask before leaving isolation. Talk with your doctor or other health professional about when it will be safe for you to leave isolation. Wear a cloth face cover when you are around other people. It can help stop the spread of the virus when you cough or sneeze. Limit contact with people in your home. If possible, stay in a separate bedroom and use a separate bathroom.      Avoid contact with pets and other animals. If possible, have a friend or family member care for them while you're sick. Cover your mouth and nose with a tissue when you cough or sneeze. Then throw the tissue in the trash right away. Wash your hands often, especially after you cough or sneeze. Use soap and water, and scrub for at least 20 seconds. If soap and water aren't available, use an alcohol-based hand . Don't share personal household items. These include bedding, towels, cups and glasses, and eating utensils. Clean and disinfect your home every day. Use household  or disinfectant wipes or sprays. Take special care to clean things that you grab with your hands. These include doorknobs, remote controls, phones, and handles on your refrigerator and microwave. And don't forget countertops, tabletops, bathrooms, and computer keyboards. Take acetaminophen (Tylenol) to relieve fever and body aches. Read and follow all instructions on the label. Current as of: March 26, 2021               Content Version: 12.9  © 2006-2021 [employer]. Care instructions adapted under license by [facility] ([facility]). If you have questions about a medical condition or this instruction, always ask your healthcare professional. [place] [address] any warranty or liability for your use of this information.

## 2021-08-25 NOTE — PROGRESS NOTES
TELEHEALTH VISIT -- Audio Only     SUBJECTIVE:    Jah Lagos is a 61 y.o. female evaluated via telephone on 8/25/2021. Consent:  Jae Farr is aware that she may receive a bill for this telephone service, depending on her insurance coverage, and has provided verbal consent to proceed: Yes    Documentation:  I communicated with the patient and/or health care decision maker about:  Chief Complaint   Patient presents with    Cough     Per work pt needs COVID test    Generalized Body Aches    Laryngitis     History of Present Illness:  Jae Farr is currently: Suspected of COVID-19. 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 Fully vaccinated as of 06/2021. Pesenting symptoms: body aches, fatigue, cough, chest tightness, HA, sore throat, congestion, postnasal drainage, diarrhea. Symptoms began: 8/23. Max temperature in last 24 hrs: 98.1 F (infrared). Patient denies: fever, chills, shortness of breath at rest, sputum production, nausea/ vomiting, abdominal pain, loss of smell, loss of taste. and loss of taste. Treatment to date: OTC Zicam.    Exposure source: unknown. no household contacts with similar symptoms- lives with her  and son lives above them- neither are vaccinated. denies any sick contacts at work. Relevant PMH: T2DM. Smoking history: patient  reports that she has been smoking cigarettes. She has a 38.00 pack-year smoking history. She has never used smokeless tobacco.   No recent travel. Review of Systems   Constitutional: Positive for fatigue. Negative for chills and fever. HENT: Positive for congestion and postnasal drip. Respiratory: Positive for cough and chest tightness. Cardiovascular: Negative for chest pain and palpitations. Gastrointestinal: Positive for diarrhea. Negative for abdominal pain, nausea and vomiting. Musculoskeletal: Positive for myalgias. Neurological: Positive for headaches.  Negative for dizziness and light-headedness. Past Medical History:   Diagnosis Date    Anxiety state, unspecified     Headache(784.0)     Lumbago     surgery dr Quincy Lr Mixed hyperlipidemia     meds 2 months    Non morbid obesity due to excess calories 2/13/2016    Primary localized osteoarthrosis, lower leg     Type II diabetes mellitus, uncontrolled ([facility] [address]     dx 1 yr     Past Surgical History:   Procedure Laterality Date    APPENDECTOMY  1977    DILATION AND CURETTAGE OF UTERUS      ENDOMETRIAL ABLATION      LAMINECTOMY Right 3/27/2017    RIGHT L5-S1 MICRODISKECTOMY, 1 HR  performed by Genny Jones MD at [address] [facility] ARTHROSCOPY Right     RCR    TUBAL LIGATION  1999     Previous Medications    ALCOHOL SWABS 70 % PADS    USE TWICE DAILY    ATORVASTATIN (LIPITOR) 20 MG TABLET    TAKE 1 TABLET DAILY    BLOOD GLUCOSE MONITORING SUPPL (FREESTYLE LITE) ALVARO    1 Device by Does not apply route daily as needed    BLOOD GLUCOSE TEST STRIPS (FREESTYLE LITE) STRIP    Pt test 3x daily dx E11.65    CANAGLIFLOZIN (INVOKANA) 300 MG TABS TABLET    Lot [id number] Exp 07/2021 and Lot [id number] Exp 12/2019 given by Ricky Corona on 04/17/2019    CANAGLIFLOZIN (INVOKANA) 300 MG TABS TABLET    Take 1 tablet by mouth every morning (before breakfast)    FREESTYLE LANCETS MISC    Test 3x daily dx E11.65    GLIMEPIRIDE (AMARYL) 2 MG TABLET    TAKE 1 TABLET TWICE A DAY    IBUPROFEN (ADVIL;MOTRIN) 800 MG TABLET    TAKE 1 TABLET BY MOUTH TWO TIMES A DAY AS NEEDED    IBUPROFEN (ADVIL;MOTRIN) 800 MG TABLET    TK 1 T PO  BID PRN P    INSULIN PEN NEEDLE 31G X 6 MM MISC    Use 1 daily    ONDANSETRON (ZOFRAN ODT) 4 MG DISINTEGRATING TABLET    Take 1-2 tablets by mouth every 12 hours as needed for Nausea May Sub regular tablet (non-ODT) if insurance does not cover ODT.     SITAGLIPTIN (JANUVIA) 100 MG TABLET    One daily    TRAZODONE (DESYREL) 150 MG TABLET    Take 1 tablet by mouth nightly     OBJECTIVE:     Vital Signs: (As obtained by: pt or caregiver)  Patient-Reported Vitals 8/25/2021   Patient-Reported Weight 186 lb   Patient-Reported Height 5ft 4 in   Patient-Reported Temperature 98.1 F infrared      Exam: N/A  (telehealth audio visit)     TELEHEALTH ASSESSMENT & PLAN:   Details of this discussion including any medical advice provided:     61 y.o. female, current smoker, with cough, congestion, body aches. R/O COVID-19.     1. URI with cough and congestion  Comments:  r/o COVID-19. sample collected by MA and submitted. discussed self-quarnatine except for medical care pending results, supportive care, return precautions. Orders:  -     azithromycin (ZITHROMAX) 250 MG tablet; 500 mg on day 1, then 250 mg days 2-5., Disp-6 tablet, R-0Normal  2. Body aches  Comments:  r/o COVID-19    -      COVID-19 sample collected by MA and submitted  - Antibiotic Rx provided with nstructions to begin pending results/ sx progression  - OTC medications to use and avoid were discussed  - Self-monitor symptoms + contact a medical provider if worsening, such as difficulty breathing    Antibiotic Instructions: Complete the full course of antibiotics as ordered. Take each dose with a small snack or meal to lessen potential GI upset. To prevent antibiotic resistance, please take medication as ordered and for the full duration even if you start to feel better. Consider intake of yogurt or probiotic during antibiotic use and for a few days after to help reduce the risk of developing a secondary infection. Separate the yogurt and antibiotic by at least 1 hour. Avoid alcohol while taking antibiotics. Return for follow-up in 2 to 3 days if not improving, otherwise as needed. I affirm this is a Patient Initiated Episode with an Established Patient who has not had a related appointment within my department in the past 7 days or scheduled within the next 24 hours.     Total Time: minutes: 5-10 minutes    TELEHEALTH EVALUATION -- Audio/Telephone (During COVID-19 public health emergency)  This service was provided through telehealth, by TANIKA Granados CNP and the patient in his/her home via telephone call. 10 minutes were spent on the phone with patient. Provider performed history of present illness and review of systems. Diagnosis and treatment plan was discussed with patient. Pharmacy of choice was reviewed along with past medical history, medication allergies, and current medications. Education provided to patient or patient parents/guardian with current illness diagnosis as well as when to seek additional healthcare due to changing or for worsening symptoms. Patient voiced understanding.      TANIKA Granados CNP

## 2021-08-25 NOTE — LETTER
[facility]  [address] [facility] [address]  Phone: [phone]  Fax: [address], APRN - CNP    August 25, 2021     Patient: Nicole Pruitt   Date of Visit: 8/25/2021       To Whom it May Concern:    Nicole Pruitt was evaluated during a virtual visit and tested today in the clinic. It is my medical opinion that Ms. Garcia should not return to work until COVID-19 test results are back. We expect results in 1 to 3 days. If there are questions or concerns, please have the patient contact our office. Thank you for your assistance in this matter.           Sincerely,          Electronically signed by TANIKA Geronimo CNP on 8/25/2021 at 1:58 PM negative...

## 2021-12-03 ENCOUNTER — OFFICE VISIT (OUTPATIENT)
Dept: ENDOCRINOLOGY | Age: 56
End: 2021-12-03
Payer: COMMERCIAL

## 2021-12-03 VITALS
DIASTOLIC BLOOD PRESSURE: 58 MMHG | HEIGHT: 64 IN | HEART RATE: 86 BPM | SYSTOLIC BLOOD PRESSURE: 114 MMHG | BODY MASS INDEX: 31.58 KG/M2 | WEIGHT: 185 LBS | OXYGEN SATURATION: 98 %

## 2021-12-03 DIAGNOSIS — E11.9 TYPE 2 DIABETES MELLITUS WITHOUT COMPLICATION, UNSPECIFIED WHETHER LONG TERM INSULIN USE (HCC): Primary | ICD-10-CM

## 2021-12-03 LAB
CHP ED QC CHECK: NORMAL
GLUCOSE BLD-MCNC: 135 MG/DL
HBA1C MFR BLD: 9.4 %

## 2021-12-03 PROCEDURE — G8484 FLU IMMUNIZE NO ADMIN: HCPCS | Performed by: INTERNAL MEDICINE

## 2021-12-03 PROCEDURE — 3046F HEMOGLOBIN A1C LEVEL >9.0%: CPT | Performed by: INTERNAL MEDICINE

## 2021-12-03 PROCEDURE — 2022F DILAT RTA XM EVC RTNOPTHY: CPT | Performed by: INTERNAL MEDICINE

## 2021-12-03 PROCEDURE — 4004F PT TOBACCO SCREEN RCVD TLK: CPT | Performed by: INTERNAL MEDICINE

## 2021-12-03 PROCEDURE — G8417 CALC BMI ABV UP PARAM F/U: HCPCS | Performed by: INTERNAL MEDICINE

## 2021-12-03 PROCEDURE — 3017F COLORECTAL CA SCREEN DOC REV: CPT | Performed by: INTERNAL MEDICINE

## 2021-12-03 PROCEDURE — 83036 HEMOGLOBIN GLYCOSYLATED A1C: CPT | Performed by: INTERNAL MEDICINE

## 2021-12-03 PROCEDURE — 99213 OFFICE O/P EST LOW 20 MIN: CPT | Performed by: INTERNAL MEDICINE

## 2021-12-03 PROCEDURE — G8427 DOCREV CUR MEDS BY ELIG CLIN: HCPCS | Performed by: INTERNAL MEDICINE

## 2021-12-03 PROCEDURE — 82962 GLUCOSE BLOOD TEST: CPT | Performed by: INTERNAL MEDICINE

## 2021-12-03 RX ORDER — DULAGLUTIDE 0.75 MG/.5ML
0.75 INJECTION, SOLUTION SUBCUTANEOUS WEEKLY
Qty: 4 PEN | Refills: 3 | Status: SHIPPED | OUTPATIENT
Start: 2021-12-03 | End: 2022-05-24 | Stop reason: SDUPTHER

## 2021-12-03 RX ORDER — FLUCONAZOLE 150 MG/1
150 TABLET ORAL ONCE
Qty: 1 TABLET | Refills: 1 | Status: SHIPPED | OUTPATIENT
Start: 2021-12-03 | End: 2021-12-03

## 2021-12-03 NOTE — PROGRESS NOTES
for coronary artery disease include obesity. Current diabetic treatments: Glimepiride Invokana. Her overall blood glucose range is >200 mg/dl.  (Lab Results       Component                Value               Date                       LABA1C                   9.4                 12/03/2021              )     Past Medical History:   Diagnosis Date    Anxiety state, unspecified     Headache(784.0)     Lumbago     surgery dr Duane Weaver Mixed hyperlipidemia     meds 2 months    Non morbid obesity due to excess calories 2/13/2016    Primary localized osteoarthrosis, lower leg     Type II diabetes mellitus, uncontrolled ([address]     dx 1 yr     Past Surgical History:   Procedure Laterality Date    APPENDECTOMY  1977    DILATION AND CURETTAGE OF UTERUS      ENDOMETRIAL ABLATION      LAMINECTOMY Right 3/27/2017    RIGHT L5-S1 MICRODISKECTOMY, 1 HR  performed by Kevan Bobo MD at [address] [facility] ARTHROSCOPY Right     RCR    TUBAL LIGATION  1999     Social History     Socioeconomic History    Marital status:      Spouse name: Not on file    Number of children: Not on file    Years of education: Not on file    Highest education level: Not on file   Occupational History    Not on file   Tobacco Use    Smoking status: Current Every Day Smoker     Packs/day: 1.00     Years: 38.00     Pack years: 38.00     Types: Cigarettes    Smokeless tobacco: Never Used    Tobacco comment: pt trying to cut back   Substance and Sexual Activity    Alcohol use: Yes     Comment: rare holidays    Drug use: No    Sexual activity: Not on file   Other Topics Concern    Not on file   Social History Narrative    Not on file     Social Determinants of Health     Financial Resource Strain: Low Risk     Difficulty of Paying Living Expenses: Not hard at all   Food Insecurity: No Food Insecurity    Worried About Running Out of Food in the Last Year: Never true    Zeb of Food in the Last Year: Never true   Transportation Needs: No Transportation Needs    Lack of Transportation (Medical): No    Lack of Transportation (Non-Medical):  No   Physical Activity:     Days of Exercise per Week: Not on file    Minutes of Exercise per Session: Not on file   Stress:     Feeling of Stress : Not on file   Social Connections:     Frequency of Communication with Friends and Family: Not on file    Frequency of Social Gatherings with Friends and Family: Not on file    Attends Gnosticism Services: Not on file    Active Member of [address] [place] [address] or Organizations: Not on file    Attends Club or Organization Meetings: Not on file    Marital Status: Not on file   Intimate Partner Violence:     Fear of Current or Ex-Partner: Not on file    Emotionally Abused: Not on file    Physically Abused: Not on file    Sexually Abused: Not on file   Housing Stability:     Unable to Pay for Housing in the Last Year: Not on file    Number of [place] in the Last Year: Not on file    Unstable Housing in the Last Year: Not on file     Family History   Problem Relation Age of Onset    Breast Cancer Mother     Uterine Cancer Mother     Diabetes Paternal Grandmother     Uterine Cancer Other     High Cholesterol Other      Allergies   Allergen Reactions    Metformin And Related      Diarrhea        Current Outpatient Medications:     atorvastatin (LIPITOR) 20 MG tablet, TAKE 1 TABLET DAILY, Disp: 90 tablet, Rfl: 3    traZODone (DESYREL) 150 MG tablet, Take 1 tablet by mouth nightly, Disp: 30 tablet, Rfl: 11    SITagliptin (JANUVIA) 100 MG tablet, One daily (Patient not taking: Reported on 8/25/2021), Disp: 90 tablet, Rfl: 1    ibuprofen (ADVIL;MOTRIN) 800 MG tablet, TK 1 T PO  BID PRN P, Disp: 120 tablet, Rfl: 5    canagliflozin (INVOKANA) 300 MG TABS tablet, Take 1 tablet by mouth every morning (before breakfast), Disp: 90 tablet, Rfl: 3    ibuprofen (ADVIL;MOTRIN) 800 MG tablet, TAKE 1 TABLET BY MOUTH TWO TIMES A DAY AS NEEDED (Patient not taking: 35 (L) 01/15/2019    HDL 34 (L) 04/20/2018    LDLCALC 118 05/19/2021    LDLCALC 110 01/15/2019    LDLCALC 120 04/20/2018    CHOL 193 05/19/2021    CHOL 212 (H) 01/15/2019    CHOL 189 04/20/2018    TRIG 195 (H) 05/19/2021    TRIG 334 (H) 01/15/2019    TRIG 175 04/20/2018     No results found for: TESTM  Lab Results   Component Value Date    TSH 1.810 06/24/2015    TSH 1.220 12/08/2013       Review of Systems   Endocrine: Negative. Genitourinary: Positive for vaginal discharge. All other systems reviewed and are negative. Objective:   Physical Exam  Vitals reviewed. Constitutional:       Appearance: Normal appearance. She is obese. HENT:      Head: Normocephalic and atraumatic. Hair is normal.      Right Ear: External ear normal.      Left Ear: External ear normal.      Nose: Nose normal.   Eyes:      General: No scleral icterus. Right eye: No discharge. Left eye: No discharge. Extraocular Movements: Extraocular movements intact. Conjunctiva/sclera: Conjunctivae normal.   Neck:      Trachea: Trachea normal.   Cardiovascular:      Rate and Rhythm: Normal rate. Pulmonary:      Effort: Pulmonary effort is normal.   Musculoskeletal:         General: Normal range of motion. Cervical back: Normal range of motion and neck supple. Neurological:      General: No focal deficit present. Mental Status: She is alert and oriented to person, place, and time.    Psychiatric:         Mood and Affect: Mood normal.         Behavior: Behavior normal.

## 2022-03-31 ENCOUNTER — TELEPHONE (OUTPATIENT)
Dept: PRIMARY CARE CLINIC | Age: 57
End: 2022-03-31

## 2022-03-31 ENCOUNTER — OFFICE VISIT (OUTPATIENT)
Dept: FAMILY MEDICINE CLINIC | Age: 57
End: 2022-03-31
Payer: COMMERCIAL

## 2022-03-31 VITALS
OXYGEN SATURATION: 98 % | TEMPERATURE: 95.9 F | SYSTOLIC BLOOD PRESSURE: 120 MMHG | HEART RATE: 96 BPM | DIASTOLIC BLOOD PRESSURE: 80 MMHG | WEIGHT: 185 LBS | HEIGHT: 64 IN | BODY MASS INDEX: 31.58 KG/M2

## 2022-03-31 DIAGNOSIS — T36.95XA ANTIBIOTIC-INDUCED YEAST INFECTION: ICD-10-CM

## 2022-03-31 DIAGNOSIS — B37.9 ANTIBIOTIC-INDUCED YEAST INFECTION: ICD-10-CM

## 2022-03-31 DIAGNOSIS — J01.00 ACUTE NON-RECURRENT MAXILLARY SINUSITIS: ICD-10-CM

## 2022-03-31 DIAGNOSIS — R68.89 FLU-LIKE SYMPTOMS: ICD-10-CM

## 2022-03-31 DIAGNOSIS — H66.93 BILATERAL ACUTE OTITIS MEDIA: Primary | ICD-10-CM

## 2022-03-31 DIAGNOSIS — Z72.0 TOBACCO ABUSE: ICD-10-CM

## 2022-03-31 LAB
INFLUENZA A ANTIBODY: NEGATIVE
INFLUENZA B ANTIBODY: NEGATIVE
Lab: NORMAL
PERFORMING INSTRUMENT: NORMAL
QC PASS/FAIL: NORMAL
SARS-COV-2, POC: NORMAL

## 2022-03-31 PROCEDURE — 4004F PT TOBACCO SCREEN RCVD TLK: CPT | Performed by: NURSE PRACTITIONER

## 2022-03-31 PROCEDURE — 99213 OFFICE O/P EST LOW 20 MIN: CPT | Performed by: NURSE PRACTITIONER

## 2022-03-31 PROCEDURE — 3017F COLORECTAL CA SCREEN DOC REV: CPT | Performed by: NURSE PRACTITIONER

## 2022-03-31 PROCEDURE — G8417 CALC BMI ABV UP PARAM F/U: HCPCS | Performed by: NURSE PRACTITIONER

## 2022-03-31 PROCEDURE — 87804 INFLUENZA ASSAY W/OPTIC: CPT | Performed by: NURSE PRACTITIONER

## 2022-03-31 PROCEDURE — G8427 DOCREV CUR MEDS BY ELIG CLIN: HCPCS | Performed by: NURSE PRACTITIONER

## 2022-03-31 PROCEDURE — G8484 FLU IMMUNIZE NO ADMIN: HCPCS | Performed by: NURSE PRACTITIONER

## 2022-03-31 PROCEDURE — 87426 SARSCOV CORONAVIRUS AG IA: CPT | Performed by: NURSE PRACTITIONER

## 2022-03-31 RX ORDER — LORATADINE 10 MG/1
10 TABLET ORAL DAILY
Qty: 30 TABLET | Refills: 0 | Status: SHIPPED | OUTPATIENT
Start: 2022-03-31

## 2022-03-31 RX ORDER — GREEN TEA/HOODIA GORDONII 315-12.5MG
1 CAPSULE ORAL DAILY
Qty: 30 TABLET | Refills: 0 | Status: SHIPPED | OUTPATIENT
Start: 2022-03-31 | End: 2022-04-30

## 2022-03-31 RX ORDER — AMOXICILLIN AND CLAVULANATE POTASSIUM 875; 125 MG/1; MG/1
1 TABLET, FILM COATED ORAL 2 TIMES DAILY
Qty: 20 TABLET | Refills: 0 | Status: SHIPPED | OUTPATIENT
Start: 2022-03-31 | End: 2022-04-10

## 2022-03-31 RX ORDER — GUAIFENESIN 600 MG/1
600 TABLET, EXTENDED RELEASE ORAL 2 TIMES DAILY
Qty: 30 TABLET | Refills: 0 | Status: SHIPPED | OUTPATIENT
Start: 2022-03-31 | End: 2022-04-15

## 2022-03-31 RX ORDER — BENZONATATE 100 MG/1
100 CAPSULE ORAL NIGHTLY PRN
Qty: 10 CAPSULE | Refills: 0 | Status: SHIPPED | OUTPATIENT
Start: 2022-03-31 | End: 2022-04-10

## 2022-03-31 RX ORDER — FLUTICASONE PROPIONATE 50 MCG
1 SPRAY, SUSPENSION (ML) NASAL DAILY
Qty: 1 EACH | Refills: 0 | Status: SHIPPED | OUTPATIENT
Start: 2022-03-31

## 2022-03-31 RX ORDER — FLUCONAZOLE 150 MG/1
150 TABLET ORAL DAILY
Qty: 2 TABLET | Refills: 0 | Status: SHIPPED | OUTPATIENT
Start: 2022-03-31

## 2022-03-31 ASSESSMENT — ENCOUNTER SYMPTOMS
SORE THROAT: 1
WHEEZING: 0
EYE PAIN: 0
DIARRHEA: 1
EYE REDNESS: 0
VOMITING: 0
BLOOD IN STOOL: 0
RHINORRHEA: 1
SHORTNESS OF BREATH: 1
SINUS PRESSURE: 1
SINUS PAIN: 1
COUGH: 1
EYE DISCHARGE: 1
ABDOMINAL PAIN: 0
CONSTIPATION: 0

## 2022-03-31 ASSESSMENT — PATIENT HEALTH QUESTIONNAIRE - PHQ9
1. LITTLE INTEREST OR PLEASURE IN DOING THINGS: 0
SUM OF ALL RESPONSES TO PHQ QUESTIONS 1-9: 0
2. FEELING DOWN, DEPRESSED OR HOPELESS: 0
SUM OF ALL RESPONSES TO PHQ QUESTIONS 1-9: 0
SUM OF ALL RESPONSES TO PHQ9 QUESTIONS 1 & 2: 0

## 2022-03-31 NOTE — TELEPHONE ENCOUNTER
----- Message from Ata De La O sent at 3/31/2022 11:41 AM EDT -----  Subject: Appointment Request    Reason for Call: Urgent Cough Cold    QUESTIONS  Type of Appointment? Established Patient  Reason for appointment request? Available appointments did not meet   patient need  Additional Information for Provider? out from work for 2 1/2 days, PT has   energy loss, COVID Symptoms, but has negative test results, Sweats,   Chills, Screened Red, PT looking for appt today, can go into walk-in   clinic, but needs Dr. Keshawn Pastrana to return to work.   ---------------------------------------------------------------------------  --------------  [employer] [address]  What is the best way for the office to contact you? OK to leave message on   voicemail  Preferred Call Back Phone Number? [phone]  ---------------------------------------------------------------------------  --------------  SCRIPT ANSWERS  Relationship to Patient? Self  Are you currently unable to finish sentences due to any difficulty   breathing? No  Are you unable to swallow liquids? No  Are you having fevers (100.4 or greater), chills, or sweats? Yes   Have you been diagnosed with, awaiting test results for, or told that you   are suspected of having COVID-19 (Coronavirus)? (If patient has tested   negative or was tested as a requirement for work, school, or travel and   not based on symptoms, answer no)? No  Within the past 10 days have you developed any of the following symptoms   (answer no if symptoms have been present longer than 10 days or began   more than 10 days ago)? Fever or Chills, Cough, Shortness of breath or   difficulty breathing, Loss of taste or smell, Sore throat, Nasal   congestion, Sneezing or runny nose, Fatigue or generalized body aches   (answer no if pain is specific to a body part e.g. back pain), Diarrhea,   Headache?  Yes

## 2022-03-31 NOTE — PROGRESS NOTES
Subjective  Thais Colby, 61 y.o. female presents today with:  Chief Complaint   Patient presents with    Shortness of Breath     x2days tx: OTC medication     Cough    Nasal Congestion    Conjunctivitis     right eye    Pharyngitis    Fatigue    Headache       HPI  Patient states since Tuesday having symptoms. Cough, fatigue, rhinorrhea,sore throat, headache, chills. Hot/cold took a home test COVID  Denies fever  Reports c/o shortness of breath is not from lungs-its from congestion/sinsuses - when she gets nasal congestion this occurs   Can do ADLs and not be SOB  She is vaccinated x 2 no booster  She reports + sick contacts- grandchildren-school aged   Eating soap, light meals, denies GI complaints currently   Did not check blood sugar today- she is due for A1C- declined this today   Review of Systems   Constitutional: Positive for appetite change (reduced), chills and fatigue. Negative for fever. HENT: Positive for congestion, ear pain (popping every so often ), postnasal drip, rhinorrhea, sinus pressure, sinus pain and sore throat. Eyes: Positive for discharge (R eye draingage- yellow). Negative for pain and redness. Respiratory: Positive for cough ( mucuous-green- thick) and shortness of breath (occasional only when she has to wear a face mask). Negative for wheezing. Cardiovascular: Negative for chest pain and palpitations. Gastrointestinal: Positive for diarrhea (sunday resolved ). Negative for abdominal pain, blood in stool, constipation and vomiting. Musculoskeletal: Positive for myalgias (chronic). Skin: Negative for rash. Neurological: Positive for weakness and headaches (motrin helps ). Negative for dizziness, syncope and light-headedness.        Past Medical History:   Diagnosis Date    Anxiety state, unspecified     Headache(784.0)     Lumbago     surgery dr Tyron Rodriges Mixed hyperlipidemia     meds 2 months    Non morbid obesity due to excess calories 2/13/2016    Primary localized osteoarthrosis, lower leg     Type II diabetes mellitus, uncontrolled ([facility] [address]     dx 1 yr     Past Surgical History:   Procedure Laterality Date    APPENDECTOMY  1977    DILATION AND CURETTAGE OF UTERUS      ENDOMETRIAL ABLATION      LAMINECTOMY Right 3/27/2017    RIGHT L5-S1 MICRODISKECTOMY, 1 HR  performed by Clementina Hicks MD at [address] [facility] ARTHROSCOPY Right     RCR    TUBAL LIGATION  1999     Social History     Socioeconomic History    Marital status:      Spouse name: Not on file    Number of children: Not on file    Years of education: Not on file    Highest education level: Not on file   Occupational History    Not on file   Tobacco Use    Smoking status: Current Every Day Smoker     Packs/day: 1.00     Years: 38.00     Pack years: 38.00     Types: Cigarettes    Smokeless tobacco: Never Used    Tobacco comment: pt trying to cut back   Substance and Sexual Activity    Alcohol use: Yes     Comment: rare holidays    Drug use: No    Sexual activity: Not on file   Other Topics Concern    Not on file   Social History Narrative    Not on file     Social Determinants of Health     Financial Resource Strain: Low Risk     Difficulty of Paying Living Expenses: Not hard at all   Food Insecurity: No Food Insecurity    Worried About Running Out of Food in the Last Year: Never true    Zeb of Food in the Last Year: Never true   Transportation Needs: No Transportation Needs    Lack of Transportation (Medical): No    Lack of Transportation (Non-Medical):  No   Physical Activity:     Days of Exercise per Week: Not on file    Minutes of Exercise per Session: Not on file   Stress:     Feeling of Stress : Not on file   Social Connections:     Frequency of Communication with Friends and Family: Not on file    Frequency of Social Gatherings with Friends and Family: Not on file    Attends Sabianism Services: Not on file    Active Member of Clubs or Organizations: Not on file    Attends Club or Organization Meetings: Not on file    Marital Status: Not on file   Intimate Partner Violence:     Fear of Current or Ex-Partner: Not on file    Emotionally Abused: Not on file    Physically Abused: Not on file    Sexually Abused: Not on file   Housing Stability:     Unable to Pay for Housing in the Last Year: Not on file    Number of [place] in the Last Year: Not on file    Unstable Housing in the Last Year: Not on file     Family History   Problem Relation Age of Onset    Breast Cancer Mother     Uterine Cancer Mother     Diabetes Paternal Grandmother     Uterine Cancer Other     High Cholesterol Other      Allergies   Allergen Reactions    Metformin And Related      Diarrhea      Current Outpatient Medications   Medication Sig Dispense Refill    guaiFENesin (MUCINEX) 600 MG extended release tablet Take 1 tablet by mouth 2 times daily for 15 days 30 tablet 0    benzonatate (TESSALON) 100 MG capsule Take 1 capsule by mouth nightly as needed for Cough 10 capsule 0    fluticasone (FLONASE) 50 MCG/ACT nasal spray 1 spray by Nasal route daily 1 each 0    loratadine (CLARITIN) 10 MG tablet Take 1 tablet by mouth daily 30 tablet 0    amoxicillin-clavulanate (AUGMENTIN) 875-125 MG per tablet Take 1 tablet by mouth 2 times daily for 10 days 20 tablet 0    Probiotic Acidophilus (FLORANEX) TABS Take 1 tablet by mouth daily 30 tablet 0    fluconazole (DIFLUCAN) 150 MG tablet Take 1 tablet by mouth daily Take 1 tablet by mouth and then 72 hours later take 1 tablet by mouth.  2 tablet 0    Dulaglutide (TRULICITY) 8.77 GJ/4.6IZ SOPN Inject 0.75 mg into the skin once a week 4 pen 3    dapagliflozin (FARXIGA) 10 MG tablet Take 1 tablet by mouth every morning Lot #:[id number], Exp: 3/23 Disp: 2 boxes 14 tablet 0    atorvastatin (LIPITOR) 20 MG tablet TAKE 1 TABLET DAILY 90 tablet 3    traZODone (DESYREL) 150 MG tablet Take 1 tablet by mouth nightly 30 tablet 11    ibuprofen (ADVIL;MOTRIN) 800 MG tablet TK 1 T PO  BID PRN P 120 tablet 5    blood glucose test strips (FREESTYLE LITE) strip Pt test 3x daily dx E11.65 300 each 1    FreeStyle Lancets MISC Test 3x daily dx E11.65 300 each 1    glimepiride (AMARYL) 2 MG tablet TAKE 1 TABLET TWICE A DAY (Patient taking differently: Take 2 mg by mouth 2 times daily as needed TAKE 1 TABLET TWICE A DAY) 180 tablet 1    Insulin Pen Needle 31G X 6 MM MISC Use 1 daily 100 each 1    Alcohol Swabs 70 % PADS USE TWICE DAILY 100 each 3    Blood Glucose Monitoring Suppl (FREESTYLE LITE) ALVARO 1 Device by Does not apply route daily as needed 1 Device 0    ibuprofen (ADVIL;MOTRIN) 800 MG tablet TAKE 1 TABLET BY MOUTH TWO TIMES A DAY AS NEEDED (Patient not taking: Reported on 8/25/2021) 60 tablet 0    canagliflozin (INVOKANA) 300 MG TABS tablet Lot [id number] Exp 07/2021 and Lot [id number] Exp 12/2019 given by Ely Friend for Laveda Beau on 04/17/2019 (Patient not taking: Reported on 8/25/2021) 30 tablet 0    ondansetron (ZOFRAN ODT) 4 MG disintegrating tablet Take 1-2 tablets by mouth every 12 hours as needed for Nausea May Sub regular tablet (non-ODT) if insurance does not cover ODT. (Patient not taking: Reported on 8/25/2021) 12 tablet 0     No current facility-administered medications for this visit. PMH, Surgical Hx, Family Hx, and Social Hx reviewed and updated. Health Maintenance reviewed. Objective  Vitals:    03/31/22 1243   BP: 120/80   Pulse: 96   Temp: 95.9 °F (35.5 °C)   TempSrc: Temporal   SpO2: 98%   Weight: 185 lb (83.9 kg)   Height: 5' 3.5\" (1.613 m)     BP Readings from Last 3 Encounters:   03/31/22 120/80   12/03/21 (!) 114/58   05/28/21 117/78     Wt Readings from Last 3 Encounters:   03/31/22 185 lb (83.9 kg)   12/03/21 185 lb (83.9 kg)   05/28/21 187 lb (84.8 kg)     Physical Exam  Constitutional:       General: She is not in acute distress. Appearance: Normal appearance. She is obese. HENT:      Head: Normocephalic.       Right Ear: Tympanic membrane is erythematous and bulging. Left Ear: Tympanic membrane is erythematous and bulging. Nose: Congestion present. Mouth/Throat:      Pharynx: Oropharyngeal exudate (PND+thick yellow) and posterior oropharyngeal erythema present. Eyes:      General:         Right eye: No discharge. Left eye: No discharge. Extraocular Movements: Extraocular movements intact. Conjunctiva/sclera: Conjunctivae normal.      Pupils: Pupils are equal, round, and reactive to light. Cardiovascular:      Rate and Rhythm: Normal rate and regular rhythm. Pulses: Normal pulses. Heart sounds: Normal heart sounds. Pulmonary:      Effort: Pulmonary effort is normal. No respiratory distress. Breath sounds: Normal breath sounds. No wheezing, rhonchi or rales. Chest:      Chest wall: No tenderness. Genitourinary:     Comments: deferred  Musculoskeletal:         General: Normal range of motion. Cervical back: Normal range of motion. Lymphadenopathy:      Cervical: No cervical adenopathy. Skin:     General: Skin is warm and dry. Comments: Dry skin on ear canals, face    Neurological:      Mental Status: She is alert and oriented to person, place, and time. Mental status is at baseline. Psychiatric:         Mood and Affect: Mood normal.         Behavior: Behavior normal.       Assessment & Plan    Diagnosis Orders   1. Bilateral acute otitis media  amoxicillin-clavulanate (AUGMENTIN) 875-125 MG per tablet    Probiotic Acidophilus (FLORANEX) TABS   2. Flu-like symptoms  POCT COVID-19, Antigen    POCT Influenza A/B   3. Tobacco abuse     4. Acute non-recurrent maxillary sinusitis  guaiFENesin (MUCINEX) 600 MG extended release tablet    benzonatate (TESSALON) 100 MG capsule    fluticasone (FLONASE) 50 MCG/ACT nasal spray    loratadine (CLARITIN) 10 MG tablet    Probiotic Acidophilus (FLORANEX) TABS   5.  Antibiotic-induced yeast infection  fluconazole (DIFLUCAN) 150 MG tablet   patient with flu like symptoms flu and covid negative  maxillary sinus pressure on exam  Bilateral TM bulging/erythema Will start augmentin  Start mucinex, flonase, claritin for symptom control      Orders Placed This Encounter   Procedures    POCT COVID-19, Antigen     Order Specific Question:   Is this test for diagnosis or screening? Answer:   Diagnosis of ill patient     Order Specific Question:   Symptomatic for COVID-19 as defined by CDC? Answer:   Yes     Order Specific Question:   Date of Symptom Onset     Answer:   3/29/2022     Order Specific Question:   Hospitalized for COVID-19? Answer:   No     Order Specific Question:   Admitted to ICU for COVID-19? Answer:   No     Order Specific Question:   Employed in healthcare setting? Answer:   Unknown     Order Specific Question:   Resident in a congregate (group) care setting? Answer:   Unknown     Order Specific Question:   Pregnant? Answer:   No     Order Specific Question:   Previously tested for COVID-19? Answer:    Yes    POCT Influenza A/B     Orders Placed This Encounter   Medications    guaiFENesin (MUCINEX) 600 MG extended release tablet     Sig: Take 1 tablet by mouth 2 times daily for 15 days     Dispense:  30 tablet     Refill:  0    benzonatate (TESSALON) 100 MG capsule     Sig: Take 1 capsule by mouth nightly as needed for Cough     Dispense:  10 capsule     Refill:  0    fluticasone (FLONASE) 50 MCG/ACT nasal spray     Si spray by Nasal route daily     Dispense:  1 each     Refill:  0    loratadine (CLARITIN) 10 MG tablet     Sig: Take 1 tablet by mouth daily     Dispense:  30 tablet     Refill:  0    amoxicillin-clavulanate (AUGMENTIN) 875-125 MG per tablet     Sig: Take 1 tablet by mouth 2 times daily for 10 days     Dispense:  20 tablet     Refill:  0    Probiotic Acidophilus (FLORANEX) TABS     Sig: Take 1 tablet by mouth daily     Dispense:  30 tablet     Refill:  0    fluconazole (DIFLUCAN) 150 MG tablet     Sig: Take 1 tablet by mouth daily Take 1 tablet by mouth and then 72 hours later take 1 tablet by mouth. Dispense:  2 tablet     Refill:  0     There are no discontinued medications. Return in about 1 week (around 4/7/2022) for follow up with PCP. Reviewed with the patient: current clinical status,medications, activities and diet. Side effects, adverse effects of the medication prescribed today, as well as treatment plan/ rationale and result expectations have been discussed with the patient who expresses understanding and desires to proceed. Close follow up to evaluate treatment results and for coordination of care. I have reviewed the patient's medical history in detail and updated the computerized patient record.     Stephen Rojo, TANIKA - CNP

## 2022-03-31 NOTE — PATIENT INSTRUCTIONS
Increase fluids, rest  mucinex during the day use tessalon before bed  Warm salt water gargles, honey with tea   try not blow nose more often   Tylenol or motrin for pain control   Symptoms worsen or do not improve return or see PCP   ER for any worsening symptoms like chest pain, shortness of breath     COVID and flu negative today   Patient Education        Ear Infection (Otitis Media): Care Instructions  Overview     An ear infection may start with a cold and affect the middle ear (otitis media). It can hurt a lot. Most ear infections clear up on their own in a couple of days and do not need antibiotics. Also, antibiotics do not work against viruses, which may be the cause of your infection. Regular doses ofpain relievers are the best way to reduce your fever and help you feel better. Follow-up care is a key part of your treatment and safety. Be sure to make and go to all appointments, and call your doctor if you are having problems. It's also a good idea to know your test results and keep alist of the medicines you take. How can you care for yourself at home?  Take pain medicines exactly as directed. ? If the doctor gave you a prescription medicine for pain, take it as prescribed. ? If you are not taking a prescription pain medicine, take an over-the-counter medicine, such as acetaminophen (Tylenol), ibuprofen (Advil, Motrin), or naproxen (Aleve). Read and follow all instructions on the label. ? Do not take two or more pain medicines at the same time unless the doctor told you to. Many pain medicines have acetaminophen, which is Tylenol. Too much acetaminophen (Tylenol) can be harmful.  Plan to take a full dose of pain reliever before bedtime. Getting enough sleep will help you get better.  Try a warm, moist washcloth on the ear. It may help relieve pain.  If your doctor prescribed antibiotics, take them as directed. Do not stop taking them just because you feel better.  You need to take the full naproxen (Aleve). Read and follow all instructions on the label.  If the doctor prescribed antibiotics, take them as directed. Do not stop taking them just because you feel better. You need to take the full course of antibiotics.  Be careful when taking over-the-counter cold or flu medicines and Tylenol at the same time. Many of these medicines have acetaminophen, which is Tylenol. Read the labels to make sure that you are not taking more than the recommended dose. Too much acetaminophen (Tylenol) can be harmful.  Breathe warm, moist air from a steamy shower, a hot bath, or a sink filled with hot water. Avoid cold, dry air. Using a humidifier in your home may help. Follow the directions for cleaning the machine.  Use saline (saltwater) nasal washes. This can help keep your nasal passages open and wash out mucus and bacteria. You can buy saline nose drops at a grocery store or drugstore. Or you can make your own at home by adding 1 teaspoon of salt and 1 teaspoon of baking soda to 2 cups of distilled water. If you make your own, fill a bulb syringe with the solution, insert the tip into your nostril, and squeeze gently. Natanael [place] your nose.  Put a hot, wet towel or a warm gel pack on your face 3 or 4 times a day for 5 to 10 minutes each time.  Try a decongestant nasal spray like oxymetazoline (Afrin). Do not use it for more than 3 days in a row. Using it for more than 3 days can make your congestion worse. When should you call for help? Call your doctor now or seek immediate medical care if:     You have new or worse swelling or redness in your face or around your eyes.      You have a new or higher fever. Watch closely for changes in your health, and be sure to contact your doctor if:     You have new or worse facial pain.      The mucus from your nose becomes thicker (like pus) or has new blood in it.      You are not getting better as expected. Where can you learn more?   Go to https://chpepiceweb.Szl. org and sign in to your Bux180 account. Enter B569 in the [facility] box to learn more about \"Sinusitis: Care Instructions. \"     If you do not have an account, please click on the \"Sign Up Now\" link. Current as of: September 8, 2021               Content Version: 13.2  © 1521-8108 [employer]. Care instructions adapted under license by [facility] ([facility]). If you have questions about a medical condition or this instruction, always ask your healthcare professional. [place] [address] any warranty or liability for your use of this information. Patient Education        Learning About Benefits From Quitting Smoking  How does quitting smoking make you healthier? If you're thinking about quitting smoking, you may have a few reasons to besmoke-free. Your health may be one of them.  When you quit smoking, you lower your risks for cancer, lung disease, heart attack, stroke, blood vessel disease, and blindness from macular degeneration.  When you're smoke-free, you get sick less often, and you heal faster. You are less likely to get colds, flu, bronchitis, and pneumonia.  As a nonsmoker, you may find that your mood is better and you are less stressed. When and how will you feel healthier? Quitting has real health benefits that start from day 1 of being smoke-free. And the longer you stay smoke-free, the healthier you get and the better youfeel. The first hours   After just 20 minutes, your blood pressure and heart rate go down. That means there's less stress on your heart and blood vessels.  Within 12 hours, the level of carbon monoxide in your blood drops back to normal. That makes room for more oxygen. With more oxygen in your body, you may notice that you have more energy than when you smoked. After 2 weeks   Your lungs start to work better.  Your risk of heart attack starts to drop.   After 1 month   When your lungs are clear, you cough less and breathe deeper, so it's easier to be active.  Your sense of taste and smell return. That means you can enjoy food more than you have since you started smoking. Over the years   Over the years, your risks of heart disease, heart attack, and stroke are lower.  After 10 years, your risk of dying from lung cancer is cut by about half. And your risk for many other types of cancer is lower too. How would quitting help others in your life? When you quit smoking, you improve the health of everyone who now breathes inyour smoke.  Their heart, lung, and cancer risks drop, much like yours.  They are sick less. For babies and small children, living smoke-free means they're less likely to have ear infections, pneumonia, and bronchitis.  If you're a woman who is or will be pregnant someday, quitting smoking means a healthier .  Children who are close to you are less likely to become adult smokers. Where can you learn more? Go to https://Quilljohannacliniq.ly.MBDC Media. org and sign in to your Diasome account. Enter [phone] in the Internet[employer] box to learn more about \"Learning About Benefits From Quitting Smoking. \"     If you do not have an account, please click on the \"Sign Up Now\" link. Current as of: 2021               Content Version: 13.2  © 6942-8560 [employer]wise[employer]orporated. Care instructions adapted under license by [facility] ([facility]). If you have questions about a medical condition or this instruction, always ask your healthcare professional. [place] [address] any warranty or liability for your use of this information.

## 2022-03-31 NOTE — LETTER
[facility]  [address] [place] [address]  Phone: [phone]  Fax: [phone]    TANIKA Leonardo CNP        March 31, 2022     Patient: Saloni Estes   YOB: 1965   Date of Visit: 3/31/2022       To Whom it May Concern:    Tessy Mae was seen in my clinic on 3/31/2022. She may return to work on 4/4/2022 if her symptoms have improved and remains fever free without the use of fever reducing medications. If you have any questions or concerns, please don't hesitate to call.     Sincerely,         TANIKA Leonardo CNP

## 2022-04-03 ENCOUNTER — HOSPITAL ENCOUNTER (EMERGENCY)
Age: 57
Discharge: HOME | End: 2022-04-03
Payer: COMMERCIAL

## 2022-04-03 VITALS
HEIGHT: 64 IN | WEIGHT: 199 LBS | HEART RATE: 86 BPM | DIASTOLIC BLOOD PRESSURE: 81 MMHG | OXYGEN SATURATION: 98 % | SYSTOLIC BLOOD PRESSURE: 120 MMHG | TEMPERATURE: 98 F | BODY MASS INDEX: 33.97 KG/M2 | RESPIRATION RATE: 19 BRPM

## 2022-04-03 DIAGNOSIS — K52.1 DIARRHEA DUE TO DRUG: Primary | ICD-10-CM

## 2022-04-03 LAB
ALBUMIN SERPL-MCNC: 4.4 G/DL (ref 3.5–4.6)
ALP BLD-CCNC: 118 U/L (ref 40–130)
ALT SERPL-CCNC: 23 U/L (ref 0–33)
ANION GAP SERPL CALCULATED.3IONS-SCNC: 11 MEQ/L (ref 9–15)
AST SERPL-CCNC: 16 U/L (ref 0–35)
BASOPHILS ABSOLUTE: 0.1 K/UL (ref 0–0.2)
BASOPHILS RELATIVE PERCENT: 0.8 %
BILIRUB SERPL-MCNC: 0.3 MG/DL (ref 0.2–0.7)
BUN BLDV-MCNC: 15 MG/DL (ref 6–20)
CALCIUM SERPL-MCNC: 9.5 MG/DL (ref 8.5–9.9)
CHLORIDE BLD-SCNC: 100 MEQ/L (ref 95–107)
CO2: 25 MEQ/L (ref 20–31)
CREAT SERPL-MCNC: 0.6 MG/DL (ref 0.5–0.9)
EOSINOPHILS ABSOLUTE: 0.2 K/UL (ref 0–0.7)
EOSINOPHILS RELATIVE PERCENT: 2.9 %
GFR AFRICAN AMERICAN: >60
GFR NON-AFRICAN AMERICAN: >60
GLOBULIN: 2.9 G/DL (ref 2.3–3.5)
GLUCOSE BLD-MCNC: 212 MG/DL (ref 70–99)
HCT VFR BLD CALC: 45.7 % (ref 37–47)
HEMOGLOBIN: 15.3 G/DL (ref 12–16)
LACTIC ACID: 1.8 MMOL/L (ref 0.5–2.2)
LIPASE: 36 U/L (ref 12–95)
LYMPHOCYTES ABSOLUTE: 2.2 K/UL (ref 1–4.8)
LYMPHOCYTES RELATIVE PERCENT: 26.7 %
MCH RBC QN AUTO: 29.8 PG (ref 27–31.3)
MCHC RBC AUTO-ENTMCNC: 33.5 % (ref 33–37)
MCV RBC AUTO: 88.8 FL (ref 82–100)
MONOCYTES ABSOLUTE: 0.3 K/UL (ref 0.2–0.8)
MONOCYTES RELATIVE PERCENT: 3.9 %
NEUTROPHILS ABSOLUTE: 5.5 K/UL (ref 1.4–6.5)
NEUTROPHILS RELATIVE PERCENT: 65.7 %
PDW BLD-RTO: 14 % (ref 11.5–14.5)
PLATELET # BLD: 268 K/UL (ref 130–400)
POTASSIUM SERPL-SCNC: 4 MEQ/L (ref 3.4–4.9)
RBC # BLD: 5.15 M/UL (ref 4.2–5.4)
SODIUM BLD-SCNC: 136 MEQ/L (ref 135–144)
TOTAL PROTEIN: 7.3 G/DL (ref 6.3–8)
WBC # BLD: 8.4 K/UL (ref 4.8–10.8)

## 2022-04-03 PROCEDURE — 83605 ASSAY OF LACTIC ACID: CPT

## 2022-04-03 PROCEDURE — 85025 COMPLETE CBC W/AUTO DIFF WBC: CPT

## 2022-04-03 PROCEDURE — 99283 EMERGENCY DEPT VISIT LOW MDM: CPT

## 2022-04-03 PROCEDURE — 96360 HYDRATION IV INFUSION INIT: CPT

## 2022-04-03 PROCEDURE — 36415 COLL VENOUS BLD VENIPUNCTURE: CPT

## 2022-04-03 PROCEDURE — 80053 COMPREHEN METABOLIC PANEL: CPT

## 2022-04-03 PROCEDURE — 83690 ASSAY OF LIPASE: CPT

## 2022-04-03 PROCEDURE — 2580000003 HC RX 258: Performed by: PHYSICIAN ASSISTANT

## 2022-04-03 RX ORDER — AZITHROMYCIN 250 MG/1
TABLET, FILM COATED ORAL
Qty: 1 PACKET | Refills: 0 | Status: SHIPPED | OUTPATIENT
Start: 2022-04-03 | End: 2022-04-13

## 2022-04-03 RX ORDER — 0.9 % SODIUM CHLORIDE 0.9 %
1000 INTRAVENOUS SOLUTION INTRAVENOUS ONCE
Status: COMPLETED | OUTPATIENT
Start: 2022-04-03 | End: 2022-04-03

## 2022-04-03 RX ADMIN — SODIUM CHLORIDE 1000 ML: 9 INJECTION, SOLUTION INTRAVENOUS at 11:03

## 2022-04-03 ASSESSMENT — ENCOUNTER SYMPTOMS
ABDOMINAL DISTENTION: 0
COLOR CHANGE: 0
SHORTNESS OF BREATH: 0
RHINORRHEA: 0
RECTAL PAIN: 0
ABDOMINAL PAIN: 1
VOMITING: 0
NAUSEA: 0
CONSTIPATION: 0
DIARRHEA: 1
EYE DISCHARGE: 0
SORE THROAT: 0

## 2022-04-03 ASSESSMENT — PAIN DESCRIPTION - LOCATION: LOCATION: ABDOMEN

## 2022-04-03 ASSESSMENT — PAIN DESCRIPTION - DESCRIPTORS: DESCRIPTORS: ACHING;CRAMPING

## 2022-04-03 ASSESSMENT — PAIN DESCRIPTION - PAIN TYPE: TYPE: ACUTE PAIN

## 2022-04-03 ASSESSMENT — PAIN - FUNCTIONAL ASSESSMENT: PAIN_FUNCTIONAL_ASSESSMENT: 0-10

## 2022-04-03 ASSESSMENT — PAIN SCALES - GENERAL: PAINLEVEL_OUTOF10: 3

## 2022-04-03 NOTE — ED PROVIDER NOTES
[address] [facility] ED  eMERGENCY dEPARTMENT eNCOUnter      Pt Name: Harish Jackson  MRN: [id number]  [place] 1965  Date of evaluation: 4/3/2022  Provider: Todd Sheffield PA-C    CHIEF COMPLAINT       Chief Complaint   Patient presents with    Diarrhea         HISTORY OF PRESENT ILLNESS   (Location/Symptom, Timing/Onset,Context/Setting, Quality, Duration, Modifying Factors, Severity)  Note limiting factors. Harish Jackson is a 61 y.o. female who presents to the emergency department with complaint of diarrhea which patient states been ongoing for last 2 days. She states she had recent been treated for otitis media, she is on Augmentin, as she states with the last 24hours she is having multiple episodes of watery diarrhea per day. She complains of mild abdominal cramping, no fevers, no cough or shortness of breath. Patient denies any dizziness blurred vision or weakness. Past medical history significant for anxiety headaches, type 2 diabetes. HPI    NursingNotes were reviewed. REVIEW OF SYSTEMS    (2-9 systems for level 4, 10 or more for level 5)     Review of Systems   Constitutional: Negative for activity change and appetite change. HENT: Negative for congestion, ear discharge, ear pain, nosebleeds, rhinorrhea and sore throat. Eyes: Negative for discharge. Respiratory: Negative for shortness of breath. Cardiovascular: Negative for chest pain, palpitations and leg swelling. Gastrointestinal: Positive for abdominal pain and diarrhea. Negative for abdominal distention, constipation, nausea, rectal pain and vomiting. Genitourinary: Negative for difficulty urinating and dysuria. Musculoskeletal: Negative for arthralgias. Skin: Negative for color change, pallor and wound. Neurological: Negative for dizziness, tremors, syncope, weakness, numbness and headaches. Psychiatric/Behavioral: Negative for agitation and confusion.        Except as noted above the remainder of the review of systems was reviewed and negative. PAST MEDICAL HISTORY     Past Medical History:   Diagnosis Date    Anxiety state, unspecified     Headache(784.0)     Lumbago     surgery dr Hannah Rendon Mixed hyperlipidemia     meds 2 months    Non morbid obesity due to excess calories 2/13/2016    Primary localized osteoarthrosis, lower leg     Type II diabetes mellitus, uncontrolled ([facility] [address]     dx 1 yr         SURGICALHISTORY       Past Surgical History:   Procedure Laterality Date    APPENDECTOMY  1977    DILATION AND CURETTAGE OF UTERUS      ENDOMETRIAL ABLATION      LAMINECTOMY Right 3/27/2017    RIGHT L5-S1 MICRODISKECTOMY, 1 HR  performed by Jeffrey Solo MD at [address] [facility] ARTHROSCOPY Right     RCR    TUBAL LIGATION  1999         CURRENT MEDICATIONS       Previous Medications    ALCOHOL SWABS 70 % PADS    USE TWICE DAILY    AMOXICILLIN-CLAVULANATE (AUGMENTIN) 875-125 MG PER TABLET    Take 1 tablet by mouth 2 times daily for 10 days    ATORVASTATIN (LIPITOR) 20 MG TABLET    TAKE 1 TABLET DAILY    BENZONATATE (TESSALON) 100 MG CAPSULE    Take 1 capsule by mouth nightly as needed for Cough    BLOOD GLUCOSE MONITORING SUPPL (FREESTYLE LITE) ALVARO    1 Device by Does not apply route daily as needed    BLOOD GLUCOSE TEST STRIPS (FREESTYLE LITE) STRIP    Pt test 3x daily dx E11.65    CANAGLIFLOZIN (INVOKANA) 300 MG TABS TABLET    Lot [id number] Exp 07/2021 and Lot [id number] Exp 12/2019 given by Keisha Corona on 04/17/2019    DAPAGLIFLOZIN (FARXIGA) 10 MG TABLET    Take 1 tablet by mouth every morning Lot #:[id number], Exp: 3/23 Disp: 2 boxes    DULAGLUTIDE (TRULICITY) 1.27 MK/6.5VV SOPN    Inject 0.75 mg into the skin once a week    FLUCONAZOLE (DIFLUCAN) 150 MG TABLET    Take 1 tablet by mouth daily Take 1 tablet by mouth and then 72 hours later take 1 tablet by mouth.     FLUTICASONE (FLONASE) 50 MCG/ACT NASAL SPRAY    1 spray by Nasal route daily    FREESTYLE LANCETS MISC    Test 3x daily dx E11.65 GLIMEPIRIDE (AMARYL) 2 MG TABLET    TAKE 1 TABLET TWICE A DAY    GUAIFENESIN (MUCINEX) 600 MG EXTENDED RELEASE TABLET    Take 1 tablet by mouth 2 times daily for 15 days    IBUPROFEN (ADVIL;MOTRIN) 800 MG TABLET    TAKE 1 TABLET BY MOUTH TWO TIMES A DAY AS NEEDED    IBUPROFEN (ADVIL;MOTRIN) 800 MG TABLET    TK 1 T PO  BID PRN P    INSULIN PEN NEEDLE 31G X 6 MM MISC    Use 1 daily    LORATADINE (CLARITIN) 10 MG TABLET    Take 1 tablet by mouth daily    ONDANSETRON (ZOFRAN ODT) 4 MG DISINTEGRATING TABLET    Take 1-2 tablets by mouth every 12 hours as needed for Nausea May Sub regular tablet (non-ODT) if insurance does not cover ODT.     PROBIOTIC ACIDOPHILUS (FLORANEX) TABS    Take 1 tablet by mouth daily    TRAZODONE (DESYREL) 150 MG TABLET    Take 1 tablet by mouth nightly       ALLERGIES     Metformin and related    FAMILY HISTORY       Family History   Problem Relation Age of Onset    Breast Cancer Mother     Uterine Cancer Mother     Diabetes Paternal Grandmother     Uterine Cancer Other     High Cholesterol Other           SOCIAL HISTORY       Social History     Socioeconomic History    Marital status:      Spouse name: None    Number of children: None    Years of education: None    Highest education level: None   Occupational History    None   Tobacco Use    Smoking status: Current Every Day Smoker     Packs/day: 1.00     Years: 38.00     Pack years: 38.00     Types: Cigarettes    Smokeless tobacco: Never Used    Tobacco comment: pt trying to cut back   Substance and Sexual Activity    Alcohol use: Yes     Comment: rare holidays    Drug use: No    Sexual activity: None   Other Topics Concern    None   Social History Narrative    None     Social Determinants of Health     Financial Resource Strain: Low Risk     Difficulty of Paying Living Expenses: Not hard at all   Food Insecurity: No Food Insecurity    Worried About Running Out of Food in the Last Year: Never true    Zeb of [employer] in the Last Year: Never true   Transportation Needs: No Transportation Needs    Lack of Transportation (Medical): No    Lack of Transportation (Non-Medical): No   Physical Activity:     Days of Exercise per Week: Not on file    Minutes of Exercise per Session: Not on file   Stress:     Feeling of Stress : Not on file   Social Connections:     Frequency of Communication with Friends and Family: Not on file    Frequency of Social Gatherings with Friends and Family: Not on file    Attends [employer] Services: Not on file    Active Member of [address] [place] [address] [employer] or Organizations: Not on file    Attends Club or Organization Meetings: Not on file    Marital Status: Not on file   Intimate Partner Violence:     Fear of Current or Ex-Partner: Not on file    Emotionally Abused: Not on file    Physically Abused: Not on file    Sexually Abused: Not on file   Housing Stability:     Unable to Pay for Housing in the Last Year: Not on file    Number of [place] in the Last Year: Not on file    Unstable Housing in the Last Year: Not on file       SCREENINGS    Ben Coma Scale  Eye Opening: Spontaneous  Best Verbal Response: Oriented  Best Motor Response: Obeys commands  Ben Coma Scale Score: 15 @FLOW([id number])@      PHYSICAL EXAM    (up to 7 for level 4, 8 or more for level 5)     ED Triage Vitals [04/03/22 1038]   BP Temp Temp src Pulse Resp SpO2 Height Weight   130/74 98 °F (36.7 °C) -- 94 19 96 % 5' 4\" (1.626 m) 199 lb (90.3 kg)       Physical Exam  Vitals and nursing note reviewed. Constitutional:       General: She is not in acute distress. Appearance: Normal appearance. She is well-developed. She is not ill-appearing, toxic-appearing or diaphoretic. Comments: Nontoxic appearance, patient looks well   HENT:      Head: Normocephalic. Right Ear: Tympanic membrane normal.      Left Ear: Tympanic membrane normal.      Nose: No congestion.       Mouth/Throat:      Mouth: Mucous membranes are moist. Pharynx: No oropharyngeal exudate or posterior oropharyngeal erythema. Eyes:      Extraocular Movements: Extraocular movements intact. Conjunctiva/sclera: Conjunctivae normal.      Pupils: Pupils are equal, round, and reactive to light. Neck:      Vascular: No JVD. Trachea: No tracheal deviation. Cardiovascular:      Rate and Rhythm: Normal rate. Pulses: Normal pulses. Heart sounds: Normal heart sounds. No murmur heard. No friction rub. No gallop. Pulmonary:      Effort: Pulmonary effort is normal. No tachypnea, accessory muscle usage, respiratory distress or retractions. Breath sounds: Normal breath sounds. No stridor. No wheezing, rhonchi or rales. Chest:      Chest wall: No tenderness. Abdominal:      General: Abdomen is flat. Bowel sounds are normal. There is no distension or abdominal bruit. Palpations: Abdomen is soft. There is no shifting dullness, fluid wave, hepatomegaly, splenomegaly, mass or pulsatile mass. Tenderness: There is no abdominal tenderness. There is no right CVA tenderness, left CVA tenderness, guarding or rebound. Negative signs include Bruce's sign, Rovsing's sign and McBurney's sign. Comments: Abdomen soft nondistended nontender no guarding mass rebound, no CVA tenderness. Musculoskeletal:         General: No deformity. Normal range of motion. Cervical back: Normal range of motion and neck supple. No rigidity. Skin:     General: Skin is warm and dry. Capillary Refill: Capillary refill takes less than 2 seconds. Coloration: Skin is not jaundiced. Neurological:      General: No focal deficit present. Mental Status: She is alert and oriented to person, place, and time. Mental status is at baseline. Cranial Nerves: No cranial nerve deficit. Sensory: No sensory deficit. Motor: No weakness.       Coordination: Coordination normal.   Psychiatric:         Mood and Affect: Mood normal.         DIAGNOSTIC RESULTS     EKG: All EKG's are interpreted by the Emergency Department Physician who either signs or Co-signsthis chart in the absence of a cardiologist.        RADIOLOGY:   [place] such as CT, Ultrasound and MRI are read by the radiologist. Plain radiographic images are visualized and preliminarily interpreted by the emergency physician with the below findings:        Interpretation per the Radiologist below, if available at the time ofthis note:    No orders to display         ED BEDSIDE ULTRASOUND:   Performed by ED Physician - none    LABS:  Labs Reviewed   COMPREHENSIVE METABOLIC PANEL - Abnormal; Notable for the following components:       Result Value    Glucose 212 (*)     All other components within normal limits   CBC WITH AUTO DIFFERENTIAL   LACTIC ACID   LIPASE       All other labs were within normal range or not returned as of this dictation. EMERGENCY DEPARTMENT COURSE and DIFFERENTIAL DIAGNOSIS/MDM:   Vitals:    Vitals:    04/03/22 1038 04/03/22 1149 04/03/22 1152   BP: 130/74 120/81    Pulse: 94  86   Resp: 19     Temp: 98 °F (36.7 °C)     SpO2: 96%  98%   Weight: 199 lb (90.3 kg)     Height: 5' 4\" (1.626 m)            MDM  Number of Diagnoses or Management Options  Diarrhea due to drug  Diagnosis management comments: Patient presented ED with complaint of diarrhea which she states started yesterday for her, she had recently started on Augmentin for otitis media which was treated in urgent care center 3 days ago. She states since starting the Augmentin this is an issue for the diarrhea started for her, she has mild abdominal cramping. She does not appear in acute distress, she is nontoxic in appearance, labs are fairly unimpressive at this time, she was given IV fluids for hydration, and during her visit in the ED has had no recurrent episodes of diarrhea.   I did discuss with the patient concerns for antibiotic induced diarrhea and the concerns for placing on her probiotics, as well as eating yogurt, or switching antibiotics. She chose to switch antibiotics, she was started on Zithromax, I did advise her still that she should not continue with probiotics is given through the urgent care center, and yogurt to aid and diarrhea control. Should she have any worsening condition, fevers, worsening pain, dizziness or nausea, she was advised to return to ED for reevaluation. CRITICAL CARE TIME   Total Critical Care time was 0 minutes, excluding separately reportableprocedures. There was a high probability of clinicallysignificant/life threatening deterioration in the patient's condition which required my urgent intervention. CONSULTS:  None    PROCEDURES:  Unless otherwise noted below, none     Procedures    FINAL IMPRESSION      1. Diarrhea due to drug          DISPOSITION/PLAN   DISPOSITION Decision To Discharge 04/03/2022 11:57:23 AM      PATIENT REFERRED TO:  Juan Payne MD  [address] [place] [address]  [phone]    In 3 days        DISCHARGE MEDICATIONS:  New Prescriptions    AZITHROMYCIN (ZITHROMAX) 250 MG TABLET    Take 2 tablets (500 mg) on Day 1, followed by 1 tablet (250 mg) once daily on Days 2 through 5.           (Please note that portions of this note were completed with a voice recognition program.  Efforts were made to edit the dictations but occasionally words are mis-transcribed.)    Gina Isaac PA-C (electronically signed)  Attending Emergency Physician         Gina Isaac PA-C  04/03/22 9284

## 2022-04-03 NOTE — ED NOTES
Discharge instructions explained to patient. Patient instructed to stop taking her previously prescribed Augmentin and to began taking the Zithromax. Patient verbalized understanding.       Noemi Kong RN  04/03/22 3504

## 2022-04-03 NOTE — ED NOTES
IV placed and blood collected. IV fluids hung and infusing. Blood sent to lab. Patient reports that she is currently being treated for a sinus infection. Patient started on Augmentin, Tessalon and Claritin on Wednesday. Patient reports diarrhea that started on Saturday.       Karl Hunter RN  04/03/22 7922

## 2022-04-03 NOTE — ED NOTES
Vital signs obtained. IV fluids continue to infuse. Talat TEMPLE in room to talk with patient.       Ana Christy RN  04/03/22 6054

## 2022-04-03 NOTE — Clinical Note
Marquis Hook was seen and treated in our emergency department on 4/3/2022. She may return to work on 04/05/2022. If you have any questions or concerns, please don't hesitate to call.       Nick Dumont PA-C

## 2022-04-18 ENCOUNTER — APPOINTMENT (OUTPATIENT)
Dept: ULTRASOUND IMAGING | Age: 57
End: 2022-04-18
Payer: COMMERCIAL

## 2022-04-18 ENCOUNTER — HOSPITAL ENCOUNTER (EMERGENCY)
Age: 57
Discharge: HOME | End: 2022-04-18
Attending: EMERGENCY MEDICINE
Payer: COMMERCIAL

## 2022-04-18 VITALS
OXYGEN SATURATION: 97 % | HEIGHT: 63 IN | RESPIRATION RATE: 16 BRPM | TEMPERATURE: 98.6 F | BODY MASS INDEX: 31.82 KG/M2 | DIASTOLIC BLOOD PRESSURE: 81 MMHG | SYSTOLIC BLOOD PRESSURE: 144 MMHG | HEART RATE: 80 BPM | WEIGHT: 179.6 LBS

## 2022-04-18 DIAGNOSIS — R10.11 ABDOMINAL PAIN, RIGHT UPPER QUADRANT: Primary | ICD-10-CM

## 2022-04-18 LAB
ALBUMIN SERPL-MCNC: 4.5 G/DL (ref 3.5–4.6)
ALP BLD-CCNC: 100 U/L (ref 40–130)
ALT SERPL-CCNC: 23 U/L (ref 0–33)
AMYLASE: 68 U/L (ref 22–93)
ANION GAP SERPL CALCULATED.3IONS-SCNC: 13 MEQ/L (ref 9–15)
AST SERPL-CCNC: 16 U/L (ref 0–35)
BASOPHILS ABSOLUTE: 0.1 K/UL (ref 0–0.2)
BASOPHILS RELATIVE PERCENT: 0.9 %
BILIRUB SERPL-MCNC: 0.3 MG/DL (ref 0.2–0.7)
BILIRUBIN URINE: NEGATIVE
BLOOD, URINE: NEGATIVE
BUN BLDV-MCNC: 13 MG/DL (ref 6–20)
CALCIUM SERPL-MCNC: 9.1 MG/DL (ref 8.5–9.9)
CHLORIDE BLD-SCNC: 100 MEQ/L (ref 95–107)
CLARITY: ABNORMAL
CO2: 24 MEQ/L (ref 20–31)
COLOR: YELLOW
CREAT SERPL-MCNC: 0.47 MG/DL (ref 0.5–0.9)
EOSINOPHILS ABSOLUTE: 0.2 K/UL (ref 0–0.7)
EOSINOPHILS RELATIVE PERCENT: 2.3 %
GFR AFRICAN AMERICAN: >60
GFR NON-AFRICAN AMERICAN: >60
GLOBULIN: 2.8 G/DL (ref 2.3–3.5)
GLUCOSE BLD-MCNC: 103 MG/DL (ref 70–99)
GLUCOSE URINE: >=1000 MG/DL
HCG, URINE, POC: NEGATIVE
HCT VFR BLD CALC: 46.3 % (ref 37–47)
HEMOGLOBIN: 15.4 G/DL (ref 12–16)
KETONES, URINE: NEGATIVE MG/DL
LEUKOCYTE ESTERASE, URINE: NEGATIVE
LIPASE: 62 U/L (ref 12–95)
LYMPHOCYTES ABSOLUTE: 3 K/UL (ref 1–4.8)
LYMPHOCYTES RELATIVE PERCENT: 29.7 %
Lab: NORMAL
MCH RBC QN AUTO: 29.5 PG (ref 27–31.3)
MCHC RBC AUTO-ENTMCNC: 33.2 % (ref 33–37)
MCV RBC AUTO: 88.8 FL (ref 82–100)
MONOCYTES ABSOLUTE: 0.4 K/UL (ref 0.2–0.8)
MONOCYTES RELATIVE PERCENT: 4.3 %
NEGATIVE QC PASS/FAIL: NORMAL
NEUTROPHILS ABSOLUTE: 6.4 K/UL (ref 1.4–6.5)
NEUTROPHILS RELATIVE PERCENT: 62.8 %
NITRITE, URINE: NEGATIVE
PDW BLD-RTO: 14.2 % (ref 11.5–14.5)
PH UA: 5 (ref 5–9)
PLATELET # BLD: 248 K/UL (ref 130–400)
POSITIVE QC PASS/FAIL: NORMAL
POTASSIUM SERPL-SCNC: 4 MEQ/L (ref 3.4–4.9)
PROTEIN UA: NEGATIVE MG/DL
RBC # BLD: 5.21 M/UL (ref 4.2–5.4)
SODIUM BLD-SCNC: 137 MEQ/L (ref 135–144)
SPECIFIC GRAVITY UA: 1.03 (ref 1–1.03)
TOTAL PROTEIN: 7.3 G/DL (ref 6.3–8)
URINE REFLEX TO CULTURE: ABNORMAL
UROBILINOGEN, URINE: 0.2 E.U./DL
WBC # BLD: 10.2 K/UL (ref 4.8–10.8)

## 2022-04-18 PROCEDURE — 76705 ECHO EXAM OF ABDOMEN: CPT

## 2022-04-18 PROCEDURE — 85025 COMPLETE CBC W/AUTO DIFF WBC: CPT

## 2022-04-18 PROCEDURE — 83690 ASSAY OF LIPASE: CPT

## 2022-04-18 PROCEDURE — 99283 EMERGENCY DEPT VISIT LOW MDM: CPT

## 2022-04-18 PROCEDURE — 81003 URINALYSIS AUTO W/O SCOPE: CPT

## 2022-04-18 PROCEDURE — 80053 COMPREHEN METABOLIC PANEL: CPT

## 2022-04-18 PROCEDURE — 82150 ASSAY OF AMYLASE: CPT

## 2022-04-18 PROCEDURE — 6360000002 HC RX W HCPCS: Performed by: EMERGENCY MEDICINE

## 2022-04-18 PROCEDURE — 36415 COLL VENOUS BLD VENIPUNCTURE: CPT

## 2022-04-18 PROCEDURE — 96374 THER/PROPH/DIAG INJ IV PUSH: CPT

## 2022-04-18 RX ORDER — KETOROLAC TROMETHAMINE 15 MG/ML
15 INJECTION, SOLUTION INTRAMUSCULAR; INTRAVENOUS ONCE
Status: COMPLETED | OUTPATIENT
Start: 2022-04-18 | End: 2022-04-18

## 2022-04-18 RX ADMIN — KETOROLAC TROMETHAMINE 15 MG: 15 INJECTION, SOLUTION INTRAMUSCULAR; INTRAVENOUS at 16:21

## 2022-04-18 ASSESSMENT — ENCOUNTER SYMPTOMS: ABDOMINAL PAIN: 1

## 2022-04-18 ASSESSMENT — PAIN DESCRIPTION - LOCATION
LOCATION: ABDOMEN
LOCATION: ABDOMEN

## 2022-04-18 ASSESSMENT — PAIN - FUNCTIONAL ASSESSMENT: PAIN_FUNCTIONAL_ASSESSMENT: 0-10

## 2022-04-18 ASSESSMENT — PAIN SCALES - GENERAL
PAINLEVEL_OUTOF10: 7
PAINLEVEL_OUTOF10: 7

## 2022-04-18 ASSESSMENT — PAIN DESCRIPTION - PAIN TYPE
TYPE: ACUTE PAIN
TYPE: ACUTE PAIN

## 2022-04-18 ASSESSMENT — PAIN DESCRIPTION - DESCRIPTORS: DESCRIPTORS: STABBING

## 2022-04-18 ASSESSMENT — PAIN DESCRIPTION - ORIENTATION: ORIENTATION: LEFT;UPPER

## 2022-04-18 ASSESSMENT — PAIN DESCRIPTION - FREQUENCY: FREQUENCY: CONTINUOUS

## 2022-04-18 NOTE — ED TRIAGE NOTES
A & Ox4. Skin pink warm and dry. Pt states she has had RUQ abdominal pain x 2 weeks and started radiating around to back yesterday. States she is nauseated but no vomiting. States last BM was normal and this morning. States pain worse after eating. States last ate or drank was 0700 today.

## 2022-04-18 NOTE — ED PROVIDER NOTES
[facility] [facility]  EMERGENCY DEPARTMENT ENCOUNTER      Pt Name: Jatinder Simmons  MRN: [id number]  Ynes 1965  Date of evaluation: 4/18/2022  Provider: Jazmín Rosales MD    [address] [place] [address]       Chief Complaint   Patient presents with    Abdominal Pain     RUQ abdominal pain x 2 weeks, worse after eating         HISTORY OF PRESENT ILLNESS   (Location/Symptom, Timing/Onset, Context/Setting, Quality, Duration, Modifying Factors, Severity)  Note limiting factors. 58-year-old female presenting with right upper quadrant abdominal pain. Symptoms have been ongoing for about 2 weeks. Not made better or worse by anything in particular. Pain localized to the right upper region. No fevers, nausea or vomiting. Has not taken anything for relief prior to arrival.          Nursing Notes were reviewed. REVIEW OF SYSTEMS    (2-9 systems for level 4, 10 or more for level 5)     Review of Systems   Gastrointestinal: Positive for abdominal pain. All other systems reviewed and are negative. Except as noted above the remainder of the review of systems was reviewed and negative.        PAST MEDICAL HISTORY     Past Medical History:   Diagnosis Date    Anxiety state, unspecified     Headache(784.0)     Lumbago     surgery dr Hannah Rendon Mixed hyperlipidemia     meds 2 months    Non morbid obesity due to excess calories 2/13/2016    Primary localized osteoarthrosis, lower leg     Type II diabetes mellitus, uncontrolled ([address]     dx 1 yr         SURGICAL HISTORY       Past Surgical History:   Procedure Laterality Date    APPENDECTOMY  1977    DILATION AND CURETTAGE OF UTERUS      ENDOMETRIAL ABLATION      LAMINECTOMY Right 3/27/2017    RIGHT L5-S1 MICRODISKECTOMY, 1 HR  performed by Jeffrey Solo MD at [address] ARTHROSCOPY Right     R   [facility]       Current Discharge Medication List      CONTINUE these medications which have NOT CHANGED    Details fluticasone (FLONASE) 50 MCG/ACT nasal spray 1 spray by Nasal route daily  Qty: 1 each, Refills: 0    Associated Diagnoses: Acute non-recurrent maxillary sinusitis      loratadine (CLARITIN) 10 MG tablet Take 1 tablet by mouth daily  Qty: 30 tablet, Refills: 0    Associated Diagnoses: Acute non-recurrent maxillary sinusitis      Probiotic Acidophilus (FLORANEX) TABS Take 1 tablet by mouth daily  Qty: 30 tablet, Refills: 0    Associated Diagnoses: Acute non-recurrent maxillary sinusitis; Bilateral acute otitis media      fluconazole (DIFLUCAN) 150 MG tablet Take 1 tablet by mouth daily Take 1 tablet by mouth and then 72 hours later take 1 tablet by mouth.   Qty: 2 tablet, Refills: 0    Associated Diagnoses: Antibiotic-induced yeast infection      Dulaglutide (TRULICITY) 1.01 AR/1.5OV SOPN Inject 0.75 mg into the skin once a week  Qty: 4 pen, Refills: 3      dapagliflozin (FARXIGA) 10 MG tablet Take 1 tablet by mouth every morning Lot #:[id number], Exp: 3/23 Disp: 2 boxes  Qty: 14 tablet, Refills: 0      atorvastatin (LIPITOR) 20 MG tablet TAKE 1 TABLET DAILY  Qty: 90 tablet, Refills: 3    Associated Diagnoses: Mixed hyperlipidemia      traZODone (DESYREL) 150 MG tablet Take 1 tablet by mouth nightly  Qty: 30 tablet, Refills: 11      !! ibuprofen (ADVIL;MOTRIN) 800 MG tablet TK 1 T PO  BID PRN P  Qty: 120 tablet, Refills: 5      !! ibuprofen (ADVIL;MOTRIN) 800 MG tablet TAKE 1 TABLET BY MOUTH TWO TIMES A DAY AS NEEDED  Qty: 60 tablet, Refills: 0    Associated Diagnoses: Acute left-sided low back pain without sciatica      blood glucose test strips (FREESTYLE LITE) strip Pt test 3x daily dx E11.65  Qty: 300 each, Refills: 1      FreeStyle Lancets MISC Test 3x daily dx E11.65  Qty: 300 each, Refills: 1      glimepiride (AMARYL) 2 MG tablet TAKE 1 TABLET TWICE A DAY  Qty: 180 tablet, Refills: 1      canagliflozin (INVOKANA) 300 MG TABS tablet Lot [id number] Exp 07/2021 and Lot [id number] Exp 12/2019 given by Janis Corona on 04/17/2019  Qty: 30 tablet, Refills: 0      ondansetron (ZOFRAN ODT) 4 MG disintegrating tablet Take 1-2 tablets by mouth every 12 hours as needed for Nausea May Sub regular tablet (non-ODT) if insurance does not cover ODT. Qty: 12 tablet, Refills: 0      Insulin Pen Needle 31G X 6 MM MISC Use 1 daily  Qty: 100 each, Refills: 1      Alcohol Swabs 70 % PADS USE TWICE DAILY  Qty: 100 each, Refills: 3      Blood Glucose Monitoring Suppl (FREESTYLE LITE) ALVARO 1 Device by Does not apply route daily as needed  Qty: 1 Device, Refills: 0       !! - Potential duplicate medications found. Please discuss with provider. ALLERGIES     Metformin and related    FAMILY HISTORY       Family History   Problem Relation Age of Onset    Breast Cancer Mother     Uterine Cancer Mother     Diabetes Paternal Grandmother     Uterine Cancer Other     High Cholesterol Other           SOCIAL HISTORY       Social History     Socioeconomic History    Marital status:      Spouse name: None    Number of children: None    Years of education: None    Highest education level: None   Occupational History    None   Tobacco Use    Smoking status: Current Every Day Smoker     Packs/day: 0.25     Years: 38.00     Pack years: 9.50     Types: Cigarettes    Smokeless tobacco: Never Used   Vaping Use    Vaping Use: Never used   Substance and Sexual Activity    Alcohol use: Yes     Comment: rare holidays    Drug use: No    Sexual activity: None   Other Topics Concern    None   Social History Narrative    None     Social Determinants of Health     Financial Resource Strain: Low Risk     Difficulty of Paying Living Expenses: Not hard at all   Food Insecurity: No Food Insecurity    Worried About Running Out of Food in the Last Year: Never true    Zeb of Food in the Last Year: Never true   Transportation Needs: No Transportation Needs    Lack of Transportation (Medical): No    Lack of Transportation (Non-Medical):  No Physical Activity:     Days of Exercise per Week: Not on file    Minutes of Exercise per Session: Not on file   Stress:     Feeling of Stress : Not on file   Social Connections:     Frequency of Communication with Friends and Family: Not on file    Frequency of Social Gatherings with Friends and Family: Not on file    Attends Scientology Services: Not on file    Active Member of [address] [place] [address] or Organizations: Not on file    Attends Club or Organization Meetings: Not on file    Marital Status: Not on file   Intimate Partner Violence:     Fear of Current or Ex-Partner: Not on file    Emotionally Abused: Not on file    Physically Abused: Not on file    Sexually Abused: Not on file   Housing Stability:     Unable to Pay for Housing in the Last Year: Not on file    Number of [place] in the Last Year: Not on file    Unstable Housing in the Last Year: Not on file       SCREENINGS    [place] Coma Scale  Eye Opening: Spontaneous  Best Verbal Response: Oriented  Best Motor Response: Obeys commands  Ben Coma Scale Score: 15          PHYSICAL EXAM    (up to 7 for level 4, 8 or more for level 5)     ED Triage Vitals [04/18/22 1419]   BP Temp Temp Source Pulse Resp SpO2 Height Weight   (!) 147/81 98.6 °F (37 °C) Oral 88 16 97 % 5' 3\" (1.6 m) 179 lb 9.6 oz (81.5 kg)       Physical Exam  Vitals and nursing note reviewed. Constitutional:       General: She is not in acute distress. Appearance: Normal appearance. She is well-developed. She is obese. She is not ill-appearing. HENT:      Head: Normocephalic and atraumatic. Mouth/Throat:      Mouth: Mucous membranes are moist.      Pharynx: Oropharynx is clear. Eyes:      Extraocular Movements: Extraocular movements intact. Conjunctiva/sclera: Conjunctivae normal.   Cardiovascular:      Rate and Rhythm: Normal rate and regular rhythm. Pulmonary:      Effort: Pulmonary effort is normal.      Breath sounds: Normal breath sounds.    Abdominal: General: Bowel sounds are normal.      Palpations: Abdomen is soft. Tenderness: There is no abdominal tenderness. There is no guarding or rebound. Musculoskeletal:         General: No deformity. Normal range of motion. Cervical back: Normal range of motion and neck supple. Skin:     General: Skin is warm and dry. Capillary Refill: Capillary refill takes less than 2 seconds. Neurological:      General: No focal deficit present. Mental Status: She is alert and oriented to person, place, and time. Mental status is at baseline. Cranial Nerves: No cranial nerve deficit. Psychiatric:         Thought Content: Thought content normal.         DIAGNOSTIC RESULTS     EKG: All EKG's are interpreted by the Emergency Department Physician who either signs or Co-signs this chart in the absence of a cardiologist.    RADIOLOGY:   Non-plain film images such as CT, Ultrasound and MRI are read by the radiologist. Plain radiographic images are visualized and preliminarily interpreted by the emergency physician with the below findings:    Interpretation per the Radiologist below, if available at the time of this note:    [address] [place] organ? LIVER   Final Result   HEPATIC STEATOSIS. GALLBLADDER POLYP. LABS:  Labs Reviewed   COMPREHENSIVE METABOLIC PANEL - Abnormal; Notable for the following components:       Result Value    Glucose 103 (*)     CREATININE 0.47 (*)     All other components within normal limits   URINALYSIS WITH REFLEX TO CULTURE - Abnormal; Notable for the following components:    Clarity, UA CLOUDY (*)     Glucose, Ur >=1000 (*)     All other components within normal limits   CBC WITH AUTO DIFFERENTIAL   AMYLASE   LIPASE   POC PREGNANCY UR-QUAL       All other labs were within normal range or not returned as of this dictation.     EMERGENCY DEPARTMENT COURSE and DIFFERENTIAL DIAGNOSIS/MDM:   Vitals:    Vitals:    04/18/22 1419 04/18/22 1616   BP: (!) 147/81 (!) 144/81   Pulse: 88 80   Resp: 16 16   Temp: 98.6 °F (37 °C)    TempSrc: Oral    SpO2: 97% 97%   Weight: 179 lb 9.6 oz (81.5 kg)    Height: 5' 3\" (1.6 m)        MDM  Number of Diagnoses or Management Options  Abdominal pain, right upper quadrant  Diagnosis management comments: 78-year-old female presenting with abdominal pain. Soft and nontender on exam.  Work-up unremarkable. Recommend supportive care at home, dietary info given. Patient will be discharged home in good condition. Patient has been hemodynamically stable throughout ED course and is appropriate for outpatient follow up. Patient should follow up with PCP in 2-3 days or return to ED immediately for any new or worsening symptoms. Patient is well appearing on discharge and agreeable with plan of care. Procedures    CRITICAL CARE TIME   Total Critical Care time was 0 minutes, excluding separately reportable procedures. There was a high probability of clinically significant/life threatening deterioration in the patient's condition which required my urgent intervention. FINAL IMPRESSION      1.  Abdominal pain, right upper quadrant          DISPOSITION/PLAN   DISPOSITION Decision To Discharge 04/18/2022 04:15:57 PM      (Please note that portions of this note were completed with a voice recognition program.  Efforts were made to edit the dictations but occasionally words are mis-transcribed.)    Sharmin Dumont MD (electronically signed)  Attending Emergency Physician        Sharmin Dumont MD  04/18/22 2178

## 2022-04-22 ENCOUNTER — OFFICE VISIT (OUTPATIENT)
Dept: PRIMARY CARE CLINIC | Age: 57
End: 2022-04-22
Payer: COMMERCIAL

## 2022-04-22 VITALS
TEMPERATURE: 98.1 F | HEART RATE: 105 BPM | BODY MASS INDEX: 32.04 KG/M2 | HEIGHT: 63 IN | OXYGEN SATURATION: 97 % | SYSTOLIC BLOOD PRESSURE: 108 MMHG | WEIGHT: 180.8 LBS | DIASTOLIC BLOOD PRESSURE: 64 MMHG

## 2022-04-22 DIAGNOSIS — R10.11 RIGHT UPPER QUADRANT ABDOMINAL PAIN: ICD-10-CM

## 2022-04-22 DIAGNOSIS — R73.9 HYPERGLYCEMIA: ICD-10-CM

## 2022-04-22 DIAGNOSIS — M72.2 PLANTAR FASCIITIS OF LEFT FOOT: Primary | ICD-10-CM

## 2022-04-22 PROCEDURE — G8427 DOCREV CUR MEDS BY ELIG CLIN: HCPCS | Performed by: INTERNAL MEDICINE

## 2022-04-22 PROCEDURE — 4004F PT TOBACCO SCREEN RCVD TLK: CPT | Performed by: INTERNAL MEDICINE

## 2022-04-22 PROCEDURE — G8417 CALC BMI ABV UP PARAM F/U: HCPCS | Performed by: INTERNAL MEDICINE

## 2022-04-22 PROCEDURE — 3017F COLORECTAL CA SCREEN DOC REV: CPT | Performed by: INTERNAL MEDICINE

## 2022-04-22 PROCEDURE — 99214 OFFICE O/P EST MOD 30 MIN: CPT | Performed by: INTERNAL MEDICINE

## 2022-04-22 RX ORDER — PANTOPRAZOLE SODIUM 40 MG/1
40 TABLET, DELAYED RELEASE ORAL DAILY
Qty: 30 TABLET | Refills: 5 | Status: SHIPPED | OUTPATIENT
Start: 2022-04-22 | End: 2022-10-19

## 2022-04-22 ASSESSMENT — ENCOUNTER SYMPTOMS
FACIAL SWELLING: 0
DIARRHEA: 1
CHOKING: 0
BLOOD IN STOOL: 0
ABDOMINAL DISTENTION: 0
PHOTOPHOBIA: 0
APNEA: 0
ABDOMINAL PAIN: 1
NAUSEA: 1

## 2022-04-22 NOTE — PROGRESS NOTES
Jatinder Simmons 61 y.o. female presents today with   Chief Complaint   Patient presents with    Follow-Up from Hospital     patient is following up on abdominal pain 4/18. Onset x 2 weeks. They did test on her and she wants more explaintion. Feels pain when she eats. Pain scale 6/10    Foot Problem     Onset x 2 months. Bottom of left foot \"Feels like I'm stepping on a lego\"       Foot Problem  This is a recurrent problem. The current episode started more than 1 year ago. The problem occurs daily. The problem has been waxing and waning. Associated symptoms include abdominal pain and nausea (after eating). Pertinent negatives include no chest pain, chills, fever, joint swelling or rash. Associated symptoms comments: In a.m. and after sitting for an hour. .   Abdominal Pain  This is a recurrent problem. The current episode started more than 1 year ago. The onset quality is sudden. The problem occurs daily. The problem has been waxing and waning. The pain is located in the RUQ. The pain is at a severity of 4/10. The pain is moderate. The quality of the pain is sharp and aching. Associated symptoms include diarrhea (occational, sometime soft.) and nausea (after eating). Pertinent negatives include no fever or hematuria.        Past Medical History:   Diagnosis Date    Anxiety state, unspecified     Headache(784.0)     Lumbago     surgery dr Hannah Rendon Mixed hyperlipidemia     meds 2 months    Non morbid obesity due to excess calories 2/13/2016    Primary localized osteoarthrosis, lower leg     Type II diabetes mellitus, uncontrolled ([address]     dx 1 yr     Patient Active Problem List    Diagnosis Date Noted    Other physical therapy 12/07/2020    Rib pain 04/24/2019    Right upper quadrant pain 04/17/2019    Type 2 diabetes mellitus without complication ([address] 62/75/5147    HNP (herniated nucleus pulposus), lumbar 03/07/2017    Simple obesity 02/13/2016    Mixed hyperlipidemia 10/18/2011    Generalized anxiety disorder 10/18/2011    Low back pain 10/18/2011    Osteoarthrosis 10/18/2011     Past Surgical History:   Procedure Laterality Date    APPENDECTOMY  1977    DILATION AND CURETTAGE OF UTERUS      ENDOMETRIAL ABLATION      LAMINECTOMY Right 3/27/2017    RIGHT L5-S1 MICRODISKECTOMY, 1 HR  performed by Adrian Stokes MD at [address] ARTHROSCOPY Right     [facility]    TUBAL LIGATION  1999     Family History   Problem Relation Age of Onset   To Breast Cancer Mother     Uterine Cancer Mother     Diabetes Paternal Grandmother     Uterine Cancer Other     High Cholesterol Other      Social History     Socioeconomic History    Marital status:      Spouse name: Not on file    Number of children: Not on file    Years of education: Not on file    Highest education level: Not on file   Occupational History    Not on file   Tobacco Use    Smoking status: Current Every Day Smoker     Packs/day: 0.25     Years: 38.00     Pack years: 9.50     Types: Cigarettes    Smokeless tobacco: Never Used   Vaping Use    Vaping Use: Never used   Substance and Sexual Activity    Alcohol use: Yes     Comment: rare holidays    Drug use: No    Sexual activity: Not on file   Other Topics Concern    Not on file   Social History Narrative    Not on file     Social Determinants of Health     Financial Resource Strain: Low Risk     Difficulty of Paying Living Expenses: Not hard at all   Food Insecurity: No Food Insecurity    Worried About Running Out of Food in the Last Year: Never true    Zeb of Food in the Last Year: Never true   Transportation Needs: No Transportation Needs    Lack of Transportation (Medical): No    Lack of Transportation (Non-Medical):  No   Physical Activity:     Days of Exercise per Week: Not on file    Minutes of Exercise per Session: Not on file   Stress:     Feeling of Stress : Not on file   Social Connections:     Frequency of Communication with Friends and Family: Not on file  Frequency of Social Gatherings with Friends and Family: Not on file    Attends Judaism Services: Not on file    Active Member of Clubs or Organizations: Not on file    Attends Club or Organization Meetings: Not on file    Marital Status: Not on file   Intimate Partner Violence:     Fear of Current or Ex-Partner: Not on file    Emotionally Abused: Not on file    Physically Abused: Not on file    Sexually Abused: Not on file   Housing Stability:     Unable to Pay for Housing in the Last Year: Not on file    Number of [place] in the Last Year: Not on file    Unstable Housing in the Last Year: Not on file     Allergies   Allergen Reactions    Metformin And Related      Diarrhea        Review of Systems   Constitutional: Negative for chills and fever. HENT: Negative for facial swelling and nosebleeds. Eyes: Negative for photophobia and visual disturbance. Respiratory: Negative for apnea and choking. Cardiovascular: Negative for chest pain and palpitations. Gastrointestinal: Positive for abdominal pain, diarrhea (occational, sometime soft.) and nausea (after eating). Negative for abdominal distention and blood in stool. Genitourinary: Negative for enuresis and hematuria. Musculoskeletal: Negative for gait problem and joint swelling. Skin: Negative for rash. Neurological: Negative for syncope and speech difficulty. Hematological: Does not bruise/bleed easily. Psychiatric/Behavioral: Negative for hallucinations and suicidal ideas. Vitals:    04/22/22 1413   BP: 108/64   Site: Left Upper Arm   Position: Sitting   Cuff Size: Large Adult   Pulse: 105   Temp: 98.1 °F (36.7 °C)   SpO2: 97%   Weight: 180 lb 12.8 oz (82 kg)   Height: 5' 3\" (1.6 m)       Physical Exam  Constitutional:       Appearance: She is well-developed. HENT:      Head: Normocephalic and atraumatic. Eyes:      Pupils: Pupils are equal, round, and reactive to light.    Cardiovascular:      Rate and Rhythm: Normal rate and regular rhythm. Heart sounds: Normal heart sounds. Pulmonary:      Effort: No respiratory distress. Breath sounds: Normal breath sounds. No wheezing. Abdominal:      General: Bowel sounds are normal. There is no distension. Tenderness: There is abdominal tenderness (right up quad). Musculoskeletal:         General: Normal range of motion. Cervical back: Normal range of motion and neck supple. Neurological:      Mental Status: She is oriented to person, place, and time. Cranial Nerves: No cranial nerve deficit. Psychiatric:         Mood and Affect: Mood normal.        Assessment/Plan  Anabell Jon was seen today for follow-up from hospital and foot problem. Diagnoses and all orders for this visit:    Plantar fasciitis of left foot  -     [facility], Podiatry, [place]    Hyperglycemia    Right upper quadrant abdominal pain  -     [facility], Jacinta Adams MD, General Surgery, [place]  -     pantoprazole (PROTONIX) 40 MG tablet; Take 1 tablet by mouth daily  -     H. Pylori Antigen, Stool; Future        Return in about 3 months (around 7/22/2022), or if symptoms worsen or fail to improve.     Carlton Lucero MD

## 2022-04-25 ENCOUNTER — OFFICE VISIT (OUTPATIENT)
Dept: GASTROENTEROLOGY | Age: 57
End: 2022-04-25
Payer: COMMERCIAL

## 2022-04-25 VITALS
BODY MASS INDEX: 30.65 KG/M2 | WEIGHT: 173 LBS | SYSTOLIC BLOOD PRESSURE: 112 MMHG | OXYGEN SATURATION: 96 % | DIASTOLIC BLOOD PRESSURE: 70 MMHG | HEART RATE: 92 BPM

## 2022-04-25 DIAGNOSIS — R10.9 ABDOMINAL PAIN, UNSPECIFIED ABDOMINAL LOCATION: Primary | ICD-10-CM

## 2022-04-25 PROCEDURE — 99204 OFFICE O/P NEW MOD 45 MIN: CPT | Performed by: INTERNAL MEDICINE

## 2022-04-25 PROCEDURE — G8417 CALC BMI ABV UP PARAM F/U: HCPCS | Performed by: INTERNAL MEDICINE

## 2022-04-25 PROCEDURE — G8427 DOCREV CUR MEDS BY ELIG CLIN: HCPCS | Performed by: INTERNAL MEDICINE

## 2022-04-25 PROCEDURE — 4004F PT TOBACCO SCREEN RCVD TLK: CPT | Performed by: INTERNAL MEDICINE

## 2022-04-25 PROCEDURE — 3017F COLORECTAL CA SCREEN DOC REV: CPT | Performed by: INTERNAL MEDICINE

## 2022-04-25 RX ORDER — SODIUM, POTASSIUM,MAG SULFATES 17.5-3.13G
SOLUTION, RECONSTITUTED, ORAL ORAL
Qty: 2 EACH | Refills: 0 | Status: SHIPPED | OUTPATIENT
Start: 2022-04-25

## 2022-04-25 ASSESSMENT — ENCOUNTER SYMPTOMS
SHORTNESS OF BREATH: 0
EYE PAIN: 0
COLOR CHANGE: 0
CHEST TIGHTNESS: 0
BLOOD IN STOOL: 0
RECTAL PAIN: 0
TROUBLE SWALLOWING: 0
VOMITING: 0
PHOTOPHOBIA: 0
VOICE CHANGE: 0
EYE REDNESS: 0
CONSTIPATION: 0
ABDOMINAL PAIN: 1
ABDOMINAL DISTENTION: 0
WHEEZING: 0

## 2022-04-25 NOTE — PROGRESS NOTES
Subjective:      Patient ID: Michael Carrera is a 61 y.o. female who presents today for:  Chief Complaint   Patient presents with    Abdominal Pain       HPI  This is a very pleasant 43-year-old who came in today for the evaluation management of right upper quadrant pain. Patient mentioned that she been having pain for at least 3 years. She described pain as worse after fatty meals she did see surgical team 2019 and they went ultrasound as well as HIDA scan with no conclusive findings. Patient had recent emergency room visit owing to abdominal pain. Patient had an ultrasound that showed absence of cholecystitis with normal gallbladder wall with no pericholecystic fluid. Noted nonshadowing area adherent to gallbladder wall with suspicion of gallbladder polyp. Of note patient had ultrasound at the time of initial presentation in 2019 as well as HIDA scan report were negative. Patient is scheduled to see surgical team the day following this current visit. No previous EGD. No prior colonoscopy. No change of bowel movements no diarrhea, constipation or blood in the stool. Does report intermittent diarrhea.   Patient measures symptoms are right upper quadrant pain episodic in nature mostly postprandial with no associated symptoms to intermittent nausea and diarrhea reported  Past Medical History:   Diagnosis Date    Anxiety state, unspecified     Headache(784.0)     Lumbago     surgery dr Sil Burton Mixed hyperlipidemia     meds 2 months    Non morbid obesity due to excess calories 2/13/2016    Primary localized osteoarthrosis, lower leg     Type II diabetes mellitus, uncontrolled ([address]     dx 1 yr     Past Surgical History:   Procedure Laterality Date    APPENDECTOMY  1977    DILATION AND CURETTAGE OF UTERUS      ENDOMETRIAL ABLATION      LAMINECTOMY Right 3/27/2017    RIGHT L5-S1 MICRODISKECTOMY, 1 HR  performed by Asiya Corey MD at [address] [facility] ARTHROSCOPY Right     RCR   [id number]  1999     Social History     Socioeconomic History    Marital status:      Spouse name: Not on file    Number of children: Not on file    Years of education: Not on file    Highest education level: Not on file   Occupational History    Not on file   Tobacco Use    Smoking status: Current Every Day Smoker     Packs/day: 0.25     Years: 38.00     Pack years: 9.50     Types: Cigarettes    Smokeless tobacco: Never Used   Vaping Use    Vaping Use: Never used   Substance and Sexual Activity    Alcohol use: Yes     Comment: rare holidays    Drug use: No    Sexual activity: Not on file   Other Topics Concern    Not on file   Social History Narrative    Not on file     Social Determinants of Health     Financial Resource Strain: Low Risk     Difficulty of Paying Living Expenses: Not hard at all   Food Insecurity: No Food Insecurity    Worried About Running Out of Food in the Last Year: Never true    Zeb of Food in the Last Year: Never true   Transportation Needs: No Transportation Needs    Lack of Transportation (Medical): No    Lack of Transportation (Non-Medical):  No   Physical Activity:     Days of Exercise per Week: Not on file    Minutes of Exercise per Session: Not on file   Stress:     Feeling of Stress : Not on file   Social Connections:     Frequency of Communication with Friends and Family: Not on file    Frequency of Social Gatherings with Friends and Family: Not on file    Attends [employer] Services: Not on file    Active Member of Clubs or Organizations: Not on file    Attends Club or Organization Meetings: Not on file    Marital Status: Not on file   Intimate Partner Violence:     Fear of Current or Ex-Partner: Not on file    Emotionally Abused: Not on file    Physically Abused: Not on file    Sexually Abused: Not on file   Housing Stability:     Unable to Pay for Housing in the Last Year: Not on file    Number of Places Lived in the Last Year: Not on file    Unstable Housing in the Last Year: Not on file     Family History   Problem Relation Age of Onset    Breast Cancer Mother     Uterine Cancer Mother     Diabetes Paternal Grandmother     Uterine Cancer Other     High Cholesterol Other      Allergies   Allergen Reactions    Metformin And Related      Diarrhea          Review of Systems   Constitutional: Negative for appetite change, chills, fatigue, fever and unexpected weight change. HENT: Negative for nosebleeds, tinnitus, trouble swallowing and voice change. Eyes: Negative for photophobia, pain and redness. Respiratory: Negative for chest tightness, shortness of breath and wheezing. Cardiovascular: Negative for chest pain, palpitations and leg swelling. Gastrointestinal: Positive for abdominal pain. Negative for abdominal distention, blood in stool, constipation, rectal pain and vomiting. Endocrine: Negative for polydipsia, polyphagia and polyuria. Genitourinary: Negative for difficulty urinating and hematuria. Skin: Negative for color change, pallor and rash. Neurological: Negative for dizziness, speech difficulty and headaches. Psychiatric/Behavioral: Negative for confusion and suicidal ideas. Objective:   /70 (Site: Right Upper Arm, Position: Sitting, Cuff Size: Small Adult)   Pulse 92   Wt 173 lb (78.5 kg)   SpO2 96%   BMI 30.65 kg/m²     Physical Exam  Constitutional:       General: She is not in acute distress. Appearance: She is well-developed. HENT:      Head: Normocephalic and atraumatic. Eyes:      Conjunctiva/sclera: Conjunctivae normal.      Pupils: Pupils are equal, round, and reactive to light. Cardiovascular:      Rate and Rhythm: Normal rate and regular rhythm. Heart sounds: Normal heart sounds. Pulmonary:      Effort: Pulmonary effort is normal. No respiratory distress. Breath sounds: Normal breath sounds. No wheezing or rales.    Abdominal:      General: Bowel sounds are normal. There is no distension. Palpations: Abdomen is soft. Abdomen is not rigid. There is no hepatomegaly, splenomegaly or mass. Tenderness: There is no abdominal tenderness. There is no guarding or rebound. Musculoskeletal:         General: No tenderness or deformity. Normal range of motion. Cervical back: Neck supple. Skin:     Coloration: Skin is not pale. Findings: No erythema or rash. Neurological:      Mental Status: She is alert and oriented to person, place, and time. Laboratory, Pathology, Radiology reviewed in detail with relevantimportant investigations summarized below:  Lab Results   Component Value Date    WBC 10.2 04/18/2022    WBC 8.4 04/03/2022    WBC 10.1 04/16/2019    WBC 8.9 07/28/2017    WBC 9.1 03/21/2017    HGB 15.4 04/18/2022    HGB 15.3 04/03/2022    HGB 15.6 04/16/2019    HGB 14.5 07/28/2017    HGB 15.0 03/21/2017    HCT 46.3 04/18/2022    HCT 45.7 04/03/2022    HCT 45.9 04/16/2019    HCT 43.7 07/28/2017    HCT 44.4 03/21/2017    MCV 88.8 04/18/2022    MCV 88.8 04/03/2022    MCV 90.0 04/16/2019    MCV 88.7 07/28/2017    MCV 87.9 03/21/2017     04/18/2022     04/03/2022     04/16/2019     07/28/2017     03/21/2017    . Lab Results   Component Value Date    ALT 23 04/18/2022    ALT 23 04/03/2022    ALT 16 04/16/2019    AST 16 04/18/2022    AST 16 04/03/2022    AST 16 04/16/2019    ALKPHOS 100 04/18/2022    ALKPHOS 118 04/03/2022    ALKPHOS 97 04/16/2019    BILITOT 0.3 04/18/2022    BILITOT 0.3 04/03/2022    BILITOT <0.2 04/16/2019       US ABDOMEN LIMITED Specify organ? LIVER    Result Date: 4/18/2022  EXAMINATION: US ABDOMEN LIMITED CLINICAL HISTORY: RIGHT UPPER QUADRANT PAIN FOR 2 WEEKS RADIATING TO BACK. COMPARISONS: ULTRASOUND ABDOMINAL APRIL 16, 2019. CT ABDOMEN PELVIS APRIL 16 2019. HIATUS SONOGRAPHY APRIL 19, 2019. FINDINGS: Liver is normal in size and shape with decreased echogenicity.  Focal fatty sparing identified adjacent the gallbladder. No intrahepatic and no extrahepatic ductal dilatation. Common duct measures 6 mm. Color flow without anomaly. Gallbladder again identified containing a 1.1 mm focal nonshadowing area of increased echogenicity adherent to gallbladder wall. No para cholecystic fluid. No gallbladder wall thickening. Pancreas obscured by overlying bowel gas. HEPATIC STEATOSIS. GALLBLADDER POLYP. No results found for: IRON, TIBC, FERRITIN  Lab Results   Component Value Date    INR 0.9 03/21/2017    INR 0.9 07/02/2015    INR 0.9 04/03/2014    INR 1.0 12/08/2013     No components found for: ACUTEHEPATITISSCREEN  No components found for: CELIACPANEL  No components found for: STOOLCULTURE, C.DIFF, STOOLOVAPARASITE, STOOLLEUCOCYTE        Assessment:      1. Right upper quadrant pain:  Longstanding and intermittent symptoms for at least 3 to 4 years. Patient had previous evaluation in 2019 including surgical assessment. Patient had ultrasound and HIDA scan in 2019 with no gallbladder findings to explain the reported right upper quadrant pain  Patient endorses RUQ intermittent in nature mainly postprandial after fatty meals. Patient had recent emergency room visit and ultrasound with questionable gallbladder polyp otherwise no gallbladder wall thickening or pericholecystic fluid. No cholelithiasis noted.   Patient was referred to surgical team for reassessment for gallbladder disease  At this time, will proceed with upper endoscopy to assess for any referred pain given the NSAIDs use,we also proceed colonoscopy for further evaluation  No prior colonoscopy reported  No significant associated medical conditions though patient reports intermittent nausea and diarrhea but none at this time  Patient was prescribed PPI however has not started pantoprazole to assess response  2- Chronic liver disease staging / NAFL:  No clinical or lab data suggestive of advanced liver disease   Noted steatosis with normal transaminases  3-Associated medical conditions include but not limited diabetes mellitus, hypertension, dyslipidemia, anxiety    Return in about 6 weeks (around 6/6/2022) for Post procedure results discussion, further management.       Denise Wick MD

## 2022-04-26 ENCOUNTER — OFFICE VISIT (OUTPATIENT)
Dept: SURGERY | Age: 57
End: 2022-04-26
Payer: COMMERCIAL

## 2022-04-26 VITALS
BODY MASS INDEX: 30.65 KG/M2 | TEMPERATURE: 97.2 F | HEART RATE: 82 BPM | WEIGHT: 173 LBS | OXYGEN SATURATION: 98 % | HEIGHT: 63 IN

## 2022-04-26 DIAGNOSIS — K81.1 CHOLECYSTITIS, CHRONIC: Primary | ICD-10-CM

## 2022-04-26 PROCEDURE — 99203 OFFICE O/P NEW LOW 30 MIN: CPT | Performed by: COLON & RECTAL SURGERY

## 2022-04-26 PROCEDURE — G8417 CALC BMI ABV UP PARAM F/U: HCPCS | Performed by: COLON & RECTAL SURGERY

## 2022-04-26 PROCEDURE — 3017F COLORECTAL CA SCREEN DOC REV: CPT | Performed by: COLON & RECTAL SURGERY

## 2022-04-26 PROCEDURE — G8427 DOCREV CUR MEDS BY ELIG CLIN: HCPCS | Performed by: COLON & RECTAL SURGERY

## 2022-04-26 PROCEDURE — 4004F PT TOBACCO SCREEN RCVD TLK: CPT | Performed by: COLON & RECTAL SURGERY

## 2022-04-26 ASSESSMENT — ENCOUNTER SYMPTOMS
ABDOMINAL PAIN: 1
SHORTNESS OF BREATH: 0
CHEST TIGHTNESS: 0
COLOR CHANGE: 0
VOMITING: 0
DIARRHEA: 0
CONSTIPATION: 0

## 2022-04-26 NOTE — PROGRESS NOTES
Subjective:      Patient ID: Karyn Lee is a 61 y.o. female who presents for:  Chief Complaint   Patient presents with    Abdominal Pain       This is a 79-year-old female whose had problems with right upper quadrant pain over the past 3 years. She was seen by another surgeon at that time and a HIDA scan was unremarkable. She had a recent right upper quadrant ultrasound which showed gallbladder polyp. She has pain in the right upper quadrant on eating. Food is especially fatty that causes recurrent pain. She had a previous appendectomy.     I reviewed her ultrasound as well as her HIDA scan from 3 years ago      Past Medical History:   Diagnosis Date    Anxiety state, unspecified     Headache(784.0)     Lumbago     surgery dr Bettie Interiano Mixed hyperlipidemia     meds 2 months    Non morbid obesity due to excess calories 2/13/2016    Primary localized osteoarthrosis, lower leg     Type II diabetes mellitus, uncontrolled ([address]     dx 1 yr     Past Surgical History:   Procedure Laterality Date    APPENDECTOMY  1977    DILATION AND CURETTAGE OF UTERUS      ENDOMETRIAL ABLATION      LAMINECTOMY Right 3/27/2017    RIGHT L5-S1 MICRODISKECTOMY, 1 HR  performed by Evita Gracia MD at [address] [facility] ARTHROSCOPY Right     RCR    TUBAL LIGATION  1999     Social History     Socioeconomic History    Marital status:      Spouse name: Not on file    Number of children: Not on file    Years of education: Not on file    Highest education level: Not on file   Occupational History    Not on file   Tobacco Use    Smoking status: Current Every Day Smoker     Packs/day: 0.25     Years: 38.00     Pack years: 9.50     Types: Cigarettes    Smokeless tobacco: Never Used   Vaping Use    Vaping Use: Never used   Substance and Sexual Activity    Alcohol use: Yes     Comment: rare holidays    Drug use: No    Sexual activity: Not on file   Other Topics Concern    Not on file   Social History Narrative    Not on file     Social Determinants of Health     Financial Resource Strain: Low Risk     Difficulty of Paying Living Expenses: Not hard at all   Food Insecurity: No Food Insecurity    Worried About Running Out of Food in the Last Year: Never true    [address] in the Last Year: Never true   Transportation Needs: No Transportation Needs    Lack of Transportation (Medical): No    Lack of Transportation (Non-Medical): No   Physical Activity:     Days of Exercise per Week: Not on file    Minutes of Exercise per Session: Not on file   Stress:     Feeling of Stress : Not on file   Social Connections:     Frequency of Communication with Friends and Family: Not on file    Frequency of Social Gatherings with Friends and Family: Not on file    Attends Buddhist Services: Not on file    Active Member of [address] [place] [address] [employer] or Organizations: Not on file    Attends Club or Organization Meetings: Not on file    Marital Status: Not on file   Intimate Partner Violence:     Fear of Current or Ex-Partner: Not on file    Emotionally Abused: Not on file    Physically Abused: Not on file    Sexually Abused: Not on file   Housing Stability:     Unable to Pay for Housing in the Last Year: Not on file    Number of [place] in the Last Year: Not on file    Unstable Housing in the Last Year: Not on file     Family History   Problem Relation Age of Onset    Breast Cancer Mother     Uterine Cancer Mother     Diabetes Paternal Grandmother     Uterine Cancer Other     High Cholesterol Other      Allergies:  Metformin and related    Review of Systems   Constitutional: Positive for appetite change. Negative for activity change, fatigue, fever and unexpected weight change. HENT: Negative for congestion. Respiratory: Negative for chest tightness and shortness of breath. Cardiovascular: Negative for chest pain. Gastrointestinal: Positive for abdominal pain.  Negative for constipation, diarrhea and vomiting. Genitourinary: Negative for difficulty urinating. Musculoskeletal: Negative for arthralgias. Skin: Negative for color change. Neurological: Negative for dizziness and headaches. Hematological: Does not bruise/bleed easily. Psychiatric/Behavioral: Negative for agitation. Objective:    Pulse 82   Temp 97.2 °F (36.2 °C) (Temporal)   Ht 5' 3\" (1.6 m)   Wt 173 lb (78.5 kg)   SpO2 98%   BMI 30.65 kg/m²     Physical Exam  Constitutional:       Appearance: She is well-developed. HENT:      Head: Normocephalic and atraumatic. Eyes:      Pupils: Pupils are equal, round, and reactive to light. Cardiovascular:      Rate and Rhythm: Normal rate and regular rhythm. Heart sounds: Normal heart sounds. Pulmonary:      Effort: Pulmonary effort is normal. No respiratory distress. Breath sounds: Normal breath sounds. No wheezing. Abdominal:      General: There is no distension. Palpations: There is no mass. Tenderness: There is abdominal tenderness. There is no guarding or rebound. Comments: No abdominal wall hernias    Right upper quadrant tenderness on palpation. Musculoskeletal:         General: Normal range of motion. Cervical back: Normal range of motion and neck supple. Skin:     General: Skin is warm and dry. Coloration: Skin is not pale. Findings: No erythema or rash. Neurological:      Mental Status: She is alert and oriented to person, place, and time. Psychiatric:         Behavior: Behavior normal.         Judgment: Judgment normal.              Assessment/Plan:          Diagnosis Orders   1. Cholecystitis, chronic       Using anatomic diagrams, I described the risks and benefits of laparoscopic, possible open cholecystectomy. Risks of the procedure including infection, bleeding, damage to the common bile duct, bile leak reoperation and failure to relieve symptoms were all addressed. Nonoperative alternatives were given.     Despite the risks, she wishes to proceed with laparoscopic cholecystectomy with cholangiogram.  Consent obtained. All questions answered. Please note this report has beenpartially produced using speech recognition software and may cause contain errors related to that system including grammar, punctuation and spelling as well as words and phrases that may seem inappropriate.  If there arequestions or concerns please feel free to contact me to clarify

## 2022-04-27 ENCOUNTER — ANESTHESIA EVENT (OUTPATIENT)
Dept: OPERATING ROOM | Age: 57
End: 2022-04-27
Payer: COMMERCIAL

## 2022-04-27 ENCOUNTER — HOSPITAL ENCOUNTER (OUTPATIENT)
Dept: GENERAL RADIOLOGY | Age: 57
Setting detail: OUTPATIENT SURGERY
Discharge: HOME | End: 2022-04-29
Attending: COLON & RECTAL SURGERY
Payer: COMMERCIAL

## 2022-04-27 ENCOUNTER — HOSPITAL ENCOUNTER (OUTPATIENT)
Age: 57
Setting detail: OUTPATIENT SURGERY
Discharge: HOME | End: 2022-04-27
Attending: COLON & RECTAL SURGERY | Admitting: COLON & RECTAL SURGERY
Payer: COMMERCIAL

## 2022-04-27 ENCOUNTER — ANESTHESIA (OUTPATIENT)
Dept: OPERATING ROOM | Age: 57
End: 2022-04-27
Payer: COMMERCIAL

## 2022-04-27 VITALS
BODY MASS INDEX: 28.82 KG/M2 | OXYGEN SATURATION: 95 % | TEMPERATURE: 98.2 F | WEIGHT: 173 LBS | HEIGHT: 65 IN | DIASTOLIC BLOOD PRESSURE: 70 MMHG | SYSTOLIC BLOOD PRESSURE: 158 MMHG | HEART RATE: 75 BPM | RESPIRATION RATE: 18 BRPM

## 2022-04-27 VITALS — SYSTOLIC BLOOD PRESSURE: 184 MMHG | DIASTOLIC BLOOD PRESSURE: 111 MMHG | OXYGEN SATURATION: 95 %

## 2022-04-27 DIAGNOSIS — R52 PAIN: ICD-10-CM

## 2022-04-27 DIAGNOSIS — R10.11 RIGHT UPPER QUADRANT PAIN: Primary | ICD-10-CM

## 2022-04-27 LAB
GLUCOSE BLD-MCNC: 132 MG/DL (ref 70–99)
HCG, URINE, POC: NEGATIVE
Lab: NORMAL
NEGATIVE QC PASS/FAIL: NORMAL
PERFORMED ON: ABNORMAL
POSITIVE QC PASS/FAIL: NORMAL

## 2022-04-27 PROCEDURE — 3600000004 HC SURGERY LEVEL 4 BASE: Performed by: COLON & RECTAL SURGERY

## 2022-04-27 PROCEDURE — C1758 CATHETER, URETERAL: HCPCS | Performed by: COLON & RECTAL SURGERY

## 2022-04-27 PROCEDURE — 7100000000 HC PACU RECOVERY - FIRST 15 MIN: Performed by: COLON & RECTAL SURGERY

## 2022-04-27 PROCEDURE — 6370000000 HC RX 637 (ALT 250 FOR IP): Performed by: COLON & RECTAL SURGERY

## 2022-04-27 PROCEDURE — 6360000004 HC RX CONTRAST MEDICATION: Performed by: COLON & RECTAL SURGERY

## 2022-04-27 PROCEDURE — 2580000003 HC RX 258: Performed by: ANESTHESIOLOGY

## 2022-04-27 PROCEDURE — 6370000000 HC RX 637 (ALT 250 FOR IP)

## 2022-04-27 PROCEDURE — 3600000014 HC SURGERY LEVEL 4 ADDTL 15MIN: Performed by: COLON & RECTAL SURGERY

## 2022-04-27 PROCEDURE — 3700000001 HC ADD 15 MINUTES (ANESTHESIA): Performed by: COLON & RECTAL SURGERY

## 2022-04-27 PROCEDURE — A4217 STERILE WATER/SALINE, 500 ML: HCPCS | Performed by: COLON & RECTAL SURGERY

## 2022-04-27 PROCEDURE — 47563 LAPARO CHOLECYSTECTOMY/GRAPH: CPT | Performed by: COLON & RECTAL SURGERY

## 2022-04-27 PROCEDURE — 7100000011 HC PHASE II RECOVERY - ADDTL 15 MIN: Performed by: COLON & RECTAL SURGERY

## 2022-04-27 PROCEDURE — 6360000002 HC RX W HCPCS: Performed by: ANESTHESIOLOGY

## 2022-04-27 PROCEDURE — 3700000000 HC ANESTHESIA ATTENDED CARE: Performed by: COLON & RECTAL SURGERY

## 2022-04-27 PROCEDURE — 7100000010 HC PHASE II RECOVERY - FIRST 15 MIN: Performed by: COLON & RECTAL SURGERY

## 2022-04-27 PROCEDURE — 6370000000 HC RX 637 (ALT 250 FOR IP): Performed by: ANESTHESIOLOGY

## 2022-04-27 PROCEDURE — 2580000003 HC RX 258: Performed by: COLON & RECTAL SURGERY

## 2022-04-27 PROCEDURE — 6360000002 HC RX W HCPCS: Performed by: REGISTERED NURSE

## 2022-04-27 PROCEDURE — 74300 X-RAY BILE DUCTS/PANCREAS: CPT

## 2022-04-27 PROCEDURE — 2500000003 HC RX 250 WO HCPCS: Performed by: ANESTHESIOLOGY

## 2022-04-27 PROCEDURE — 2709999900 HC NON-CHARGEABLE SUPPLY: Performed by: COLON & RECTAL SURGERY

## 2022-04-27 PROCEDURE — 88304 TISSUE EXAM BY PATHOLOGIST: CPT

## 2022-04-27 PROCEDURE — 2500000003 HC RX 250 WO HCPCS: Performed by: REGISTERED NURSE

## 2022-04-27 PROCEDURE — 6360000002 HC RX W HCPCS: Performed by: COLON & RECTAL SURGERY

## 2022-04-27 PROCEDURE — 7100000001 HC PACU RECOVERY - ADDTL 15 MIN: Performed by: COLON & RECTAL SURGERY

## 2022-04-27 PROCEDURE — 64486 TAP BLOCK UNIL BY INJECTION: CPT | Performed by: ANESTHESIOLOGY

## 2022-04-27 RX ORDER — OXYCODONE HYDROCHLORIDE 5 MG/1
10 TABLET ORAL PRN
Status: COMPLETED | OUTPATIENT
Start: 2022-04-27 | End: 2022-04-27

## 2022-04-27 RX ORDER — SODIUM CHLORIDE, SODIUM LACTATE, POTASSIUM CHLORIDE, CALCIUM CHLORIDE 600; 310; 30; 20 MG/100ML; MG/100ML; MG/100ML; MG/100ML
INJECTION, SOLUTION INTRAVENOUS CONTINUOUS
Status: DISCONTINUED | OUTPATIENT
Start: 2022-04-27 | End: 2022-04-27 | Stop reason: HOSPADM

## 2022-04-27 RX ORDER — MAGNESIUM HYDROXIDE 1200 MG/15ML
LIQUID ORAL CONTINUOUS PRN
Status: DISCONTINUED | OUTPATIENT
Start: 2022-04-27 | End: 2022-04-27 | Stop reason: HOSPADM

## 2022-04-27 RX ORDER — OXYCODONE HYDROCHLORIDE 5 MG/1
5 TABLET ORAL PRN
Status: COMPLETED | OUTPATIENT
Start: 2022-04-27 | End: 2022-04-27

## 2022-04-27 RX ORDER — FENTANYL CITRATE 50 UG/ML
25 INJECTION, SOLUTION INTRAMUSCULAR; INTRAVENOUS EVERY 10 MIN PRN
Status: DISCONTINUED | OUTPATIENT
Start: 2022-04-27 | End: 2022-04-27 | Stop reason: HOSPADM

## 2022-04-27 RX ORDER — MIDAZOLAM HYDROCHLORIDE 1 MG/ML
INJECTION INTRAMUSCULAR; INTRAVENOUS PRN
Status: DISCONTINUED | OUTPATIENT
Start: 2022-04-27 | End: 2022-04-27 | Stop reason: SDUPTHER

## 2022-04-27 RX ORDER — WOUND DRESSING ADHESIVE - LIQUID
LIQUID MISCELLANEOUS PRN
Status: DISCONTINUED | OUTPATIENT
Start: 2022-04-27 | End: 2022-04-27 | Stop reason: HOSPADM

## 2022-04-27 RX ORDER — SODIUM CHLORIDE 0.9 % (FLUSH) 0.9 %
5-40 SYRINGE (ML) INJECTION EVERY 12 HOURS SCHEDULED
Status: DISCONTINUED | OUTPATIENT
Start: 2022-04-27 | End: 2022-04-27 | Stop reason: HOSPADM

## 2022-04-27 RX ORDER — PROPOFOL 10 MG/ML
INJECTION, EMULSION INTRAVENOUS PRN
Status: DISCONTINUED | OUTPATIENT
Start: 2022-04-27 | End: 2022-04-27 | Stop reason: SDUPTHER

## 2022-04-27 RX ORDER — BUPIVACAINE HYDROCHLORIDE 2.5 MG/ML
INJECTION, SOLUTION EPIDURAL; INFILTRATION; INTRACAUDAL PRN
Status: DISCONTINUED | OUTPATIENT
Start: 2022-04-27 | End: 2022-04-27 | Stop reason: SDUPTHER

## 2022-04-27 RX ORDER — MEPERIDINE HYDROCHLORIDE 25 MG/ML
12.5 INJECTION INTRAMUSCULAR; INTRAVENOUS; SUBCUTANEOUS
Status: COMPLETED | OUTPATIENT
Start: 2022-04-27 | End: 2022-04-27

## 2022-04-27 RX ORDER — SODIUM CHLORIDE 9 MG/ML
25 INJECTION, SOLUTION INTRAVENOUS PRN
Status: DISCONTINUED | OUTPATIENT
Start: 2022-04-27 | End: 2022-04-27 | Stop reason: HOSPADM

## 2022-04-27 RX ORDER — DIPHENHYDRAMINE HYDROCHLORIDE 50 MG/ML
12.5 INJECTION INTRAMUSCULAR; INTRAVENOUS
Status: DISCONTINUED | OUTPATIENT
Start: 2022-04-27 | End: 2022-04-27 | Stop reason: HOSPADM

## 2022-04-27 RX ORDER — OXYCODONE HYDROCHLORIDE AND ACETAMINOPHEN 5; 325 MG/1; MG/1
1 TABLET ORAL EVERY 6 HOURS PRN
Qty: 12 TABLET | Refills: 0 | Status: SHIPPED | OUTPATIENT
Start: 2022-04-27 | End: 2022-05-20 | Stop reason: SDUPTHER

## 2022-04-27 RX ORDER — ONDANSETRON 2 MG/ML
4 INJECTION INTRAMUSCULAR; INTRAVENOUS
Status: DISCONTINUED | OUTPATIENT
Start: 2022-04-27 | End: 2022-04-27 | Stop reason: HOSPADM

## 2022-04-27 RX ORDER — ROCURONIUM BROMIDE 10 MG/ML
INJECTION, SOLUTION INTRAVENOUS PRN
Status: DISCONTINUED | OUTPATIENT
Start: 2022-04-27 | End: 2022-04-27 | Stop reason: SDUPTHER

## 2022-04-27 RX ORDER — METOCLOPRAMIDE HYDROCHLORIDE 5 MG/ML
10 INJECTION INTRAMUSCULAR; INTRAVENOUS
Status: DISCONTINUED | OUTPATIENT
Start: 2022-04-27 | End: 2022-04-27 | Stop reason: HOSPADM

## 2022-04-27 RX ORDER — SODIUM CHLORIDE 0.9 % (FLUSH) 0.9 %
5-40 SYRINGE (ML) INJECTION PRN
Status: DISCONTINUED | OUTPATIENT
Start: 2022-04-27 | End: 2022-04-27 | Stop reason: HOSPADM

## 2022-04-27 RX ADMIN — BUPIVACAINE HYDROCHLORIDE 30 ML: 2.5 INJECTION, SOLUTION EPIDURAL; INFILTRATION; INTRACAUDAL; PERINEURAL at 12:47

## 2022-04-27 RX ADMIN — CEFAZOLIN 2000 MG: 10 INJECTION, POWDER, FOR SOLUTION INTRAVENOUS at 14:24

## 2022-04-27 RX ADMIN — PROPOFOL 200 MG: 10 INJECTION, EMULSION INTRAVENOUS at 14:17

## 2022-04-27 RX ADMIN — MEPERIDINE HYDROCHLORIDE 12.5 MG: 25 INJECTION, SOLUTION INTRAMUSCULAR; INTRAVENOUS; SUBCUTANEOUS at 16:05

## 2022-04-27 RX ADMIN — SODIUM CHLORIDE, POTASSIUM CHLORIDE, SODIUM LACTATE AND CALCIUM CHLORIDE: 600; 310; 30; 20 INJECTION, SOLUTION INTRAVENOUS at 14:24

## 2022-04-27 RX ADMIN — ROCURONIUM BROMIDE 50 MG: 10 INJECTION INTRAVENOUS at 14:17

## 2022-04-27 RX ADMIN — MIDAZOLAM HYDROCHLORIDE 2 MG: 1 INJECTION, SOLUTION INTRAMUSCULAR; INTRAVENOUS at 12:43

## 2022-04-27 RX ADMIN — OXYCODONE 10 MG: 5 TABLET ORAL at 16:09

## 2022-04-27 ASSESSMENT — PULMONARY FUNCTION TESTS
PIF_VALUE: 32
PIF_VALUE: 31
PIF_VALUE: 23
PIF_VALUE: 23
PIF_VALUE: 27
PIF_VALUE: 28
PIF_VALUE: 5
PIF_VALUE: 31
PIF_VALUE: 22
PIF_VALUE: 1
PIF_VALUE: 31
PIF_VALUE: 0
PIF_VALUE: 32
PIF_VALUE: 23
PIF_VALUE: 29
PIF_VALUE: 30
PIF_VALUE: 24
PIF_VALUE: 31
PIF_VALUE: 30
PIF_VALUE: 23
PIF_VALUE: 30
PIF_VALUE: 15
PIF_VALUE: 23
PIF_VALUE: 31
PIF_VALUE: 9
PIF_VALUE: 30
PIF_VALUE: 15
PIF_VALUE: 15
PIF_VALUE: 31
PIF_VALUE: 33
PIF_VALUE: 25
PIF_VALUE: 15
PIF_VALUE: 0
PIF_VALUE: 0
PIF_VALUE: 3
PIF_VALUE: 23
PIF_VALUE: 22
PIF_VALUE: 23
PIF_VALUE: 22
PIF_VALUE: 31
PIF_VALUE: 29
PIF_VALUE: 23
PIF_VALUE: 23
PIF_VALUE: 15
PIF_VALUE: 24
PIF_VALUE: 0
PIF_VALUE: 2
PIF_VALUE: 15
PIF_VALUE: 2
PIF_VALUE: 33
PIF_VALUE: 24
PIF_VALUE: 28
PIF_VALUE: 30
PIF_VALUE: 2
PIF_VALUE: 23
PIF_VALUE: 15
PIF_VALUE: 23
PIF_VALUE: 31
PIF_VALUE: 2
PIF_VALUE: 32
PIF_VALUE: 31
PIF_VALUE: 0
PIF_VALUE: 23
PIF_VALUE: 23
PIF_VALUE: 15
PIF_VALUE: 0
PIF_VALUE: 24
PIF_VALUE: 31
PIF_VALUE: 0
PIF_VALUE: 0
PIF_VALUE: 30
PIF_VALUE: 23
PIF_VALUE: 10
PIF_VALUE: 32
PIF_VALUE: 22
PIF_VALUE: 23
PIF_VALUE: 2

## 2022-04-27 ASSESSMENT — PAIN DESCRIPTION - DESCRIPTORS
DESCRIPTORS: PRESSURE
DESCRIPTORS: ACHING
DESCRIPTORS: PRESSURE

## 2022-04-27 ASSESSMENT — PAIN SCALES - GENERAL
PAINLEVEL_OUTOF10: 5

## 2022-04-27 ASSESSMENT — PAIN - FUNCTIONAL ASSESSMENT: PAIN_FUNCTIONAL_ASSESSMENT: 0-10

## 2022-04-27 ASSESSMENT — PAIN DESCRIPTION - LOCATION
LOCATION: ABDOMEN
LOCATION: ABDOMEN

## 2022-04-27 ASSESSMENT — LIFESTYLE VARIABLES: SMOKING_STATUS: 1

## 2022-04-27 NOTE — BRIEF OP NOTE
Brief Postoperative Note      Patient: Yue Castellano  YOB: 1965  MRN: [id number]    Date of Procedure: 4/27/2022    Pre-Op Diagnosis: CHRONIC CHOLECYSTITIS    Post-Op Diagnosis: Same       Procedure(s):  LAPAROSCOPIC CHOLECYSTECTOMY INTRAOPERATIVE CHOLANGIOGRAM    Surgeon(s):  Caridad Pal MD    Assistant:  First Assistant: Jason Cisneros    Anesthesia: General    Estimated Blood Loss (mL): 20    Complications: None    Specimens:   ID Type Source Tests Collected by Time Destination   A : GALLBLADDER Tissue Gallbladder SURGICAL PATHOLOGY Caridad Pal MD 4/27/2022 1435        Implants:  * No implants in log *      Drains: * No LDAs found *    Findings: Chronic cholecystitis, intraoperative cholangiogram unremarkable    Electronically signed by Rm Rodríguez MD on 4/27/2022 at 3:21 PM

## 2022-04-27 NOTE — ANESTHESIA PROCEDURE NOTES
Peripheral Block    Patient location during procedure: pre-op  Start time: 4/27/2022 12:45 PM  End time: 4/27/2022 12:50 PM  Staffing  Performed: anesthesiologist   Anesthesiologist: Alison Chua MD  Preanesthetic Checklist  Completed: patient identified, IV checked, site marked, risks and benefits discussed, surgical consent, monitors and equipment checked, pre-op evaluation, timeout performed, anesthesia consent given, oxygen available and patient being monitored  Peripheral Block  Patient position: supine  Prep: ChloraPrep  Patient monitoring: cardiac monitor, continuous pulse ox, frequent blood pressure checks and IV access  Block type: TAP  Laterality: right  Injection technique: single-shot  Guidance: ultrasound guided  Local infiltration: bupivacaine  Infiltration strength: 0.25 %  Dose: 30 mL  Provider prep: mask and sterile gloves (Sterile probe cover)  Local infiltration: bupivacaine  Needle  Needle type: combined needle/nerve stimulator   Needle gauge: 22 G  Needle length: 5 cm  Needle localization: anatomical landmarks and ultrasound guidance  Assessment  Injection assessment: negative aspiration for heme, no paresthesia on injection and local visualized surrounding nerve on ultrasound  Paresthesia pain: immediately resolved  Slow fractionated injection: yes  Hemodynamics: stable  Additional Notes  Ultrasound image printed and saved in patient chart.     Sterile probe cover used    Reason for block: post-op pain management and at surgeon's request

## 2022-04-27 NOTE — ANESTHESIA PRE PROCEDURE
Department of Anesthesiology  Preprocedure Note       Name:  Steffani Meigs   Age:  61 y.o.  :  1965                                          MRN:  [id number]         Date:  2022      Surgeon: Ron Cuevas):  Quan Lang MD    Procedure: Procedure(s):  LAPAROSCOPIC CHOLECYSTECTOMY INTRAOPERATIVE CHOLANGIOGRAM (PAT ON ADMIT) PT WILL ARRIVE AT 10:00 AM    Medications prior to admission:   Prior to Admission medications    Medication Sig Start Date End Date Taking?  Authorizing Provider   Na Sulfate-K Sulfate-Mg Sulf (SUPREP) 17.5-3.13-1.6 GM/177ML SOLN solution As directed  Patient not taking: Reported on 2022   Colin Mcdowell MD   pantoprazole (PROTONIX) 40 MG tablet Take 1 tablet by mouth daily 4/22/22 10/19/22  Berta Kingston MD   fluticasone [facility]) 50 MCG/ACT nasal spray 1 spray by Nasal route daily 3/31/22   TANIKA Quintanilla CNP   loratadine (CLARITIN) 10 MG tablet Take 1 tablet by mouth daily 3/31/22   TANIKA Quintanilla CNP   Probiotic Acidophilus [facility]) TABS Take 1 tablet by mouth daily 3/31/22 4/30/22  TANIKA Quintanilla CNP   fluconazole (DIFLUCAN) 150 MG tablet Take 1 tablet by mouth daily Take 1 tablet by mouth and then 72 hours later take 1 tablet by mouth. 3/31/22   TANIKA Quintanilla CNP   Dulaglutide (TRULICITY) 1.76 QV/5.6PG SOPN Inject 0.75 mg into the skin once a week 12/3/21   Aguila Mack MD   dapagliflozin (FARXIGA) 10 MG tablet Take 1 tablet by mouth every morning Lot #:[id number], Exp: 3/23 Disp: 2 boxes 12/3/21   Aguila Mack MD   atorvastatin (LIPITOR) 20 MG tablet TAKE 1 TABLET DAILY 21   Aguila Mack MD   traZODone (DESYREL) 150 MG tablet Take 1 tablet by mouth nightly 21   Aguila Mack MD   ibuprofen (ADVIL;MOTRIN) 800 MG tablet TK 1 T PO  BID PRN P 6/15/21   Aguila Mack MD   ibuprofen (ADVIL;MOTRIN) 800 MG tablet TAKE 1 TABLET BY MOUTH TWO TIMES A DAY AS NEEDED 21   Berta Kingston MD   blood glucose test strips (FREESTYLE LITE) strip Pt test 3x daily dx E11.65 5/8/20   Toma Garza MD   FreeStyle Lancets MISC Test 3x daily dx E11.65 5/8/20   Toma Garza MD   glimepiride (AMARYL) 2 MG tablet TAKE 1 TABLET TWICE A DAY  Patient taking differently: Take 2 mg by mouth 2 times daily as needed TAKE 1 TABLET TWICE A DAY 2/22/20   Toma Garza MD   ondansetron (ZOFRAN ODT) 4 MG disintegrating tablet Take 1-2 tablets by mouth every 12 hours as needed for Nausea May Sub regular tablet (non-ODT) if insurance does not cover ODT. Patient not taking: Reported on 8/25/2021 4/17/19   Yari Argueta PA-C   Insulin Pen Needle 31G X 6 MM MISC Use 1 daily 1/2/19   Toma Garza MD   Alcohol Swabs 70 % PADS USE TWICE DAILY 12/22/16   Toma Garza MD   Blood Glucose Monitoring Suppl (FREESTYLE LITE) ALVARO 1 Device by Does not apply route daily as needed 3/28/16   Toma Garza MD       Current medications:    Current Facility-Administered Medications   Medication Dose Route Frequency Provider Last Rate Last Admin    ceFAZolin (ANCEF) 2000 mg in dextrose 5 % 100 mL IVPB  2,000 mg IntraVENous On Call to Nallely Lanza MD        lactated ringers infusion   IntraVENous Continuous Jeaneth Gaston MD           Allergies:     Allergies   Allergen Reactions    Metformin And Related      Diarrhea        Problem List:    Patient Active Problem List   Diagnosis Code    Mixed hyperlipidemia E78.2    Generalized anxiety disorder F41.1    Low back pain M54.50    Osteoarthrosis M19.90    Simple obesity E66.9    HNP (herniated nucleus pulposus), lumbar M51.26    Type 2 diabetes mellitus without complication ([facility] [address] Q64.6    Right upper quadrant pain R10.11    Rib pain R07.81    Other physical therapy [id number]       Past Medical History:        Diagnosis Date    Anxiety state, unspecified     Headache(784.0)     Lumbago     surgery dr Chuck Blank Mixed hyperlipidemia     meds 2 months    Non morbid obesity due to excess calories 2/13/2016    Primary localized osteoarthrosis, lower leg     Type II diabetes mellitus, uncontrolled ([facility] [address]     dx 1 yr       Past Surgical History:        Procedure Laterality Date    APPENDECTOMY  1977    DILATION AND CURETTAGE OF UTERUS      ENDOMETRIAL ABLATION      LAMINECTOMY Right 3/27/2017    RIGHT L5-S1 MICRODISKECTOMY, 1 HR  performed by Janell Hernandez MD at [address] ARTHROSCOPY Right     RCR    TUBAL LIGATION  1999       Social History:    Social History     Tobacco Use    Smoking status: Current Every Day Smoker     Packs/day: 0.25     Years: 38.00     Pack years: 9.50     Types: Cigarettes    Smokeless tobacco: Never Used   Substance Use Topics    Alcohol use: Yes     Comment: rare holidays                                Ready to quit: Not Answered  Counseling given: Not Answered      Vital Signs (Current):   Vitals:    04/27/22 1104   BP: 121/73   Pulse: 87   Resp: 16   Temp: 97.4 °F (36.3 °C)   TempSrc: Temporal   SpO2: 98%   Weight: 173 lb (78.5 kg)   Height: 5' 5\" (1.651 m)                                              BP Readings from Last 3 Encounters:   04/27/22 121/73   04/25/22 112/70   04/22/22 108/64       NPO Status: Time of last liquid consumption: 2200                        Time of last solid consumption: 1000                        Date of last liquid consumption: 04/26/22                        Date of last solid food consumption: 04/26/22    BMI:   Wt Readings from Last 3 Encounters:   04/27/22 173 lb (78.5 kg)   04/26/22 173 lb (78.5 kg)   04/25/22 173 lb (78.5 kg)     Body mass index is 28.79 kg/m².     CBC:   Lab Results   Component Value Date    WBC 10.2 04/18/2022    RBC 5.21 04/18/2022    HGB 15.4 04/18/2022    HCT 46.3 04/18/2022    MCV 88.8 04/18/2022    RDW 14.2 04/18/2022     04/18/2022       CMP:   Lab Results   Component Value Date     04/18/2022    K 4.0 04/18/2022     04/18/2022    CO2 24 04/18/2022    BUN 13 04/18/2022    CREATININE 0.47 04/18/2022    GFRAA >60.0 04/18/2022    LABGLOM >60.0 04/18/2022    GLUCOSE 103 04/18/2022    PROT 7.3 04/18/2022    CALCIUM 9.1 04/18/2022    BILITOT 0.3 04/18/2022    ALKPHOS 100 04/18/2022    AST 16 04/18/2022    ALT 23 04/18/2022       POC Tests: No results for input(s): POCGLU, POCNA, POCK, POCCL, POCBUN, POCHEMO, POCHCT in the last 72 hours. Coags:   Lab Results   Component Value Date    PROTIME 9.9 03/21/2017    INR 0.9 03/21/2017    APTT 28.5 07/02/2015       HCG (If Applicable): No results found for: PREGTESTUR, PREGSERUM, HCG, HCGQUANT     ABGs: No results found for: PHART, PO2ART, EEI7MJM, HSZ9IHZ, BEART, L6OKQWFS     Type & Screen (If Applicable):  No results found for: LABABO, LABRH    Drug/Infectious Status (If Applicable):  No results found for: HIV, HEPCAB    COVID-19 Screening (If Applicable):   Lab Results   Component Value Date    COVID19 Not-Detected 03/31/2022    COVID19 Not Detected 08/25/2021           Anesthesia Evaluation    Airway: Mallampati: II  TM distance: >3 FB   Neck ROM: full  Mouth opening: > = 3 FB Dental: normal exam         Pulmonary: breath sounds clear to auscultation  (+) current smoker          Patient smoked on day of surgery. Cardiovascular:Negative CV ROS  Exercise tolerance: good (>4 METS),           Rhythm: regular                      Neuro/Psych:   (+) headaches:, psychiatric history:            GI/Hepatic/Renal: Neg GI/Hepatic/Renal ROS            Endo/Other:    (+) DiabetesType II DM, well controlled, , .                 Abdominal:             Vascular: negative vascular ROS. Other Findings:             Anesthesia Plan      general and regional     ASA 2     (US guided TAP block)  Induction: intravenous. MIPS: Prophylactic antiemetics administered. Anesthetic plan and risks discussed with patient. Use of blood products discussed with patient whom consented to blood products. Plan discussed with CRNA.     Attending anesthesiologist reviewed and agrees with Preprocedure content              Isidro Roberts MD   4/27/2022

## 2022-04-27 NOTE — FLOWSHEET NOTE
Patient ambulated to bathroom and voided clear yellow urine. Discharge instruction given to patient and family, verbalizes understanding with no further questions. VSS. IV out, bandaid cdi. Patient being discharged.

## 2022-04-28 NOTE — OP NOTE
[address]                      [facility], [address] [facility]                                OPERATIVE REPORT    PATIENT NAME: Ann Livingston                   :        1965  MED REC NO:   [id number]                            ROOM:  ACCOUNT NO:   [de-identified]                           ADMIT DATE: 2022  PROVIDER:     Patti Johnson MD    DATE OF PROCEDURE:  2022    PREOPERATIVE DIAGNOSIS:  Chronic cholecystitis. POSTOPERATIVE DIAGNOSIS:  Chronic cholecystitis. PROCEDURE:  1. Laparoscopic cholecystectomy. 2.  Intraoperative cholangiogram.    SURGEON:  Patti Johnson MD    ASSISTANT:  Ms. Edilia Santos. ANESTHESIA:  1. General endotracheal anesthesia. 2.  TAP block    ESTIMATED BLOOD LOSS:  20 mL. SPECIMEN:  Gallbladder. COMPLICATIONS:  None. INDICATIONS:  A 76-year-old female with a clinical history, physical  exam, and ultrasound findings consistent with chronic cholecystitis. Risks and benefits of cholecystectomy described. Risks of infection,  bleeding, damage to the common bile duct, bile leak, reoperation, and  failure to relieve symptoms all addressed. Despite these risks, she  wished to proceed. Consent obtained. OPERATIVE PROCEDURE:  She was taken to the operating room, placed in the  supine position. General endotracheal anesthesia was administered. Regional block placed preoperatively. Abdomen was prepped and draped  with a ChloraPrep-containing solution. She received Ancef  preoperatively. A time-out was taken for appropriate verification. A 5-mm infraumbilical incision was made. An optical trocar was placed  and pneumoperitoneum was established. Subxiphoid and two lateral ports  were placed under direct visualization. The gallbladder was held with the fundus as well as the neck. The  cystic duct was identified and circumferentiated. A distal clip was  placed.   A hole was made in the cystic duct using laparoscopic scissors. A cholangiogram catheter was inserted and secured. Under real time fluoroscopy, an intraoperative cholangiogram showed no  filling defects and free flow of contrast into the duodenum. The  cholangiogram catheter was removed and two secure proximal clips were  placed on the cystic duct and it was cut with scissors. The cystic  artery was clipped and cut in a similar fashion. The gallbladder was removed from the liver bed using electrocautery. It  was placed in an EndoCatch bag and brought out the subxiphoid port site  and sent to Pathology in formalin for permanent section. Excellent hemostasis was achieved and assured in the gallbladder bed. The remainder of the laparoscopic exam was otherwise unremarkable. Lap,  needle, instrument counts were correct prior to completion. The ports were all removed. The fascia of the subxiphoid port site was  closed with an interrupted 0 Vicryl suture. The skin incisions were all  closed with 4-0 Monocryl in a subcuticular fashion. Steri-Strips,  Mastisol, and Band-Aids were placed as dressing. Following closure, the lap, needle, instrument counts were all correct. The patient was extubated and taken to recovery for postop monitoring.         Chani Ramirez MD    D: 04/27/2022 15:27:09       T: 04/27/2022 15:29:48     [id number]  Job#: [id number]     Doc#: [id number]    CC:

## 2022-05-06 ENCOUNTER — OFFICE VISIT (OUTPATIENT)
Dept: SURGERY | Age: 57
End: 2022-05-06

## 2022-05-06 VITALS
OXYGEN SATURATION: 98 % | TEMPERATURE: 97.5 F | HEIGHT: 65 IN | WEIGHT: 173 LBS | HEART RATE: 90 BPM | BODY MASS INDEX: 28.82 KG/M2

## 2022-05-06 DIAGNOSIS — K81.1 CHOLECYSTITIS, CHRONIC: Primary | ICD-10-CM

## 2022-05-06 PROCEDURE — 99024 POSTOP FOLLOW-UP VISIT: CPT | Performed by: COLON & RECTAL SURGERY

## 2022-05-06 NOTE — PROGRESS NOTES
Subjective:      Patient ID: Scooby Carrillo is a 61 y.o. female who presents for:  Chief Complaint   Patient presents with    Post-Op Check       This is a 48-year-old female who is 9 days out from a laparoscopic cholecystectomy with cholangiogram.    She has been doing well. She is eating. Her bowels are working. She was constipated initially but that has resolved. Denies fever.       Past Medical History:   Diagnosis Date    Anxiety state, unspecified     Headache(784.0)     Lumbago     surgery dr John Paul Gamez Mixed hyperlipidemia     meds 2 months    Non morbid obesity due to excess calories 2/13/2016    Primary localized osteoarthrosis, lower leg     Type II diabetes mellitus, uncontrolled ([facility] [address]     dx 1 yr     Past Surgical History:   Procedure Laterality Date    APPENDECTOMY  1977    CHOLECYSTECTOMY, LAPAROSCOPIC N/A 4/27/2022    LAPAROSCOPIC CHOLECYSTECTOMY INTRAOPERATIVE CHOLANGIOGRAM performed by Flako Hines MD at [address] Right 3/27/2017    RIGHT L5-S1 MICRODISKECTOMY, 1 HR  performed by Yana Pate MD at [address] [facility] ARTHROSCOPY Right     R   [address] [facility]     Social History     Socioeconomic History    Marital status:      Spouse name: Not on file    Number of children: Not on file    Years of education: Not on file    Highest education level: Not on file   Occupational History    Not on file   Tobacco Use    Smoking status: Current Every Day Smoker     Packs/day: 0.25     Years: 38.00     Pack years: 9.50     Types: Cigarettes    Smokeless tobacco: Never Used   Vaping Use    Vaping Use: Never used   Substance and Sexual Activity    Alcohol use: Yes     Comment: rare holidays    Drug use: No    Sexual activity: Not on file   Other Topics Concern    Not on file   Social History Narrative    Not on file     Social Determinants of Health     Financial Resource Strain: Low Risk     Difficulty of Paying Living Expenses: Not hard at all   Food Insecurity: No Food Insecurity    Worried About Running Out of Food in the Last Year: Never true    Zeb of Food in the Last Year: Never true   Transportation Needs: No Transportation Needs    Lack of Transportation (Medical): No    Lack of Transportation (Non-Medical): No   Physical Activity:     Days of Exercise per Week: Not on file    Minutes of Exercise per Session: Not on file   Stress:     Feeling of Stress : Not on file   Social Connections:     Frequency of Communication with Friends and Family: Not on file    Frequency of Social Gatherings with Friends and Family: Not on file    Attends [employer] Services: Not on file    Active Member of [address] [place] [address] [employer] or Organizations: Not on file    Attends Club or Organization Meetings: Not on file    Marital Status: Not on file   Intimate Partner Violence:     Fear of Current or Ex-Partner: Not on file    Emotionally Abused: Not on file    Physically Abused: Not on file    Sexually Abused: Not on file   Housing Stability:     Unable to Pay for Housing in the Last Year: Not on file    Number of [place] in the Last Year: Not on file    Unstable Housing in the Last Year: Not on file     Family History   Problem Relation Age of Onset    Breast Cancer Mother     Uterine Cancer Mother     Diabetes Paternal Grandmother     Uterine Cancer Other     High Cholesterol Other      Allergies:  Metformin and related    Review of Systems    Objective:    Pulse 90   Temp 97.5 °F (36.4 °C) (Temporal)   Ht 5' 5\" (1.651 m)   Wt 173 lb (78.5 kg)   SpO2 98%   BMI 28.79 kg/m²     Physical Exam  On exam her abdomen is soft and nondistended. Her incisions are healed. There are no signs of infection or evidence of hernia. Abdomen soft sclera anicteric. Assessment/Plan:          Diagnosis Orders   1. Cholecystitis, chronic       Histology reviewed. Cholangiogram reviewed.   There was an air bubble that I could not limited during cholangiogram that warranted the radiographic reading    All questions were answered. Activity and medication was reviewed    She will return back to see me in the office as needed. Please note this report has beenpartially produced using speech recognition software and may cause contain errors related to that system including grammar, punctuation and spelling as well as words and phrases that may seem inappropriate.  If there arequestions or concerns please feel free to contact me to clarify

## 2022-05-16 ENCOUNTER — HOSPITAL ENCOUNTER (EMERGENCY)
Age: 57
Discharge: HOME | End: 2022-05-16
Payer: COMMERCIAL

## 2022-05-16 ENCOUNTER — APPOINTMENT (OUTPATIENT)
Dept: GENERAL RADIOLOGY | Age: 57
End: 2022-05-16
Payer: COMMERCIAL

## 2022-05-16 VITALS
HEIGHT: 65 IN | RESPIRATION RATE: 18 BRPM | OXYGEN SATURATION: 98 % | DIASTOLIC BLOOD PRESSURE: 74 MMHG | WEIGHT: 173 LBS | BODY MASS INDEX: 28.82 KG/M2 | SYSTOLIC BLOOD PRESSURE: 122 MMHG | HEART RATE: 82 BPM | TEMPERATURE: 98.6 F
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
## 2022-05-16 DIAGNOSIS — T14.8XXA SKIN ABRASION: ICD-10-CM

## 2022-05-16 DIAGNOSIS — M25.561 ACUTE PAIN OF BOTH KNEES: ICD-10-CM

## 2022-05-16 DIAGNOSIS — M79.671 RIGHT FOOT PAIN: ICD-10-CM

## 2022-05-16 DIAGNOSIS — M25.562 ACUTE PAIN OF BOTH KNEES: ICD-10-CM

## 2022-05-16 PROCEDURE — 99284 EMERGENCY DEPT VISIT MOD MDM: CPT

## 2022-05-16 PROCEDURE — 73630 X-RAY EXAM OF FOOT: CPT

## 2022-05-16 PROCEDURE — 73562 X-RAY EXAM OF KNEE 3: CPT

## 2022-05-16 PROCEDURE — 6370000000 HC RX 637 (ALT 250 FOR IP): Performed by: STUDENT IN AN ORGANIZED HEALTH CARE EDUCATION/TRAINING PROGRAM

## 2022-05-16 RX ORDER — IBUPROFEN 600 MG/1
600 TABLET ORAL ONCE
Status: COMPLETED | OUTPATIENT
Start: 2022-05-16 | End: 2022-05-16

## 2022-05-16 RX ADMIN — IBUPROFEN 600 MG: 600 TABLET ORAL at 06:50

## 2022-05-16 ASSESSMENT — PAIN DESCRIPTION - LOCATION: LOCATION: KNEE

## 2022-05-16 ASSESSMENT — PAIN DESCRIPTION - ORIENTATION: ORIENTATION: LEFT;RIGHT

## 2022-05-16 ASSESSMENT — PAIN SCALES - GENERAL
PAINLEVEL_OUTOF10: 7
PAINLEVEL_OUTOF10: 7

## 2022-05-16 ASSESSMENT — PAIN - FUNCTIONAL ASSESSMENT
PAIN_FUNCTIONAL_ASSESSMENT: NONE - DENIES PAIN
PAIN_FUNCTIONAL_ASSESSMENT: 0-10

## 2022-05-16 NOTE — ED NOTES
Discharge instructions discussed with patient. No further questions. Patient ambulated to ED exit without assistance.       Kirsten Love RN  05/16/22 [id number]

## 2022-05-16 NOTE — Clinical Note
Stephanie Wells was seen and treated in our emergency department on 5/16/2022. She may return to work on 05/17/2022. If you have any questions or concerns, please don't hesitate to call.       Yariel Bello PA-C

## 2022-05-18 ASSESSMENT — ENCOUNTER SYMPTOMS
COUGH: 0
ABDOMINAL PAIN: 0
PHOTOPHOBIA: 0
VOMITING: 0
WHEEZING: 0
NAUSEA: 0
SHORTNESS OF BREATH: 0

## 2022-05-18 NOTE — ED PROVIDER NOTES
[address] [facility] ED  EMERGENCY DEPARTMENT ENCOUNTER      Pt Name: Violetta Lundborg  MRN: [id number]  [place] 1965  Date of evaluation: 5/16/2022  Provider: Nelson Hernandez PA-C    CHIEF COMPLAINT       Chief Complaint   Patient presents with    Fall     Pt states fell down 2 steps leaving house today. Pt states slipped and lost balance. Pt has bilateral abrasions to both knees. Pt did not lose conciousness. Some small abrasions to right foot. HISTORY OF PRESENT ILLNESS   (Location/Symptom, Timing/Onset, Context/Setting, Quality, Duration, Modifying Factors, Severity)  Note limiting factors. Violetta Lundborg is a 61 y.o. female who per chart reviews a past medical history of type 2 diabetes hyperlipidemia anxiety presents to the emergency department for evaluation of fall that occurred prior to arrival.  Patient states she was leaving her home to go to work when she slipped on 2 stairs that were wet from the rain causing her to fall onto both knees, sustaining abrasions. Also abrasions to the R foot. She did not hit her head or lose consciousness. No thinners. No medications given prior to arrival.  No syncope. She denies chest pain shortness breath abdominal pain neck or back pain bowel or bladder continence saddle anesthesia. HPI    Nursing Notes were reviewed. REVIEW OF SYSTEMS    (2-9 systems for level 4, 10 or more for level 5)     Review of Systems   Constitutional: Negative for chills and fever. HENT: Negative for congestion. Eyes: Negative for photophobia. Respiratory: Negative for cough, shortness of breath and wheezing. Cardiovascular: Negative for chest pain and palpitations. Gastrointestinal: Negative for abdominal pain, nausea and vomiting. Genitourinary: Negative for dysuria, frequency and hematuria. Musculoskeletal: Positive for arthralgias. Negative for myalgias. Allergic/Immunologic: Negative for immunocompromised state.    Neurological: Negative for dizziness, weakness and headaches. All other systems reviewed and are negative. Except as noted above the remainder of the review of systems was reviewed and negative.        PAST MEDICAL HISTORY     Past Medical History:   Diagnosis Date    Anxiety state, unspecified     Headache(784.0)     Lumbago     surgery dr Velazquez  Mixed hyperlipidemia     meds 2 months    Non morbid obesity due to excess calories 2/13/2016    Primary localized osteoarthrosis, lower leg     Type II diabetes mellitus, uncontrolled ([facility] [address]     dx 1 yr         SURGICAL HISTORY       Past Surgical History:   Procedure Laterality Date    APPENDECTOMY  1977    CHOLECYSTECTOMY, LAPAROSCOPIC N/A 4/27/2022    LAPAROSCOPIC CHOLECYSTECTOMY INTRAOPERATIVE CHOLANGIOGRAM performed by Ofelia Yao MD at [address] Right 3/27/2017    RIGHT L5-S1 MICRODISKECTOMY, 1 HR  performed by Adria Curling, MD at [facility] Right     RCR   [facility]       Discharge Medication List as of 5/16/2022  8:19 AM      CONTINUE these medications which have NOT CHANGED    Details   Na Sulfate-K Sulfate-Mg Sulf (SUPREP) 17.5-3.13-1.6 GM/177ML SOLN solution As directed, Disp-2 each, R-0Normal      pantoprazole (PROTONIX) 40 MG tablet Take 1 tablet by mouth daily, Disp-30 tablet, R-5Normal      fluticasone (FLONASE) 50 MCG/ACT nasal spray 1 spray by Nasal route daily, Disp-1 each, R-0Normal      loratadine (CLARITIN) 10 MG tablet Take 1 tablet by mouth daily, Disp-30 tablet, R-0Normal      fluconazole (DIFLUCAN) 150 MG tablet Take 1 tablet by mouth daily Take 1 tablet by mouth and then 72 hours later take 1 tablet by mouth., Disp-2 tablet, R-0Normal      Dulaglutide (TRULICITY) 6.44 ZI/0.1AG SOPN Inject 0.75 mg into the skin once a week, Disp-4 pen, R-3Normal      dapagliflozin (FARXIGA) 10 MG tablet Take 1 tablet by mouth every morning Lot #:[id number], Exp: 3/23 Disp: 2 boxes, Disp-14 tablet, R-0Sample      atorvastatin (LIPITOR) 20 MG tablet TAKE 1 TABLET DAILY, Disp-90 tablet, R-3Normal      traZODone (DESYREL) 150 MG tablet Take 1 tablet by mouth nightly, Disp-30 tablet, R-11Normal      !! ibuprofen (ADVIL;MOTRIN) 800 MG tablet TK 1 T PO  BID PRN P, Disp-120 tablet, R-5Normal      !! ibuprofen (ADVIL;MOTRIN) 800 MG tablet TAKE 1 TABLET BY MOUTH TWO TIMES A DAY AS NEEDED, Disp-60 tablet, R-0Normal      blood glucose test strips (FREESTYLE LITE) strip Pt test 3x daily dx E11.65, Disp-300 each, R-1Normal      FreeStyle Lancets MISC Disp-300 each, R-1, NormalTest 3x daily dx E11.65      glimepiride (AMARYL) 2 MG tablet TAKE 1 TABLET TWICE A DAY, Disp-180 tablet, R-1Normal      ondansetron (ZOFRAN ODT) 4 MG disintegrating tablet Take 1-2 tablets by mouth every 12 hours as needed for Nausea May Sub regular tablet (non-ODT) if insurance does not cover ODT., Disp-12 tablet, R-0Print      Insulin Pen Needle 31G X 6 MM MISC Disp-100 each, R-1, NormalUse 1 daily      Alcohol Swabs 70 % PADS Disp-100 each, R-3, Normal      Blood Glucose Monitoring Suppl (FREESTYLE LITE) ALVARO DAILY PRN Starting 3/28/2016, Until Discontinued, Disp-1 Device, R-0, Normal       !! - Potential duplicate medications found. Please discuss with provider.           ALLERGIES     Metformin and related    FAMILY HISTORY       Family History   Problem Relation Age of Onset    Breast Cancer Mother     Uterine Cancer Mother     Diabetes Paternal Grandmother     Uterine Cancer Other     High Cholesterol Other           SOCIAL HISTORY       Social History     Socioeconomic History    Marital status:      Spouse name: None    Number of children: None    Years of education: None    Highest education level: None   Occupational History    None   Tobacco Use    Smoking status: Current Every Day Smoker     Packs/day: 0.25     Years: 38.00     Pack years: 9.50 interpreted by the emergency physician with the below findings:        Interpretation per the Radiologist below, if available at the time of this note:    XR KNEE RIGHT (3 VIEWS)   Final Result   NORMAL RIGHT KNEE. XR KNEE LEFT (3 VIEWS)   Final Result   NORMAL LEFT KNEE.         XR FOOT RIGHT (MIN 3 VIEWS)   Final Result   NO ACUTE CHANGE. ED BEDSIDE ULTRASOUND:   Performed by ED Physician - none    LABS:  Labs Reviewed - No data to display    All other labs were within normal range or not returned as of this dictation. EMERGENCY DEPARTMENT COURSE and DIFFERENTIAL DIAGNOSIS/MDM:   Vitals:    Vitals:    05/16/22 0635   BP: 122/74   Pulse: 82   Resp: 18   Temp: 98.6 °F (37 °C)   TempSrc: Oral   SpO2: 98%   Weight: 173 lb (78.5 kg)   Height: 5' 5\" (1.651 m)     MDM   Patient is a 53-year-old female presents the ED for evaluation of fall. She is afebrile and hemodynamically stable. X-ray bilateral knees and right foot negative for acute traumatic injury. Patient ambulatory in the ED without difficulty. Nontoxic-appearing stable vital stable for discharge. Follow-up with primary care as needed, return to the ED as needed for worsening symptoms. Given warning signs for which she should return. Patient understands agrees to plan. REASSESSMENT          CRITICAL CARE TIME   Total Critical Care time was 0 minutes, excluding separately reportable procedures. There was a high probability of clinically significant/life threatening deterioration in the patient's condition which required my urgent intervention. CONSULTS:  None    PROCEDURES:  Unless otherwise noted below, none     Procedures        FINAL IMPRESSION      1. Fall, initial encounter    2. Skin abrasion    3. Acute pain of both knees    4.  Right foot pain          DISPOSITION/PLAN   DISPOSITION Decision To Discharge 05/16/2022 08:19:04 AM      PATIENT REFERRED TO:  Xiomara Castro MD  [address] [place], [place] [address] [address] [phone]    Schedule an appointment as soon as possible for a visit in 3 days      [facility]) ED  [address] [facility] [address] [phone]  Go to   As needed, If symptoms worsen      DISCHARGE MEDICATIONS:  Discharge Medication List as of 5/16/2022  8:19 AM        Controlled Substances Monitoring:     RX Monitoring 2/27/2020   Attestation -   Periodic Controlled Substance Monitoring Possible medication side effects, risk of tolerance/dependence & alternative treatments discussed. ;No signs of potential drug abuse or diversion identified.        (Please note that portions of this note were completed with a voice recognition program.  Efforts were made to edit the dictations but occasionally words are mis-transcribed.)    Nelson Hernandez PA-C (electronically signed)             Nelson Hernandez PA-C  05/18/22 [id number]

## 2022-05-20 DIAGNOSIS — T14.8XXA ABRASION: Primary | ICD-10-CM

## 2022-05-20 DIAGNOSIS — R10.11 RIGHT UPPER QUADRANT PAIN: ICD-10-CM

## 2022-05-20 RX ORDER — OXYCODONE HYDROCHLORIDE AND ACETAMINOPHEN 5; 325 MG/1; MG/1
1 TABLET ORAL 2 TIMES DAILY PRN
Qty: 10 TABLET | Refills: 0 | Status: SHIPPED | OUTPATIENT
Start: 2022-05-20 | End: 2022-05-25

## 2022-05-20 RX ORDER — AMOXICILLIN AND CLAVULANATE POTASSIUM 875; 125 MG/1; MG/1
1 TABLET, FILM COATED ORAL 2 TIMES DAILY
Qty: 20 TABLET | Refills: 0 | Status: SHIPPED | OUTPATIENT
Start: 2022-05-20 | End: 2022-05-30

## 2022-05-23 RX ORDER — DULAGLUTIDE 0.75 MG/.5ML
0.75 INJECTION, SOLUTION SUBCUTANEOUS WEEKLY
Qty: 2 ML | Refills: 0 | OUTPATIENT
Start: 2022-05-23

## 2022-05-24 RX ORDER — DULAGLUTIDE 0.75 MG/.5ML
0.75 INJECTION, SOLUTION SUBCUTANEOUS WEEKLY
Qty: 12 PEN | Refills: 3 | Status: SHIPPED | OUTPATIENT
Start: 2022-05-24 | End: 2022-05-26 | Stop reason: SDUPTHER

## 2022-05-26 DIAGNOSIS — E11.9 TYPE 2 DIABETES MELLITUS WITHOUT COMPLICATION, UNSPECIFIED WHETHER LONG TERM INSULIN USE (HCC): Primary | ICD-10-CM

## 2022-05-26 RX ORDER — DULAGLUTIDE 0.75 MG/.5ML
0.75 INJECTION, SOLUTION SUBCUTANEOUS WEEKLY
Qty: 6 ML | Refills: 3 | Status: SHIPPED | OUTPATIENT
Start: 2022-05-26

## 2022-05-26 NOTE — TELEPHONE ENCOUNTER
Patient requesting medication refill.  Please approve or deny this request.    Rx requested:  Requested Prescriptions     Pending Prescriptions Disp Refills    Dulaglutide (TRULICITY) 6.91 VC/4.2RN SOPN 6 mL 3     Sig: Inject 0.75 mg into the skin once a week         Last Office Visit:   12/3/2021      Next Visit Date:  Future Appointments   Date Time Provider Ramy Muñoz   6/10/2022  8:15 AM Madelin Hinds   7/22/2022  3:00 PM MD Ramy Schwarz

## 2022-05-27 ENCOUNTER — TELEPHONE (OUTPATIENT)
Dept: GASTROENTEROLOGY | Age: 57
End: 2022-05-27

## 2022-06-01 RX ORDER — IBUPROFEN 800 MG/1
TABLET ORAL
Qty: 120 TABLET | Refills: 5 | OUTPATIENT
Start: 2022-06-01

## 2022-06-13 RX ORDER — IBUPROFEN 800 MG/1
TABLET ORAL
Qty: 120 TABLET | Refills: 5 | OUTPATIENT
Start: 2022-06-13

## 2022-06-23 ENCOUNTER — COMMUNITY OUTREACH (OUTPATIENT)
Dept: PRIMARY CARE CLINIC | Age: 57
End: 2022-06-23

## 2022-07-19 ENCOUNTER — TELEPHONE (OUTPATIENT)
Dept: GASTROENTEROLOGY | Age: 57
End: 2022-07-19

## 2022-07-22 DIAGNOSIS — E78.2 MIXED HYPERLIPIDEMIA: ICD-10-CM

## 2022-07-22 RX ORDER — ATORVASTATIN CALCIUM 20 MG/1
TABLET, FILM COATED ORAL
Qty: 90 TABLET | Refills: 3 | Status: SHIPPED | OUTPATIENT
Start: 2022-07-22

## 2022-08-23 RX ORDER — TRAZODONE HYDROCHLORIDE 150 MG/1
TABLET ORAL
Qty: 30 TABLET | Refills: 11 | OUTPATIENT
Start: 2022-08-23

## 2022-11-02 ENCOUNTER — TELEPHONE (OUTPATIENT)
Dept: PRIMARY CARE CLINIC | Age: 57
End: 2022-11-02

## 2022-11-28 ENCOUNTER — TELEPHONE (OUTPATIENT)
Dept: GASTROENTEROLOGY | Age: 57
End: 2022-11-28

## 2022-11-28 ENCOUNTER — TELEPHONE (OUTPATIENT)
Dept: PRIMARY CARE CLINIC | Age: 57
End: 2022-11-28

## 2022-11-28 DIAGNOSIS — Z12.31 ENCOUNTER FOR SCREENING MAMMOGRAM FOR MALIGNANT NEOPLASM OF BREAST: Primary | ICD-10-CM

## 2023-06-02 NOTE — TELEPHONE ENCOUNTER
Did PA waiting for response
Patient calls in stating that her [employer] needs a prior authorization and asked if we could please do it soon because she will be going out of town
no

## 2023-06-06 ENCOUNTER — APPOINTMENT (OUTPATIENT)
Dept: GENERAL RADIOLOGY | Age: 58
End: 2023-06-06
Payer: COMMERCIAL

## 2023-06-06 ENCOUNTER — HOSPITAL ENCOUNTER (EMERGENCY)
Age: 58
Discharge: HOME | End: 2023-06-06
Payer: COMMERCIAL

## 2023-06-06 VITALS
TEMPERATURE: 98.8 F | OXYGEN SATURATION: 98 % | HEIGHT: 65 IN | WEIGHT: 180 LBS | BODY MASS INDEX: 29.99 KG/M2 | DIASTOLIC BLOOD PRESSURE: 84 MMHG | SYSTOLIC BLOOD PRESSURE: 148 MMHG | HEART RATE: 85 BPM | RESPIRATION RATE: 16 BRPM

## 2023-06-06 DIAGNOSIS — J40 BRONCHITIS: Primary | ICD-10-CM

## 2023-06-06 LAB
B PARAP IS1001 DNA NPH QL NAA+NON-PROBE: NOT DETECTED
B PERT.PT PRMT NPH QL NAA+NON-PROBE: NOT DETECTED
C PNEUM DNA NPH QL NAA+NON-PROBE: NOT DETECTED
FLUAV RNA NPH QL NAA+NON-PROBE: NOT DETECTED
FLUBV RNA NPH QL NAA+NON-PROBE: NOT DETECTED
GLUCOSE BLD-MCNC: 206 MG/DL (ref 70–99)
HADV DNA NPH QL NAA+NON-PROBE: NOT DETECTED
HCOV 229E RNA NPH QL NAA+NON-PROBE: NOT DETECTED
HCOV HKU1 RNA NPH QL NAA+NON-PROBE: NOT DETECTED
HCOV NL63 RNA NPH QL NAA+NON-PROBE: NOT DETECTED
HCOV OC43 RNA NPH QL NAA+NON-PROBE: NOT DETECTED
HMPV RNA NPH QL NAA+NON-PROBE: NOT DETECTED
HPIV1 RNA NPH QL NAA+NON-PROBE: NOT DETECTED
HPIV2 RNA NPH QL NAA+NON-PROBE: NOT DETECTED
HPIV3 RNA NPH QL NAA+NON-PROBE: NOT DETECTED
HPIV4 RNA NPH QL NAA+NON-PROBE: NOT DETECTED
M PNEUMO DNA NPH QL NAA+NON-PROBE: NOT DETECTED
PERFORMED ON: ABNORMAL
RSV RNA NPH QL NAA+NON-PROBE: NOT DETECTED
RV+EV RNA NPH QL NAA+NON-PROBE: NOT DETECTED
SARS-COV-2 RNA NPH QL NAA+NON-PROBE: NOT DETECTED
STREP GRP A PCR: NEGATIVE

## 2023-06-06 PROCEDURE — 87651 STREP A DNA AMP PROBE: CPT

## 2023-06-06 PROCEDURE — 6370000000 HC RX 637 (ALT 250 FOR IP)

## 2023-06-06 PROCEDURE — 94761 N-INVAS EAR/PLS OXIMETRY MLT: CPT

## 2023-06-06 PROCEDURE — 0202U NFCT DS 22 TRGT SARS-COV-2: CPT

## 2023-06-06 PROCEDURE — 94640 AIRWAY INHALATION TREATMENT: CPT

## 2023-06-06 PROCEDURE — 6360000002 HC RX W HCPCS

## 2023-06-06 PROCEDURE — 99284 EMERGENCY DEPT VISIT MOD MDM: CPT

## 2023-06-06 PROCEDURE — 71046 X-RAY EXAM CHEST 2 VIEWS: CPT

## 2023-06-06 RX ORDER — BENZONATATE 100 MG/1
100 CAPSULE ORAL 3 TIMES DAILY PRN
Qty: 20 CAPSULE | Refills: 0 | Status: SHIPPED | OUTPATIENT
Start: 2023-06-06

## 2023-06-06 RX ORDER — BENZONATATE 100 MG/1
200 CAPSULE ORAL ONCE
Status: COMPLETED | OUTPATIENT
Start: 2023-06-06 | End: 2023-06-06

## 2023-06-06 RX ORDER — ALBUTEROL SULFATE 2.5 MG/3ML
2.5 SOLUTION RESPIRATORY (INHALATION) PRN
Status: DISCONTINUED | OUTPATIENT
Start: 2023-06-06 | End: 2023-06-06 | Stop reason: HOSPADM

## 2023-06-06 RX ORDER — PREDNISONE 20 MG/1
40 TABLET ORAL ONCE
Status: COMPLETED | OUTPATIENT
Start: 2023-06-06 | End: 2023-06-06

## 2023-06-06 RX ORDER — ALBUTEROL SULFATE 90 UG/1
2 AEROSOL, METERED RESPIRATORY (INHALATION) 4 TIMES DAILY PRN
Qty: 18 G | Refills: 0 | Status: SHIPPED | OUTPATIENT
Start: 2023-06-06

## 2023-06-06 RX ORDER — AZITHROMYCIN 250 MG/1
TABLET, FILM COATED ORAL
Qty: 1 PACKET | Refills: 0 | Status: SHIPPED | OUTPATIENT
Start: 2023-06-06 | End: 2023-06-16

## 2023-06-06 RX ADMIN — BENZONATATE 200 MG: 100 CAPSULE ORAL at 05:06

## 2023-06-06 RX ADMIN — PREDNISONE 40 MG: 20 TABLET ORAL at 05:06

## 2023-06-06 RX ADMIN — ALBUTEROL SULFATE 2.5 MG: 2.5 SOLUTION RESPIRATORY (INHALATION) at 05:08

## 2023-06-06 ASSESSMENT — PAIN DESCRIPTION - DESCRIPTORS: DESCRIPTORS: DISCOMFORT

## 2023-06-06 ASSESSMENT — ENCOUNTER SYMPTOMS
VOMITING: 0
EYE ITCHING: 0
ABDOMINAL PAIN: 0
CONSTIPATION: 0
EYE DISCHARGE: 0
SORE THROAT: 1
ALLERGIC/IMMUNOLOGIC NEGATIVE: 1
NAUSEA: 0
STRIDOR: 0
EYE PAIN: 0
SHORTNESS OF BREATH: 0
RHINORRHEA: 1
COUGH: 1
DIARRHEA: 0
TROUBLE SWALLOWING: 0

## 2023-06-06 ASSESSMENT — PAIN DESCRIPTION - LOCATION: LOCATION: ABDOMEN

## 2023-06-06 ASSESSMENT — PAIN DESCRIPTION - ONSET: ONSET: ON-GOING

## 2023-06-06 ASSESSMENT — PAIN DESCRIPTION - FREQUENCY: FREQUENCY: CONTINUOUS

## 2023-06-06 ASSESSMENT — PAIN SCALES - GENERAL: PAINLEVEL_OUTOF10: 8

## 2023-06-06 NOTE — ED NOTES
Pt c/o cough since Saturday. Pt states pain in abdomen when coughing.      Dennis Shankar  06/06/23 2609

## 2023-06-06 NOTE — ED PROVIDER NOTES
(CLARITIN) 10 MG TABLET    Take 1 tablet by mouth daily    NA SULFATE-K SULFATE-MG SULF (SUPREP) 17.5-3.13-1.6 GM/177ML SOLN SOLUTION    As directed    ONDANSETRON (ZOFRAN ODT) 4 MG DISINTEGRATING TABLET    Take 1-2 tablets by mouth every 12 hours as needed for Nausea May Sub regular tablet (non-ODT) if insurance does not cover ODT. PANTOPRAZOLE (PROTONIX) 40 MG TABLET    Take 1 tablet by mouth daily    TRAZODONE (DESYREL) 150 MG TABLET    Take 1 tablet by mouth nightly       ALLERGIES     Metformin and related    FAMILY HISTORY       Family History   Problem Relation Age of Onset    Breast Cancer Mother     Uterine Cancer Mother     Diabetes Paternal Grandmother     Uterine Cancer Other     High Cholesterol Other           SOCIAL HISTORY       Social History     Socioeconomic History    Marital status:      Spouse name: None    Number of children: None    Years of education: None    Highest education level: None   Tobacco Use    Smoking status: Every Day     Packs/day: 0.25     Years: 38.00     Pack years: 9.50     Types: Cigarettes    Smokeless tobacco: Never   Vaping Use    Vaping Use: Never used   Substance and Sexual Activity    Alcohol use: Yes     Comment: rare holidays    Drug use: No         PHYSICAL EXAM        ED Triage Vitals [06/06/23 0410]   BP Temp Temp Source Pulse Respirations SpO2 Height Weight - Scale   (!) 148/84 98.8 °F (37.1 °C) Temporal 84 18 98 % 5' 5\" (1.651 m) 180 lb (81.6 kg)       Physical Exam  Vitals and nursing note reviewed. Constitutional:       General: She is not in acute distress. Appearance: Normal appearance. She is normal weight. She is not ill-appearing or toxic-appearing. HENT:      Head: Normocephalic and atraumatic. Right Ear: Tympanic membrane, ear canal and external ear normal. There is no impacted cerumen. Left Ear: Tympanic membrane, ear canal and external ear normal. There is no impacted cerumen. Nose: Congestion and rhinorrhea present.

## 2023-07-07 ENCOUNTER — TELEPHONE (OUTPATIENT)
Dept: PRIMARY CARE CLINIC | Age: 58
End: 2023-07-07

## 2023-07-17 DIAGNOSIS — E78.2 MIXED HYPERLIPIDEMIA: ICD-10-CM

## 2023-07-17 RX ORDER — ATORVASTATIN CALCIUM 20 MG/1
TABLET, FILM COATED ORAL
Qty: 90 TABLET | Refills: 3 | OUTPATIENT
Start: 2023-07-17

## 2023-09-14 ENCOUNTER — TELEPHONE (OUTPATIENT)
Dept: PRIMARY CARE CLINIC | Age: 58
End: 2023-09-14

## 2023-12-11 ENCOUNTER — TELEPHONE (OUTPATIENT)
Dept: PRIMARY CARE CLINIC | Age: 58
End: 2023-12-11

## 2024-01-23 ENCOUNTER — TELEPHONE (OUTPATIENT)
Dept: ENDOCRINOLOGY | Age: 59
End: 2024-01-23

## 2024-01-23 ENCOUNTER — HOSPITAL ENCOUNTER (OUTPATIENT)
Dept: WOMENS IMAGING | Age: 59
Discharge: HOME | End: 2024-01-25
Attending: INTERNAL MEDICINE
Payer: COMMERCIAL

## 2024-01-23 DIAGNOSIS — Z12.31 ENCOUNTER FOR SCREENING MAMMOGRAM FOR MALIGNANT NEOPLASM OF BREAST: ICD-10-CM

## 2024-01-23 PROCEDURE — 77063 BREAST TOMOSYNTHESIS BI: CPT

## 2024-01-23 NOTE — TELEPHONE ENCOUNTER
TRINI -- Received call from patient requesting to be see Dr. Corona today bc she hasnt been seen in a awhile and is off today. Pt stated she has an appt for a mammogram at 3:25pm and had about 10 minutes of free time for an appt. Nothing available to fit patients time frame. Patient last seen 12/3/2021 -- no showed to appt on 3/4/2022. Pt stated she has been out of her medication for a long time and has just been \"winging it\". Offered patient appt w/ Dr. Corona on 1/25/2024 @ 2:15pm but pt declined. Writer offered to send office refill request but patient declined. Offered to get nurse for patient to speak with but patient declined and disconnected call before writer could offer different appt date and time.

## 2024-02-12 ENCOUNTER — OFFICE VISIT (OUTPATIENT)
Dept: ORTHOPEDIC SURGERY | Age: 59
End: 2024-02-12
Payer: COMMERCIAL

## 2024-02-12 ENCOUNTER — OFFICE VISIT (OUTPATIENT)
Dept: ENDOCRINOLOGY | Age: 59
End: 2024-02-12
Payer: COMMERCIAL

## 2024-02-12 VITALS
TEMPERATURE: 97.9 F | BODY MASS INDEX: 30.32 KG/M2 | WEIGHT: 182 LBS | HEIGHT: 65 IN | HEART RATE: 84 BPM | OXYGEN SATURATION: 98 %

## 2024-02-12 VITALS
HEART RATE: 90 BPM | SYSTOLIC BLOOD PRESSURE: 118 MMHG | OXYGEN SATURATION: 98 % | WEIGHT: 185 LBS | DIASTOLIC BLOOD PRESSURE: 74 MMHG | BODY MASS INDEX: 30.82 KG/M2 | HEIGHT: 65 IN

## 2024-02-12 DIAGNOSIS — M54.12 CERVICAL RADICULOPATHY: ICD-10-CM

## 2024-02-12 DIAGNOSIS — M67.814 TENDINOSIS OF LEFT ROTATOR CUFF: ICD-10-CM

## 2024-02-12 DIAGNOSIS — E11.9 TYPE 2 DIABETES MELLITUS WITHOUT COMPLICATION, UNSPECIFIED WHETHER LONG TERM INSULIN USE (HCC): Primary | ICD-10-CM

## 2024-02-12 DIAGNOSIS — M25.512 CHRONIC LEFT SHOULDER PAIN: Primary | ICD-10-CM

## 2024-02-12 DIAGNOSIS — G89.29 CHRONIC LEFT SHOULDER PAIN: Primary | ICD-10-CM

## 2024-02-12 LAB
CHP ED QC CHECK: ABNORMAL
GLUCOSE BLD-MCNC: 262 MG/DL
HBA1C MFR BLD: 10.4 %

## 2024-02-12 PROCEDURE — 2022F DILAT RTA XM EVC RTNOPTHY: CPT | Performed by: INTERNAL MEDICINE

## 2024-02-12 PROCEDURE — 99204 OFFICE O/P NEW MOD 45 MIN: CPT | Performed by: STUDENT IN AN ORGANIZED HEALTH CARE EDUCATION/TRAINING PROGRAM

## 2024-02-12 PROCEDURE — 3017F COLORECTAL CA SCREEN DOC REV: CPT | Performed by: INTERNAL MEDICINE

## 2024-02-12 PROCEDURE — G8427 DOCREV CUR MEDS BY ELIG CLIN: HCPCS | Performed by: INTERNAL MEDICINE

## 2024-02-12 PROCEDURE — 4004F PT TOBACCO SCREEN RCVD TLK: CPT | Performed by: STUDENT IN AN ORGANIZED HEALTH CARE EDUCATION/TRAINING PROGRAM

## 2024-02-12 PROCEDURE — 82962 GLUCOSE BLOOD TEST: CPT | Performed by: INTERNAL MEDICINE

## 2024-02-12 PROCEDURE — 99213 OFFICE O/P EST LOW 20 MIN: CPT | Performed by: INTERNAL MEDICINE

## 2024-02-12 PROCEDURE — G8427 DOCREV CUR MEDS BY ELIG CLIN: HCPCS | Performed by: STUDENT IN AN ORGANIZED HEALTH CARE EDUCATION/TRAINING PROGRAM

## 2024-02-12 PROCEDURE — G8417 CALC BMI ABV UP PARAM F/U: HCPCS | Performed by: INTERNAL MEDICINE

## 2024-02-12 PROCEDURE — 3017F COLORECTAL CA SCREEN DOC REV: CPT | Performed by: STUDENT IN AN ORGANIZED HEALTH CARE EDUCATION/TRAINING PROGRAM

## 2024-02-12 PROCEDURE — 83036 HEMOGLOBIN GLYCOSYLATED A1C: CPT | Performed by: INTERNAL MEDICINE

## 2024-02-12 PROCEDURE — G8484 FLU IMMUNIZE NO ADMIN: HCPCS | Performed by: STUDENT IN AN ORGANIZED HEALTH CARE EDUCATION/TRAINING PROGRAM

## 2024-02-12 PROCEDURE — G8484 FLU IMMUNIZE NO ADMIN: HCPCS | Performed by: INTERNAL MEDICINE

## 2024-02-12 PROCEDURE — G8417 CALC BMI ABV UP PARAM F/U: HCPCS | Performed by: STUDENT IN AN ORGANIZED HEALTH CARE EDUCATION/TRAINING PROGRAM

## 2024-02-12 PROCEDURE — 3046F HEMOGLOBIN A1C LEVEL >9.0%: CPT | Performed by: INTERNAL MEDICINE

## 2024-02-12 PROCEDURE — 4004F PT TOBACCO SCREEN RCVD TLK: CPT | Performed by: INTERNAL MEDICINE

## 2024-02-12 RX ORDER — PIOGLITAZONEHYDROCHLORIDE 30 MG/1
TABLET ORAL
Qty: 90 TABLET | Refills: 3 | Status: SHIPPED | OUTPATIENT
Start: 2024-02-12

## 2024-02-12 RX ORDER — CYCLOBENZAPRINE HCL 5 MG
5 TABLET ORAL 2 TIMES DAILY PRN
Qty: 20 TABLET | Refills: 0 | Status: SHIPPED | OUTPATIENT
Start: 2024-02-12 | End: 2024-02-22

## 2024-02-12 RX ORDER — DULAGLUTIDE 1.5 MG/.5ML
1.5 INJECTION, SOLUTION SUBCUTANEOUS WEEKLY
Qty: 4 ADJUSTABLE DOSE PRE-FILLED PEN SYRINGE | Refills: 3 | Status: SHIPPED | OUTPATIENT
Start: 2024-02-12 | End: 2024-02-12 | Stop reason: SDUPTHER

## 2024-02-12 RX ORDER — MELOXICAM 7.5 MG/1
7.5 TABLET ORAL DAILY
Qty: 30 TABLET | Refills: 0 | Status: SHIPPED | OUTPATIENT
Start: 2024-02-12 | End: 2024-03-13

## 2024-02-12 RX ORDER — DULAGLUTIDE 1.5 MG/.5ML
1.5 INJECTION, SOLUTION SUBCUTANEOUS WEEKLY
Qty: 12 ADJUSTABLE DOSE PRE-FILLED PEN SYRINGE | Refills: 3 | Status: SHIPPED | OUTPATIENT
Start: 2024-02-12

## 2024-02-12 RX ORDER — GLIMEPIRIDE 2 MG/1
TABLET ORAL
Qty: 180 TABLET | Refills: 1 | Status: SHIPPED | OUTPATIENT
Start: 2024-02-12 | End: 2024-02-12 | Stop reason: SDUPTHER

## 2024-02-12 RX ORDER — GLIMEPIRIDE 2 MG/1
TABLET ORAL
Qty: 180 TABLET | Refills: 1 | Status: SHIPPED | OUTPATIENT
Start: 2024-02-12

## 2024-02-12 RX ORDER — PIOGLITAZONEHYDROCHLORIDE 30 MG/1
30 TABLET ORAL DAILY
Qty: 30 TABLET | Refills: 3 | Status: SHIPPED | OUTPATIENT
Start: 2024-02-12 | End: 2024-02-12 | Stop reason: SDUPTHER

## 2024-02-12 NOTE — PROGRESS NOTES
2/12/2024    Assessment:       Diagnosis Orders   1. Type 2 diabetes mellitus without complication, unspecified whether long term insulin use (HCC)  POCT Glucose    POCT glycosylated hemoglobin (Hb A1C)            PLAN:     Orders Placed This Encounter   Procedures    Basic Metabolic Panel     Standing Status:   Future     Standing Expiration Date:   2/12/2025    Hemoglobin A1C     Standing Status:   Future     Standing Expiration Date:   2/12/2025    Microalbumin / Creatinine Urine Ratio     Standing Status:   Future     Standing Expiration Date:   2/12/2025    POCT Glucose    POCT glycosylated hemoglobin (Hb A1C)    HM DIABETES FOOT EXAM     Orders Placed This Encounter   Medications    DISCONTD: pioglitazone (ACTOS) 30 MG tablet     Sig: Take 1 tablet by mouth daily     Dispense:  30 tablet     Refill:  3    DISCONTD: dulaglutide (TRULICITY) 1.5 MG/0.5ML SC injection     Sig: Inject 0.5 mLs into the skin once a week     Dispense:  4 Adjustable Dose Pre-filled Pen Syringe     Refill:  3    DISCONTD: glimepiride (AMARYL) 2 MG tablet     Sig: TAKE 1 TABLET TWICE A DAY     Dispense:  180 tablet     Refill:  1    dulaglutide (TRULICITY) 1.5 MG/0.5ML SC injection     Sig: Inject 0.5 mLs into the skin once a week     Dispense:  12 Adjustable Dose Pre-filled Pen Syringe     Refill:  3    glimepiride (AMARYL) 2 MG tablet     Sig: TAKE 1 TABLET TWICE A DAY     Dispense:  180 tablet     Refill:  1    pioglitazone (ACTOS) 30 MG tablet     Sig: Once a day     Dispense:  90 tablet     Refill:  3     Restart patient on Trulicity increase the dose  Continue glimepiride 2 mg twice a day add pioglitazone  Follow-up in 3 months A1c goal of less than 7    Orders Placed This Encounter   Procedures    POCT Glucose    POCT glycosylated hemoglobin (Hb A1C)     No orders of the defined types were placed in this encounter.    No follow-ups on file.  Subjective:     Chief Complaint   Patient presents with    Diabetes     Vitals:

## 2024-02-12 NOTE — PROGRESS NOTES
Orthopedic Surgery and Sports Medicine    Subjective:      Patient ID: Kateryna Garcia is a 58 y.o. female who presents today for:  Chief Complaint   Patient presents with    New Patient     Pt presents today for hirs Left Shoulder, pt states that her shoulder has been hurting for over 6 months now, she thinks the same thing is happening to her left with happened to her Right, she had to get rotator cuff surgery in 2020. The pain radiates down to her fingers. Pt states that she would not like to doing Serial Injections. Pain is disturbing the pt sleep, pt states that she feels tingling. Pain is a 9-10. Pt is not doing PT. Pt takes tylenol for pain. Pt also tried icing it and apply heat.        BAYLEE Avendaño is a 58-year-old female presents today for evaluation of her left shoulder and arm pain.  This has been present for approximately 6 months.  She denies any injury to the shoulder.  She does have a history of a right rotator cuff surgery in 2020.  She feels that this is different because she is having numbness and tingling from the neck, all the way down the arm and into the hand as well.  She feels that her hand falls asleep.  She has pain at night that causes her trouble sleeping.  She has tried taking Tylenol.  She has not done any physical therapy or injections for her shoulder.  Her last hemoglobin A1c was 8.6 on 5/19/2021.  She has not had one since then.  She is also a current smoker.    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 No  Is this issue being addressed under a worker's compensation claim? No    Past Medical History:   Diagnosis Date    Anxiety state, unspecified     Headache(784.0)     Lumbago     surgery dr Hernandez    Mixed hyperlipidemia     meds 2 months    Non morbid obesity due to excess calories 02/13/2016    Primary localized

## 2024-02-13 ENCOUNTER — TELEPHONE (OUTPATIENT)
Dept: ENDOCRINOLOGY | Age: 59
End: 2024-02-13

## 2024-02-15 ENCOUNTER — PATIENT MESSAGE (OUTPATIENT)
Dept: ENDOCRINOLOGY | Age: 59
End: 2024-02-15

## 2024-02-17 DIAGNOSIS — E78.2 MIXED HYPERLIPIDEMIA: ICD-10-CM

## 2024-02-19 ENCOUNTER — HOSPITAL ENCOUNTER (OUTPATIENT)
Dept: PHYSICAL THERAPY | Age: 59
Setting detail: THERAPIES SERIES
Discharge: HOME | End: 2024-02-19
Attending: STUDENT IN AN ORGANIZED HEALTH CARE EDUCATION/TRAINING PROGRAM
Payer: COMMERCIAL

## 2024-02-19 PROCEDURE — 97110 THERAPEUTIC EXERCISES: CPT

## 2024-02-19 PROCEDURE — 97162 PT EVAL MOD COMPLEX 30 MIN: CPT

## 2024-02-19 ASSESSMENT — PAIN SCALES - GENERAL: PAINLEVEL_OUTOF10: 4

## 2024-02-19 ASSESSMENT — PAIN DESCRIPTION - DESCRIPTORS: DESCRIPTORS: ACHING

## 2024-02-19 ASSESSMENT — PAIN DESCRIPTION - LOCATION: LOCATION: SHOULDER;NECK

## 2024-02-19 ASSESSMENT — PAIN DESCRIPTION - ORIENTATION: ORIENTATION: LEFT

## 2024-02-19 ASSESSMENT — PAIN DESCRIPTION - PAIN TYPE: TYPE: CHRONIC PAIN

## 2024-02-19 NOTE — PLAN OF CARE
(0-70): T7  R Shoulder Ext Rotation (0-90): T3   General PROM UE: Right WNL, Other (comment) (left shoulder PROM>left shoulder AROM; not formally measured)         AROM Cervical Spine   Cervical Spine AROM : Painful  Measured as: Degrees  Cervical flexion: 43  Cervical extension: 40  Cervical right lateral: 32  Cervical left lateral: 28  Cervical right rotation: 63  Cervical left rotation: 57     New   Long Term Goal 3: The patient will demonstrate improved L UE strength >/= 4+/5 in order to increase ease with functional mobility tasks & ADL's  Strength LUE  L Shoulder Flexion: 4-/5  L Shoulder Extension: 3+/5  L Shoulder ABduction: 3+/5  L Shoulder Internal Rotation: 4-/5  L Shoulder External Rotation: 4-/5       Strength RUE  R Shoulder Flexion: 5/5  R Shoulder Extension: 5/5  R Shoulder ABduction: 5/5  R Shoulder Internal Rotation: 5/5  R Shoulder External Rotation: 5/5  R Elbow Flexion: 5/5  R Elbow Extension: 5/5      New   Long Term Goal 4: The patient will demonstrate improved postural awareness requiring </=25% VC's with exercises/activities in order to improve postural awareness with functional mobility tasks & ADLs. LTG 4 Current Status:: 2/19/24: forward head, rounded shoulders, left sided lean   New   Long Term Goal 5: The patient will have a decrease in NDI score >/=6 points in order to increase functional activity tolerance LTG 5 Current Status:: 2/19/24: 13/50   New   Long term goal 6: The patient will have an increase in UEFI score >/=9 points in order to increase functional activity tolerance. LTG 6 Current Status:: 2/19/24: 46/80   New     Body Structures, Functions, Activity Limitations Requiring Skilled Therapeutic Intervention: Decreased functional mobility , Decreased ADL status, Decreased ROM, Decreased strength, Increased pain, Decreased posture  Assessment: Patient is a 58 year old female who presents with neck & left shoulder pain that has been on-going for 6+ months with no specific HOSSEIN.

## 2024-02-19 NOTE — PROGRESS NOTES
[facility] Physical Therapy-  [facility]  PHYSICAL THERAPY EVALUATION      Physical Therapy: Initial Evaluation    Patient: Kateryna Garcia (58 y.o.     female)   Examination Date: 2024   :  1965 ;    Confirmed: Yes MRN: [id number]  CSN: [id number]   Insurance: Payor: [employer] / Plan: [employer] [address] / Product Type: *No Product type* /   Insurance ID: [id number] - (Commercial)  PT Insurance Information: Medical [employer] Secondary Insurance (if applicable):     Referring Physician: Summer Rollins MD       Visits to Date/Visits Approved:     No Show/Cancelled Appts:      Medical Diagnosis: Cervical radiculopathy [M54.12]  Tendinosis of left rotator cuff [M67.814] Cervical radiculopathy, Tendinosis of left rotator cuff  Diagnosis: Cervical radiculopathy, Tendinosis of left rotator cuff   Treatment Diagnosis: increased neck & left shoulder pain, decreased pain free cervical & left shoulder ROM, decreased left UE strength, decreased ability to perform functional mobility tasks & ADLs, & decreased postural awarenes     PERTINENT MEDICAL HISTORY   Patient Assessed for Rehabilitation Services: Yes       Medical History: Chart Reviewed: Yes   Past Medical History:   Diagnosis Date    Anxiety state, unspecified     Headache(784.0)     Lumbago     surgery dr Hernandez    Mixed hyperlipidemia     meds 2 months    Non morbid obesity due to excess calories 2016    Primary localized osteoarthrosis, lower leg     Type 2 diabetes mellitus without complication (HCC) 2017    Type II diabetes mellitus, uncontrolled     dx 1 yr     Surgical History:   Past Surgical History:   Procedure Laterality Date    APPENDECTOMY      CHOLECYSTECTOMY, LAPAROSCOPIC N/A 2022    LAPAROSCOPIC CHOLECYSTECTOMY INTRAOPERATIVE CHOLANGIOGRAM performed by Hilton Packer MD at [facility] OR    DILATION AND CURETTAGE OF UTERUS      ENDOMETRIAL ABLATION      LAMINECTOMY Right 3/27/2017

## 2024-02-20 RX ORDER — ATORVASTATIN CALCIUM 20 MG/1
20 TABLET, FILM COATED ORAL DAILY
Qty: 90 TABLET | Refills: 3 | Status: SHIPPED | OUTPATIENT
Start: 2024-02-20

## 2024-02-23 ENCOUNTER — HOSPITAL ENCOUNTER (OUTPATIENT)
Dept: NEUROLOGY | Age: 59
Discharge: HOME | End: 2024-02-23
Payer: COMMERCIAL

## 2024-02-23 ENCOUNTER — HOSPITAL ENCOUNTER (OUTPATIENT)
Dept: MRI IMAGING | Age: 59
Discharge: HOME | End: 2024-02-23
Attending: STUDENT IN AN ORGANIZED HEALTH CARE EDUCATION/TRAINING PROGRAM
Payer: COMMERCIAL

## 2024-02-23 DIAGNOSIS — M25.512 CHRONIC LEFT SHOULDER PAIN: ICD-10-CM

## 2024-02-23 DIAGNOSIS — G89.29 CHRONIC LEFT SHOULDER PAIN: ICD-10-CM

## 2024-02-23 DIAGNOSIS — M54.12 CERVICAL RADICULOPATHY: ICD-10-CM

## 2024-02-23 DIAGNOSIS — M67.814 TENDINOSIS OF LEFT ROTATOR CUFF: ICD-10-CM

## 2024-02-23 PROCEDURE — 73221 MRI JOINT UPR EXTREM W/O DYE: CPT

## 2024-02-23 PROCEDURE — 95910 NRV CNDJ TEST 7-8 STUDIES: CPT

## 2024-02-23 PROCEDURE — 95886 MUSC TEST DONE W/N TEST COMP: CPT

## 2024-02-24 NOTE — PROCEDURES
[facility]                   [address] [place] [address]                             ELECTROMYOGRAM      PATIENT NAME: MONSERRAT SHUKLA             : 1965  MED REC NO: [id number]                        ROOM:   ACCOUNT NO: [id number]                       ADMIT DATE: 2024  PROVIDER: Jw Spicer MD      REFERRING PHYSICIAN:  Summer Rollins MD    REASON FOR STUDY:  The patient has tingling in the left hand along with pain.  She has pain in the left shoulder, left hand, and left upper extremity.      Motor nerve conduction velocities are slowed in the left ulnar nerve over the elbow segment and normal in all other nerves are tested.    F-wave latencies are normal in all the nerves tested.    Distal motor latency is moderately delayed in the left median nerve and normal in all other nerves tested.    Distal sensory latency is normal in the ulnar nerves and borderline delayed in the left median nerve and moderately delayed in the right median nerve.    Under concentric needle electrode examination, mild denervation changes are present in the abductor pollicis brevis muscle bilaterally, being worse on the right side.    CLINICAL INTERPRETATION:    1. Moderate right median nerve compression neuropathy at the wrist consistent with a diagnosis of moderate right carpal tunnel syndrome.  The patient had no symptoms of right carpal tunnel syndrome.  She does have weakness in the right abductor pollicis brevis muscle.  2. Decompression of right median nerve may be considered if clinically indicated   3. Mild compression of left median nerve at the wrist consistent with a diagnosis of mild left carpal tunnel syndrome.  4. Moderate compression of the left ulnar nerve at the elbow.    At this time, the patient does not have evidence of cervical radiculopathy.  Thank you, Dr. Rollins, for allowing me to see this patient.  Please feel free to call me if I can be of any further

## 2024-02-26 ENCOUNTER — OFFICE VISIT (OUTPATIENT)
Dept: ORTHOPEDIC SURGERY | Age: 59
End: 2024-02-26
Payer: COMMERCIAL

## 2024-02-26 DIAGNOSIS — M75.32 CALCIFIC TENDINITIS OF LEFT SHOULDER: Primary | ICD-10-CM

## 2024-02-26 DIAGNOSIS — M67.814 TENDINOSIS OF LEFT ROTATOR CUFF: ICD-10-CM

## 2024-02-26 PROCEDURE — G8484 FLU IMMUNIZE NO ADMIN: HCPCS | Performed by: STUDENT IN AN ORGANIZED HEALTH CARE EDUCATION/TRAINING PROGRAM

## 2024-02-26 PROCEDURE — 4004F PT TOBACCO SCREEN RCVD TLK: CPT | Performed by: STUDENT IN AN ORGANIZED HEALTH CARE EDUCATION/TRAINING PROGRAM

## 2024-02-26 PROCEDURE — 20610 DRAIN/INJ JOINT/BURSA W/O US: CPT | Performed by: STUDENT IN AN ORGANIZED HEALTH CARE EDUCATION/TRAINING PROGRAM

## 2024-02-26 PROCEDURE — 3017F COLORECTAL CA SCREEN DOC REV: CPT | Performed by: STUDENT IN AN ORGANIZED HEALTH CARE EDUCATION/TRAINING PROGRAM

## 2024-02-26 PROCEDURE — G8427 DOCREV CUR MEDS BY ELIG CLIN: HCPCS | Performed by: STUDENT IN AN ORGANIZED HEALTH CARE EDUCATION/TRAINING PROGRAM

## 2024-02-26 PROCEDURE — G8417 CALC BMI ABV UP PARAM F/U: HCPCS | Performed by: STUDENT IN AN ORGANIZED HEALTH CARE EDUCATION/TRAINING PROGRAM

## 2024-02-26 PROCEDURE — 99214 OFFICE O/P EST MOD 30 MIN: CPT | Performed by: STUDENT IN AN ORGANIZED HEALTH CARE EDUCATION/TRAINING PROGRAM

## 2024-02-26 RX ORDER — TRIAMCINOLONE ACETONIDE 40 MG/ML
40 INJECTION, SUSPENSION INTRA-ARTICULAR; INTRAMUSCULAR ONCE
Status: COMPLETED | OUTPATIENT
Start: 2024-02-26 | End: 2024-02-26

## 2024-02-26 RX ORDER — LIDOCAINE HYDROCHLORIDE 10 MG/ML
5 INJECTION, SOLUTION INFILTRATION; PERINEURAL ONCE
Status: COMPLETED | OUTPATIENT
Start: 2024-02-26 | End: 2024-02-26

## 2024-02-26 RX ADMIN — TRIAMCINOLONE ACETONIDE 40 MG: 40 INJECTION, SUSPENSION INTRA-ARTICULAR; INTRAMUSCULAR at 15:44

## 2024-02-26 RX ADMIN — LIDOCAINE HYDROCHLORIDE 5 ML: 10 INJECTION, SOLUTION INFILTRATION; PERINEURAL at 15:44

## 2024-02-26 ASSESSMENT — ENCOUNTER SYMPTOMS
GASTROINTESTINAL NEGATIVE: 1
EYES NEGATIVE: 1
RESPIRATORY NEGATIVE: 1
ALLERGIC/IMMUNOLOGIC NEGATIVE: 1

## 2024-02-26 NOTE — PROGRESS NOTES
persist    Orders Placed This Encounter   Procedures    AK ARTHROCENTESIS ASPIR&/INJ MAJOR JT/BURSA W/O US     Orders Placed This Encounter   Medications    lidocaine 1 % injection 5 mL    triamcinolone acetonide (KENALOG-40) injection 40 mg     Return if symptoms worsen or fail to improve.    Summer Rollins MD

## 2024-02-27 ENCOUNTER — HOSPITAL ENCOUNTER (OUTPATIENT)
Dept: PHYSICAL THERAPY | Age: 59
Setting detail: THERAPIES SERIES
Discharge: HOME | End: 2024-02-27
Attending: STUDENT IN AN ORGANIZED HEALTH CARE EDUCATION/TRAINING PROGRAM
Payer: COMMERCIAL

## 2024-02-27 NOTE — PROGRESS NOTES
Therapy                            Cancellation/No-show Note      Date: 2024  Patient: Kateryna Garcia (58 y.o. female)  : 1965  MRN:  [id number]  Referring Physician: Summer Rollins MD    Medical Diagnosis: Cervical radiculopathy [M54.12]  Tendinosis of left rotator cuff [M67.814]      Visit Information:  Visits to Date 2   No Show/Cancelled Appts: 0       For today's appointment patient:  [x]  Cancelled  []  Rescheduled appointment  []  No-show   []  Called pt to remind of next appointment     Reason given by patient:  []  Patient ill  [x]  Conflicting appointment  []  No transportation    []  Conflict with work  []  No reason given  []  Other:      [x] Pt has future appointments scheduled, no follow up needed  [] Pt requests to be on hold.    Reason:   If > 2 weeks please discuss with therapist.  [] Therapist to call pt for follow up     Comments:       Signature: Electronically signed by Garett Johnson PTA on 24 at 4:38 PM EST

## 2024-02-28 ENCOUNTER — PATIENT MESSAGE (OUTPATIENT)
Dept: ENDOCRINOLOGY | Age: 59
End: 2024-02-28

## 2024-02-28 DIAGNOSIS — E11.9 TYPE 2 DIABETES MELLITUS WITHOUT COMPLICATION, UNSPECIFIED WHETHER LONG TERM INSULIN USE (HCC): Primary | ICD-10-CM

## 2024-02-28 RX ORDER — DULAGLUTIDE 1.5 MG/.5ML
1.5 INJECTION, SOLUTION SUBCUTANEOUS WEEKLY
Qty: 12 ADJUSTABLE DOSE PRE-FILLED PEN SYRINGE | Refills: 3 | Status: SHIPPED | OUTPATIENT
Start: 2024-02-28

## 2024-02-28 NOTE — TELEPHONE ENCOUNTER
From: Kateryna Garcia  To: Dr. Huan Corona  Sent: 2/28/2024 12:45 PM EST  Subject: Trulicity    Medication Unavailable  Step 1: Stopped, active  Stopped  Step 2: Processing, incomplete  Processing  Step 3: Shipped, incomplete  Shipped  Step 4: Delivered, incomplete  Delivered  Prescription Icon  Trulicity Sd Pen 0.5ml 4's  1.5 mg  1 qty  28-day supply  Temperature Sensitive  Rx Number [id number]# [id number]  Prescription details  Prescriber: Huan Avendaño  New Fill  Cost available when order ships  (Est. cost)    Available Actions  Alert Romeo  We're sorry but this medication is not available for delivery right now. Please contact your doctor for more information or to transfer this prescription.    Are we able to transfer this to [facility] on file? I literally just found out today that this is going on.

## 2024-03-05 ENCOUNTER — HOSPITAL ENCOUNTER (OUTPATIENT)
Dept: PHYSICAL THERAPY | Age: 59
Setting detail: THERAPIES SERIES
Discharge: HOME | End: 2024-03-05
Attending: STUDENT IN AN ORGANIZED HEALTH CARE EDUCATION/TRAINING PROGRAM
Payer: COMMERCIAL

## 2024-03-05 PROCEDURE — 97110 THERAPEUTIC EXERCISES: CPT

## 2024-03-05 ASSESSMENT — PAIN SCALES - GENERAL: PAINLEVEL_OUTOF10: 4

## 2024-03-05 ASSESSMENT — PAIN DESCRIPTION - DESCRIPTORS: DESCRIPTORS: ACHING;SHOOTING;SHARP

## 2024-03-05 ASSESSMENT — PAIN DESCRIPTION - PAIN TYPE: TYPE: CHRONIC PAIN

## 2024-03-05 ASSESSMENT — PAIN DESCRIPTION - ORIENTATION: ORIENTATION: LEFT

## 2024-03-05 ASSESSMENT — PAIN DESCRIPTION - LOCATION: LOCATION: SHOULDER;NECK

## 2024-03-05 NOTE — PROGRESS NOTES
tolerance In progress   LTG 6 The patient will have an increase in UEFI score >/=9 points in order to increase functional activity tolerance. In progress          Plan:  Frequency/Duration:  Plan  Plan Frequency: 1-2xs/wk  Plan weeks: 4-6 weeks  Current Treatment Recommendations: Strengthening, ROM, Functional mobility training, Neuromuscular re-education, Safety education & training, Home exercise program, Therapeutic activities, Group Therapy, Patient/Caregiver education & training, Manual, ADL/Self-care training, IADL training, Endurance training, Equipment evaluation, education, & procurement, Pain management, Modalities, Positioning  Modalities: Heat/Cold, Mechanical Traction, Ultrasound, E-stim - unattended, Vasopneumatic Device  Additional Comments: may be only able to do 1x/wk due to financial issues  Pt to continue current HEP.  See objective section for any therapeutic exercise changes, additions or modifications this date.    Therapy Time:      PT Individual Minutes  Time In: 1715  Time Out: 1757  Minutes: 42  Timed Code Treatment Minutes: 42 Minutes  Procedure Minutes: 0  Timed Activity Minutes Units   Ther Ex 42 3   Electronically signed by Garett Johnson PTA on 3/5/24 at 5:59 PM EST

## 2024-03-12 ENCOUNTER — HOSPITAL ENCOUNTER (OUTPATIENT)
Dept: PHYSICAL THERAPY | Age: 59
Setting detail: THERAPIES SERIES
Discharge: HOME | End: 2024-03-12
Attending: STUDENT IN AN ORGANIZED HEALTH CARE EDUCATION/TRAINING PROGRAM
Payer: COMMERCIAL

## 2024-04-02 NOTE — PROGRESS NOTES
Long Term Goal 5: The patient will have a decrease in NDI score >/=6 points in order to increase functional activity tolerance Unable to assess    Unknown   Long term goal 6: The patient will have an increase in UEFI score >/=9 points in order to increase functional activity tolerance. Unable to assess Unknown      Body Structures, Functions, Activity Limitations Requiring Skilled Therapeutic Intervention: Decreased functional mobility , Decreased ADL status, Decreased ROM, Decreased strength, Increased pain, Decreased posture    Assessment: Patient is a 58 year old female who presented on 2/19/24 with neck & left shoulder pain that has been on-going for 6+ months prior with no specific HOSSEIN.  Patient discharged from outpatient PT services this date secondary to attendance policy.  Patient will require new order from MD if patient wishes to attend outpatient PT services in the future.    Therapy Prognosis: Fair, Good     PT Education: PT Role;Goals;Plan of Care;Evaluative findings;Insurance;Home Exercise Program;Anatomy of condition     PLAN:   Frequency/Duration:  Plan Frequency: 1-2xs/wk  Plan weeks: 4-6 weeks  Current Treatment Recommendations: Strengthening, ROM, Functional mobility training, Neuromuscular re-education, Safety education & training, Home exercise program, Therapeutic activities, Group Therapy, Patient/Caregiver education & training, Manual, ADL/Self-care training, IADL training, Endurance training, Equipment evaluation, education, & procurement, Pain management, Modalities, Positioning  Modalities: Heat/Cold, Mechanical Traction, Ultrasound, E-stim - unattended, Vasopneumatic Device  Additional Comments: may be only able to do 1x/wk due to financial issues  Precautions:       diabetes        Patient Status:[] Continue/ Initiate plan of Care                           [x] Discharge PT.  Recommend pt continue with HEP.                            [] Additional visits requested, Please re-certify for

## 2024-05-22 RX ORDER — MELOXICAM 7.5 MG/1
7.5 TABLET ORAL DAILY
Qty: 30 TABLET | Refills: 2 | Status: SHIPPED | OUTPATIENT
Start: 2024-05-22

## 2024-05-22 NOTE — TELEPHONE ENCOUNTER
Please review the following medication request:    MELOXICAM 7.5 MG Tablet    Preferred Pharmacy is: [facility] - [place], [place] [address] [phone] - F [phone]

## 2024-07-26 ENCOUNTER — OFFICE VISIT (OUTPATIENT)
Dept: FAMILY MEDICINE CLINIC | Age: 59
End: 2024-07-26
Payer: COMMERCIAL

## 2024-07-26 VITALS
BODY MASS INDEX: 30.79 KG/M2 | HEART RATE: 82 BPM | HEIGHT: 65 IN | OXYGEN SATURATION: 96 % | TEMPERATURE: 98.5 F | SYSTOLIC BLOOD PRESSURE: 120 MMHG | DIASTOLIC BLOOD PRESSURE: 80 MMHG

## 2024-07-26 DIAGNOSIS — R05.9 COUGH, UNSPECIFIED TYPE: ICD-10-CM

## 2024-07-26 DIAGNOSIS — B34.9 VIRAL ILLNESS: Primary | ICD-10-CM

## 2024-07-26 DIAGNOSIS — J02.9 SORE THROAT: ICD-10-CM

## 2024-07-26 LAB
Lab: NORMAL
PERFORMING INSTRUMENT: NORMAL
QC PASS/FAIL: NORMAL
S PYO AG THROAT QL: NORMAL
SARS-COV-2, POC: NORMAL

## 2024-07-26 PROCEDURE — G8417 CALC BMI ABV UP PARAM F/U: HCPCS | Performed by: PHYSICIAN ASSISTANT

## 2024-07-26 PROCEDURE — 87426 SARSCOV CORONAVIRUS AG IA: CPT | Performed by: PHYSICIAN ASSISTANT

## 2024-07-26 PROCEDURE — G8427 DOCREV CUR MEDS BY ELIG CLIN: HCPCS | Performed by: PHYSICIAN ASSISTANT

## 2024-07-26 PROCEDURE — 87880 STREP A ASSAY W/OPTIC: CPT | Performed by: PHYSICIAN ASSISTANT

## 2024-07-26 PROCEDURE — 99213 OFFICE O/P EST LOW 20 MIN: CPT | Performed by: PHYSICIAN ASSISTANT

## 2024-07-26 PROCEDURE — 4004F PT TOBACCO SCREEN RCVD TLK: CPT | Performed by: PHYSICIAN ASSISTANT

## 2024-07-26 PROCEDURE — 3017F COLORECTAL CA SCREEN DOC REV: CPT | Performed by: PHYSICIAN ASSISTANT

## 2024-07-26 RX ORDER — BENZONATATE 200 MG/1
200 CAPSULE ORAL 3 TIMES DAILY
Qty: 21 CAPSULE | Refills: 0 | Status: SHIPPED | OUTPATIENT
Start: 2024-07-26 | End: 2024-08-02

## 2024-07-26 RX ORDER — ONDANSETRON 4 MG/1
4 TABLET, ORALLY DISINTEGRATING ORAL 3 TIMES DAILY
Qty: 15 TABLET | Refills: 0 | Status: SHIPPED | OUTPATIENT
Start: 2024-07-26

## 2024-07-26 RX ORDER — DICYCLOMINE HCL 20 MG
20 TABLET ORAL 3 TIMES DAILY
Qty: 12 TABLET | Refills: 0 | Status: SHIPPED | OUTPATIENT
Start: 2024-07-26 | End: 2024-07-30

## 2024-07-26 ASSESSMENT — ENCOUNTER SYMPTOMS
EYES NEGATIVE: 1
DIARRHEA: 1
VOMITING: 1
SORE THROAT: 1
COUGH: 1

## 2024-07-26 NOTE — PATIENT INSTRUCTIONS
[place] Financial Resources*  (Call [employer] [email or web address] if need more resources.)        MEDICAL:    [facility]   What they offer: [employer] discounts fees for qualifying insured and uninsured persons.  We can assist you in signing up for Medicaid, Medicare, and Marketplace Exchange insurance plans.  They offer 4 locations in [place], 2 locations in [place] and 1 in [facility].    Phone Number: [phone]  Website: [email or web address]    [facility]:  What they offer: We provide health care services to the uninsured and underinsured. From providing quality care to connecting patients to critical community resources, our patients and their needs are our highest priority.  Phone number: [phone]  Website: https://TapCanvas   [facility] Financial Assistance:  What they offer: Assistance with medical bills  Phone number: 1-745.987.7383 option 5    UTILITY:         [employer]/211:  What they offer: Help find lower cost options for phone or internet as well as help paying utility bills.   Phone Number: 211  Website: https://Boardvote/get-help/utilities-expenses   [employer]  What they offer:  Assistance with utilities  Phone:  [phone]    [facility]   What they Offer: Assistance with utilities  Phone: [phone]  Website: https://www.[employer]/    [employer]  What they Offer: Assistance with utilities  Phone: 488.749.5896  Website: https://[employer]Swarm64.org/    MEDICATIONS:   [facility]  What they offer: [employer] tracks prescription drug prices and provides free drug coupons for discounts on medications.  Website: https://www.Hymite/  [employer]:  What they offer: [employer] offers free information on medications and healthcare cost savings programs including prescription assistance programs, coupons, and discount programs.  Helpline: 854.511.8386  Website: https://www.needShizzlreds.org  [facility]:  What they offer: Medication

## 2024-07-26 NOTE — PROGRESS NOTES
[facility] [place] [facility], [place] [id number]  Dept: [phone]  Loc: [phone]     Pt Name: Kateryna Garcia  MRN: [id number]  Birthdate 1965      HISTORY OF PRESENT ILLNESS    Kateryna Garcia is a 58 y.o. female who presents to the [facility] with chief complaint of sore throat that started about a week ago. She says then the nasal congestion started and cough and has been persistent.  She says her temperature was elevated last night. She complains of headache and bodyaches. She has had diarrhea today and vomiting that started yesterday. She has not done any home covid tests and doesn't recall having it recently. She has not had any ill contacts at work and has no other concerns at this time. She has no other concerns at this time.         REVIEW OF SYSTEMS       Review of Systems   Constitutional:  Positive for chills.   HENT:  Positive for congestion and sore throat.    Eyes: Negative.    Respiratory:  Positive for cough.    Cardiovascular: Negative.    Gastrointestinal:  Positive for diarrhea and vomiting.   Endocrine: Negative.    Genitourinary: Negative.    Musculoskeletal:  Positive for myalgias.   Skin: Negative.    Neurological: Negative.    Psychiatric/Behavioral: Negative.           PAST MEDICAL HISTORY     Past Medical History:   Diagnosis Date    Anxiety state, unspecified     Headache(784.0)     Lumbago     surgery dr Hernandez    Mixed hyperlipidemia     meds 2 months    Non morbid obesity due to excess calories 02/13/2016    Primary localized osteoarthrosis, lower leg     Type 2 diabetes mellitus without complication (HCC) 03/21/2017    Type II diabetes mellitus, uncontrolled     dx 1 yr         SURGICAL HISTORY       Past Surgical History:   Procedure Laterality Date    APPENDECTOMY  1977    CHOLECYSTECTOMY, LAPAROSCOPIC N/A 4/27/2022    LAPAROSCOPIC CHOLECYSTECTOMY INTRAOPERATIVE CHOLANGIOGRAM performed by  PT D/C WITH D/C SUMMARY AND SCRIPTS. ALL QUESTIONS ANSWERED. PT AMBULATES TO 
REGISTRATION DESK WITH SAFE D/C HOME WITH MOTHER. PT AND MOTHER DENY ANY OTHER 
NEEDS PERTAINING TO THIS VISIT.

## 2024-09-30 ENCOUNTER — TELEPHONE (OUTPATIENT)
Dept: PRIMARY CARE CLINIC | Age: 59
End: 2024-09-30

## 2024-10-15 DIAGNOSIS — E78.2 MIXED HYPERLIPIDEMIA: ICD-10-CM

## 2024-10-15 RX ORDER — ATORVASTATIN CALCIUM 20 MG/1
20 TABLET, FILM COATED ORAL DAILY
Qty: 90 TABLET | Refills: 0 | Status: SHIPPED | OUTPATIENT
Start: 2024-10-15

## 2024-10-15 RX ORDER — GLIMEPIRIDE 2 MG/1
TABLET ORAL
Qty: 180 TABLET | Refills: 0 | Status: SHIPPED | OUTPATIENT
Start: 2024-10-15

## 2024-10-15 RX ORDER — PIOGLITAZONEHYDROCHLORIDE 30 MG/1
TABLET ORAL
Qty: 90 TABLET | Refills: 0 | Status: SHIPPED | OUTPATIENT
Start: 2024-10-15

## 2025-01-22 DIAGNOSIS — E78.2 MIXED HYPERLIPIDEMIA: ICD-10-CM

## 2025-01-22 RX ORDER — ATORVASTATIN CALCIUM 20 MG/1
20 TABLET, FILM COATED ORAL DAILY
Qty: 30 TABLET | Refills: 0 | Status: SHIPPED | OUTPATIENT
Start: 2025-01-22

## 2025-03-19 RX ORDER — PIOGLITAZONE 30 MG/1
TABLET ORAL
Qty: 90 TABLET | Refills: 0 | Status: SHIPPED | OUTPATIENT
Start: 2025-03-19

## 2025-05-02 ENCOUNTER — FOLLOWUP TELEPHONE ENCOUNTER (OUTPATIENT)
Dept: PRIMARY CARE CLINIC | Age: 60
End: 2025-05-02

## 2025-05-02 ENCOUNTER — OFFICE VISIT (OUTPATIENT)
Age: 60
End: 2025-05-02
Payer: COMMERCIAL

## 2025-05-02 VITALS
SYSTOLIC BLOOD PRESSURE: 136 MMHG | HEIGHT: 65 IN | WEIGHT: 193 LBS | BODY MASS INDEX: 32.15 KG/M2 | DIASTOLIC BLOOD PRESSURE: 85 MMHG | HEART RATE: 93 BPM

## 2025-05-02 DIAGNOSIS — E78.2 MIXED HYPERLIPIDEMIA: ICD-10-CM

## 2025-05-02 DIAGNOSIS — E11.9 TYPE 2 DIABETES MELLITUS WITHOUT COMPLICATION, UNSPECIFIED WHETHER LONG TERM INSULIN USE (HCC): Primary | ICD-10-CM

## 2025-05-02 LAB
CHP ED QC CHECK: NORMAL
GLUCOSE BLD-MCNC: 140 MG/DL
HBA1C MFR BLD: 9.2 %

## 2025-05-02 PROCEDURE — 83036 HEMOGLOBIN GLYCOSYLATED A1C: CPT | Performed by: INTERNAL MEDICINE

## 2025-05-02 PROCEDURE — 2022F DILAT RTA XM EVC RTNOPTHY: CPT | Performed by: INTERNAL MEDICINE

## 2025-05-02 PROCEDURE — 3017F COLORECTAL CA SCREEN DOC REV: CPT | Performed by: INTERNAL MEDICINE

## 2025-05-02 PROCEDURE — G8427 DOCREV CUR MEDS BY ELIG CLIN: HCPCS | Performed by: INTERNAL MEDICINE

## 2025-05-02 PROCEDURE — 82962 GLUCOSE BLOOD TEST: CPT | Performed by: INTERNAL MEDICINE

## 2025-05-02 PROCEDURE — 99214 OFFICE O/P EST MOD 30 MIN: CPT | Performed by: INTERNAL MEDICINE

## 2025-05-02 PROCEDURE — 4004F PT TOBACCO SCREEN RCVD TLK: CPT | Performed by: INTERNAL MEDICINE

## 2025-05-02 PROCEDURE — 3046F HEMOGLOBIN A1C LEVEL >9.0%: CPT | Performed by: INTERNAL MEDICINE

## 2025-05-02 PROCEDURE — G8417 CALC BMI ABV UP PARAM F/U: HCPCS | Performed by: INTERNAL MEDICINE

## 2025-05-02 RX ORDER — PIOGLITAZONE 30 MG/1
TABLET ORAL
Qty: 90 TABLET | Refills: 3 | Status: SHIPPED | OUTPATIENT
Start: 2025-05-02

## 2025-05-02 RX ORDER — GLIMEPIRIDE 2 MG/1
TABLET ORAL
Qty: 180 TABLET | Refills: 3 | Status: SHIPPED | OUTPATIENT
Start: 2025-05-02

## 2025-05-02 RX ORDER — ATORVASTATIN CALCIUM 20 MG/1
20 TABLET, FILM COATED ORAL DAILY
Qty: 90 TABLET | Refills: 3 | Status: SHIPPED | OUTPATIENT
Start: 2025-05-02

## 2025-05-02 RX ORDER — IBUPROFEN 800 MG/1
800 TABLET, FILM COATED ORAL 2 TIMES DAILY PRN
Qty: 100 TABLET | Refills: 2 | Status: SHIPPED | OUTPATIENT
Start: 2025-05-02

## 2025-05-02 NOTE — PROGRESS NOTES
Physical Activity: Not on file   Stress: Not on file   Social Connections: Not on file   Intimate Partner Violence: Not on file   Housing Stability: Unknown (7/26/2024)    Housing Stability Vital Sign     Unable to Pay for Housing in the Last Year: Not on file     Number of Places Lived in the Last Year: Not on file     Unstable Housing in the Last Year: No     Family History   Problem Relation Age of Onset    Breast Cancer Mother     Uterine Cancer Mother     Diabetes Paternal Grandmother     Uterine Cancer Other     High Cholesterol Other      Allergies   Allergen Reactions    Metformin And Related      Diarrhea        Current Outpatient Medications:     pioglitazone (ACTOS) 30 MG tablet, Once a day, Disp: 90 tablet, Rfl: 0    atorvastatin (LIPITOR) 20 MG tablet, TAKE 1 TABLET DAILY, Disp: 30 tablet, Rfl: 0    glimepiride (AMARYL) 2 MG tablet, TAKE 1 TABLET TWICE A DAY, Disp: 180 tablet, Rfl: 0    ondansetron (ZOFRAN-ODT) 4 MG disintegrating tablet, Take 1 tablet by mouth in the morning, at noon, and at bedtime, Disp: 15 tablet, Rfl: 0    blood glucose test strips (FREESTYLE LITE) strip, Pt test 3x daily dx E11.65, Disp: 300 each, Rfl: 1    FreeStyle Lancets MISC, Test 3x daily dx E11.65, Disp: 300 each, Rfl: 1    Alcohol Swabs 70 % PADS, USE TWICE DAILY, Disp: 100 each, Rfl: 3    Blood Glucose Monitoring Suppl (FREESTYLE LITE) ALVARO, 1 Device by Does not apply route daily as needed, Disp: 1 Device, Rfl: 0    dicyclomine (BENTYL) 20 MG tablet, Take 1 tablet by mouth in the morning, at noon, and at bedtime for 4 days, Disp: 12 tablet, Rfl: 0    vitamin D (CHOLECALCIFEROL) 25 MCG (1000 UT) TABS tablet, Take 5 tablets by mouth daily for 7 days, Disp: 35 tablet, Rfl: 0  Lab Results   Component Value Date     04/18/2022    K 4.0 04/18/2022     04/18/2022    CO2 24 04/18/2022    BUN 13 04/18/2022    CREATININE 0.47 (L) 04/18/2022    GLUCOSE 103 (H) 04/18/2022    CALCIUM 9.1 04/18/2022    BILITOT 0.3

## 2025-07-09 ENCOUNTER — OFFICE VISIT (OUTPATIENT)
Age: 60
End: 2025-07-09
Payer: COMMERCIAL

## 2025-07-09 VITALS
RESPIRATION RATE: 17 BRPM | HEART RATE: 100 BPM | TEMPERATURE: 98.7 F | SYSTOLIC BLOOD PRESSURE: 138 MMHG | HEIGHT: 65 IN | OXYGEN SATURATION: 93 % | BODY MASS INDEX: 31.62 KG/M2 | DIASTOLIC BLOOD PRESSURE: 38 MMHG | WEIGHT: 189.8 LBS

## 2025-07-09 DIAGNOSIS — J98.8 BACTERIAL RESPIRATORY INFECTION: Primary | ICD-10-CM

## 2025-07-09 DIAGNOSIS — J01.90 ACUTE BACTERIAL SINUSITIS: ICD-10-CM

## 2025-07-09 DIAGNOSIS — B96.89 ACUTE BACTERIAL SINUSITIS: ICD-10-CM

## 2025-07-09 DIAGNOSIS — H65.191 ACUTE EFFUSION OF RIGHT EAR: ICD-10-CM

## 2025-07-09 DIAGNOSIS — R68.83 CHILLS: ICD-10-CM

## 2025-07-09 DIAGNOSIS — B96.89 BACTERIAL RESPIRATORY INFECTION: Primary | ICD-10-CM

## 2025-07-09 DIAGNOSIS — R53.83 FATIGUE, UNSPECIFIED TYPE: ICD-10-CM

## 2025-07-09 DIAGNOSIS — R05.1 ACUTE COUGH: ICD-10-CM

## 2025-07-09 PROCEDURE — 87635 SARS-COV-2 COVID-19 AMP PRB: CPT | Performed by: NURSE PRACTITIONER

## 2025-07-09 PROCEDURE — 99213 OFFICE O/P EST LOW 20 MIN: CPT | Performed by: NURSE PRACTITIONER

## 2025-07-09 RX ORDER — AZITHROMYCIN 250 MG/1
TABLET, FILM COATED ORAL
Qty: 6 TABLET | Refills: 0 | Status: SHIPPED | OUTPATIENT
Start: 2025-07-09 | End: 2025-07-19

## 2025-07-09 RX ORDER — DEXTROMETHORPHAN POLISTIREX 30 MG/5ML
60 SUSPENSION ORAL 2 TIMES DAILY PRN
Qty: 148 ML | Refills: 0 | Status: SHIPPED | OUTPATIENT
Start: 2025-07-09 | End: 2025-07-19

## 2025-07-09 SDOH — ECONOMIC STABILITY: FOOD INSECURITY: WITHIN THE PAST 12 MONTHS, THE FOOD YOU BOUGHT JUST DIDN'T LAST AND YOU DIDN'T HAVE MONEY TO GET MORE.: NEVER TRUE

## 2025-07-09 SDOH — ECONOMIC STABILITY: FOOD INSECURITY: WITHIN THE PAST 12 MONTHS, YOU WORRIED THAT YOUR FOOD WOULD RUN OUT BEFORE YOU GOT MONEY TO BUY MORE.: NEVER TRUE

## 2025-07-09 ASSESSMENT — PATIENT HEALTH QUESTIONNAIRE - PHQ9
SUM OF ALL RESPONSES TO PHQ QUESTIONS 1-9: 0
2. FEELING DOWN, DEPRESSED OR HOPELESS: NOT AT ALL
SUM OF ALL RESPONSES TO PHQ QUESTIONS 1-9: 0
SUM OF ALL RESPONSES TO PHQ QUESTIONS 1-9: 0
1. LITTLE INTEREST OR PLEASURE IN DOING THINGS: NOT AT ALL
SUM OF ALL RESPONSES TO PHQ QUESTIONS 1-9: 0

## 2025-07-09 ASSESSMENT — ENCOUNTER SYMPTOMS
SHORTNESS OF BREATH: 1
SINUS PRESSURE: 1
SINUS PAIN: 1
ABDOMINAL PAIN: 0
RHINORRHEA: 1
CHEST TIGHTNESS: 1
NAUSEA: 0
VOMITING: 0
COUGH: 1
DIARRHEA: 0
SORE THROAT: 0

## 2025-07-09 NOTE — PROGRESS NOTES
Pulmonary:      Effort: Pulmonary effort is normal.      Breath sounds: Examination of the right-upper field reveals wheezing and rales. Wheezing and rales present.   Abdominal:      Palpations: Abdomen is soft.   Musculoskeletal:         General: Normal range of motion.      Cervical back: Normal range of motion. No rigidity.   Lymphadenopathy:      Head:      Right side of head: No preauricular or posterior auricular adenopathy.      Left side of head: No preauricular or posterior auricular adenopathy.   Skin:     General: Skin is warm and dry.   Neurological:      Mental Status: She is alert and oriented to person, place, and time.      Motor: Motor function is intact.                 Assessment & Plan    Diagnosis Orders   1. Bacterial respiratory infection  azithromycin (ZITHROMAX) 250 MG tablet    amoxicillin-clavulanate (AUGMENTIN) 875-125 MG per tablet      2. Acute bacterial sinusitis  amoxicillin-clavulanate (AUGMENTIN) 875-125 MG per tablet      3. Acute effusion of right ear        4. Acute cough  POCT COVID-19 Rapid, NAAT    dextromethorphan (DELSYM) 30 MG/5ML extended release liquid      5. Fatigue, unspecified type  POCT COVID-19 Rapid, NAAT      6. Chills  POCT COVID-19 Rapid, NAAT        Orders Placed This Encounter   Procedures    POCT COVID-19 Rapid, NAAT     Pregnant?:   No     Orders Placed This Encounter   Medications    azithromycin (ZITHROMAX) 250 MG tablet     Simg on day 1 followed by 250mg on days 2 - 5     Dispense:  6 tablet     Refill:  0    amoxicillin-clavulanate (AUGMENTIN) 875-125 MG per tablet     Sig: Take 1 tablet by mouth 2 times daily for 7 days     Dispense:  14 tablet     Refill:  0    dextromethorphan (DELSYM) 30 MG/5ML extended release liquid     Sig: Take 10 mLs by mouth 2 times daily as needed for Cough     Dispense:  148 mL     Refill:  0       Return if symptoms worsen or fail to improve, for follow up with PCP.    Reviewed with the patient: current clinical

## 2025-07-16 ENCOUNTER — COMMUNITY OUTREACH (OUTPATIENT)
Dept: PRIMARY CARE CLINIC | Age: 60
End: 2025-07-16

## 2025-08-06 ENCOUNTER — OFFICE VISIT (OUTPATIENT)
Age: 60
End: 2025-08-06
Payer: COMMERCIAL

## 2025-08-06 VITALS
HEIGHT: 65 IN | SYSTOLIC BLOOD PRESSURE: 146 MMHG | OXYGEN SATURATION: 96 % | HEART RATE: 83 BPM | BODY MASS INDEX: 32.32 KG/M2 | DIASTOLIC BLOOD PRESSURE: 86 MMHG | WEIGHT: 194 LBS

## 2025-08-06 DIAGNOSIS — E78.2 MIXED HYPERLIPIDEMIA: ICD-10-CM

## 2025-08-06 DIAGNOSIS — E11.9 TYPE 2 DIABETES MELLITUS WITHOUT COMPLICATION, UNSPECIFIED WHETHER LONG TERM INSULIN USE (HCC): Primary | ICD-10-CM

## 2025-08-06 LAB
CHP ED QC CHECK: NORMAL
GLUCOSE BLD-MCNC: 166 MG/DL
HBA1C MFR BLD: 8.6 %

## 2025-08-06 PROCEDURE — 82962 GLUCOSE BLOOD TEST: CPT | Performed by: INTERNAL MEDICINE

## 2025-08-06 PROCEDURE — 83036 HEMOGLOBIN GLYCOSYLATED A1C: CPT | Performed by: INTERNAL MEDICINE

## 2025-08-06 PROCEDURE — 99213 OFFICE O/P EST LOW 20 MIN: CPT | Performed by: INTERNAL MEDICINE

## 2025-08-06 PROCEDURE — 3052F HG A1C>EQUAL 8.0%<EQUAL 9.0%: CPT | Performed by: INTERNAL MEDICINE

## 2025-08-06 RX ORDER — IBUPROFEN 800 MG/1
800 TABLET, FILM COATED ORAL 2 TIMES DAILY PRN
Qty: 60 TABLET | Refills: 1 | Status: SHIPPED | OUTPATIENT
Start: 2025-08-06

## 2025-08-06 RX ORDER — PIOGLITAZONE 30 MG/1
TABLET ORAL
Qty: 90 TABLET | Refills: 3 | Status: SHIPPED | OUTPATIENT
Start: 2025-08-06

## 2025-08-06 RX ORDER — GLIMEPIRIDE 2 MG/1
TABLET ORAL
Qty: 180 TABLET | Refills: 3 | Status: SHIPPED | OUTPATIENT
Start: 2025-08-06

## 2025-08-06 RX ORDER — ATORVASTATIN CALCIUM 20 MG/1
20 TABLET, FILM COATED ORAL DAILY
Qty: 90 TABLET | Refills: 3 | Status: SHIPPED | OUTPATIENT
Start: 2025-08-06

## 2025-08-06 ASSESSMENT — ENCOUNTER SYMPTOMS: EYES NEGATIVE: 1

## (undated) DEVICE — TOWEL,OR,DSP,ST,BLUE,STD,4/PK,20PK/CS: Brand: MEDLINE

## (undated) DEVICE — C-ARM: Brand: UNBRANDED

## (undated) DEVICE — TROCAR: Brand: KII® SLEEVE

## (undated) DEVICE — LAPAROSCOPIC TROCAR SLEEVE/SINGLE USE: Brand: KII® OPTICAL ACCESS SYSTEM

## (undated) DEVICE — 3M™ IOBAN™ 2 ANTIMICROBIAL INCISE DRAPE 6650EZ: Brand: IOBAN™ 2

## (undated) DEVICE — CATHETER IV 16GA 205ML/MIN L1.77IN OD1.74MM ID1.359MM GRY

## (undated) DEVICE — NEEDLE SPNL 22GAX1 1/2IN

## (undated) DEVICE — CODMAN® SURGICAL PATTIES 1/2" X 1/2" (1.27CM X 1.27CM): Brand: CODMAN®

## (undated) DEVICE — SYRINGE MED 30ML STD CLR PLAS LUERLOCK TIP N CTRL DISP

## (undated) DEVICE — OPEN-END URETERAL CATHETER: Brand: COOK

## (undated) DEVICE — COUNTER NDL 40 COUNT HLD 70 FOAM BLK ADH W/ MAG

## (undated) DEVICE — Z DUPLICATE USE 2431315 SET INSUF TBNG HI FLO W/ SMK EVAC FOR PNEUMOCLEAR

## (undated) DEVICE — LABEL MED MED CHPOR

## (undated) DEVICE — ALCON SURGICAL BLADE 64: Brand: ALCON

## (undated) DEVICE — STERILE COTTON BALLS LARGE 5/P: Brand: MEDLINE

## (undated) DEVICE — GOWN,AURORA,NONRNF,XL,30/CS: Brand: MEDLINE

## (undated) DEVICE — SINGLE PORT MANIFOLD: Brand: NEPTUNE 2

## (undated) DEVICE — INTENDED FOR TISSUE SEPARATION, AND OTHER PROCEDURES THAT REQUIRE A SHARP SURGICAL BLADE TO PUNCTURE OR CUT.: Brand: BARD-PARKER ® CARBON RIB-BACK BLADES

## (undated) DEVICE — 3M™ TEGADERM™ TRANSPARENT FILM DRESSING FRAME STYLE, 1624W, 2-3/8 IN X 2-3/4 IN (6 CM X 7 CM), 100/CT 4CT/CASE: Brand: 3M™ TEGADERM™

## (undated) DEVICE — PACK,BASIC: Brand: MEDLINE

## (undated) DEVICE — LABEL MED MINI W/ MARKER

## (undated) DEVICE — SHEET,DRAPE,53X77,STERILE: Brand: MEDLINE

## (undated) DEVICE — APPLIER CLP M L L11.4IN DIA10MM ENDOSCP ROT MULT FOR LIG

## (undated) DEVICE — NEEDLE HYPO 22GA L1.5IN BLK S STL HUB POLYPR SHLD REG BVL

## (undated) DEVICE — APPLICATOR MEDICATED 26 CC SOLUTION HI LT ORNG CHLORAPREP

## (undated) DEVICE — SKIN MARKER,REGULAR TIP WITH RULER: Brand: DEVON

## (undated) DEVICE — GLOVE SURG 7.5 LTX TRIFLEX WIDE FINGER PWDR

## (undated) DEVICE — SUTURE VCRL + SZ 4-0 L18IN ABSRB UD L19MM PS-2 3/8 CIR PRIM VCP496H

## (undated) DEVICE — ELECTRODE PT RET AD L9FT HI MOIST COND ADH HYDRGEL CORDED

## (undated) DEVICE — CHLORAPREP 26ML ORANGE

## (undated) DEVICE — STOPCOCK 3-WAY 45 PSI LOW OFF ROT COLAR

## (undated) DEVICE — CODMAN® SURGICAL PATTIES 1/2" X 3" (1.27CM X 7.62CM): Brand: CODMAN®

## (undated) DEVICE — SYRINGE BLB 50CC IRRIG PLIABLE FNGR FLNG GRAD FLSK DISP

## (undated) DEVICE — TROCAR: Brand: KII FIOS FIRST ENTRY

## (undated) DEVICE — SUTURE VCRL SZ 0 L27IN ABSRB UD L26MM CP-2 1/2 CIR SGL J870H

## (undated) DEVICE — PACK,LAPAROTOMY,NO GOWNS: Brand: MEDLINE

## (undated) DEVICE — 3M™ STERI-DRAPE™ INSTRUMENT POUCH 1018: Brand: STERI-DRAPE™

## (undated) DEVICE — DRAPE MICSCP W46XL120IN FOR ZEISS MD FEATURING CLEARLENS

## (undated) DEVICE — GLOVE ORANGE PI 7 1/2   MSG9075

## (undated) DEVICE — SUTURE VCRL + SZ 3-0 L18IN ABSRB UD PS-2 3/8 CIR REV CUT VCP497H

## (undated) DEVICE — Device

## (undated) DEVICE — WARMER SCP 2 ANTIFOG LAP DISP

## (undated) DEVICE — DISCONTINUED USE 393278 SYRINGE 10 ML HYPO W/O NDL LL TP PLSTC ST

## (undated) DEVICE — TISSUE RETRIEVAL SYSTEM: Brand: INZII RETRIEVAL SYSTEM

## (undated) DEVICE — BANDAGE ADH W0.75XL3IN UNIV WVN FAB NAT GEN USE STRP N ADH

## (undated) DEVICE — SUTURE MCRYL SZ 4-0 L27IN ABSRB UD L19MM PS-2 1/2 CIR PRIM Y426H

## (undated) DEVICE — GOWN,AURORA,NONREINFORCED,LARGE: Brand: MEDLINE

## (undated) DEVICE — 3M™ STERI-STRIP™ REINFORCED ADHESIVE SKIN CLOSURES, R1547, 1/2 IN X 4 IN (12 MM X 100 MM), 6 STRIPS/ENVELOPE: Brand: 3M™ STERI-STRIP™

## (undated) DEVICE — SUTURE VCRL SZ 2-0 L27IN ABSRB UD L36MM CP-1 1/2 CIR REV J266H

## (undated) DEVICE — WAX SURG 2.5GM HEMSTAT BNE BEESWAX PARAFFIN ISO PALMITATE

## (undated) DEVICE — CORD BPLR 2 PIN FLAT AND RND DISP

## (undated) DEVICE — CATHETER IV 12GA L76MM EXTN DIA2.8MM FEP POLYMER THRM

## (undated) DEVICE — NEPTUNE E-SEP SMOKE EVACUATION PENCIL, COATED, 70MM BLADE, PUSH BUTTON SWITCH: Brand: NEPTUNE E-SEP

## (undated) DEVICE — STERILE LATEX POWDER-FREE SURGICAL GLOVESWITH NITRILE COATING: Brand: PROTEXIS

## (undated) DEVICE — MEDI-VAC NON-CONDUCTIVE SUCTION TUBING: Brand: CARDINAL HEALTH

## (undated) DEVICE — SUTURE SZ 0 27IN 5/8 CIR UR-6  TAPER PT VIOLET ABSRB VICRYL J603H

## (undated) DEVICE — DRAPE,LAP,CHOLE,W/TROUGHS,STERILE: Brand: MEDLINE

## (undated) DEVICE — KIT,ANTI FOG,W/SPONGE & FLUID,SOFT PACK: Brand: MEDLINE

## (undated) DEVICE — X-RAY DETECTABLE SPONGES,16 PLY: Brand: VISTEC

## (undated) DEVICE — DRAPE EQUIP TRNSPRT CONTAINMENT FOR BK TAB

## (undated) DEVICE — PENCIL ES L3M BTTN SWCH HOLSTER W/ BLDE ELECTRD EDGE

## (undated) DEVICE — ELECTRODE LAP L36CM PTFE WIRE J HK CLEANCOAT

## (undated) DEVICE — BANDAGE ADH W2XL4IN NITRL FAB STRP CURAD

## (undated) DEVICE — 3.0MM PRECISION NEURO (MATCH HEAD)

## (undated) DEVICE — SLEEVE CMPR SM STD CALF SCD ANEMB LF

## (undated) DEVICE — 2000CC GUARDIAN II: Brand: GUARDIAN

## (undated) DEVICE — GAUZE,SPONGE,2"X2",8PLY,STERILE,LF,2'S: Brand: MEDLINE

## (undated) DEVICE — 1842 FOAM BLOCK NEEDLE COUNTER: Brand: DEVON

## (undated) DEVICE — GAUZE,SPONGE,4"X4",16PLY,XRAY,STRL,LF: Brand: MEDLINE